# Patient Record
Sex: FEMALE | Race: WHITE | Employment: FULL TIME | URBAN - METROPOLITAN AREA
[De-identification: names, ages, dates, MRNs, and addresses within clinical notes are randomized per-mention and may not be internally consistent; named-entity substitution may affect disease eponyms.]

---

## 2017-05-14 DIAGNOSIS — M72.2 PLANTAR FASCIITIS, RIGHT: ICD-10-CM

## 2017-05-15 NOTE — TELEPHONE ENCOUNTER
Meloxicam      Last Written Prescription Date: 12/26/16  Last Quantity: 30, # refills: 3  Last Office Visit with [facility], [facility] or [facility] prescribing provider: 12/26/16       Creatinine   Date Value Ref Range Status   12/25/2014 0.79 0.52 - 1.04 mg/dL Final     Lab Results   Component Value Date    AST 40 02/12/2014     Lab Results   Component Value Date    ALT 48 02/12/2014     BP Readings from Last 3 Encounters:   12/26/16 121/76   08/15/16 116/68   12/18/15 116/70

## 2017-05-16 RX ORDER — MELOXICAM 15 MG/1
TABLET ORAL
Qty: 30 TABLET | Refills: 1 | Status: SHIPPED | OUTPATIENT
Start: 2017-05-16 | End: 2017-07-13

## 2017-07-06 ENCOUNTER — OFFICE VISIT (OUTPATIENT)
Dept: PODIATRY | Facility: CLINIC | Age: 42
End: 2017-07-06
Payer: COMMERCIAL

## 2017-07-06 VITALS
BODY MASS INDEX: 30.22 KG/M2 | HEART RATE: 77 BPM | HEIGHT: 66 IN | WEIGHT: 188 LBS | DIASTOLIC BLOOD PRESSURE: 87 MMHG | RESPIRATION RATE: 16 BRPM | SYSTOLIC BLOOD PRESSURE: 135 MMHG

## 2017-07-06 DIAGNOSIS — M72.2 PLANTAR FASCIITIS, BILATERAL: ICD-10-CM

## 2017-07-06 DIAGNOSIS — M25.373 ANKLE INSTABILITY, UNSPECIFIED LATERALITY: Primary | ICD-10-CM

## 2017-07-06 PROCEDURE — 99213 OFFICE O/P EST LOW 20 MIN: CPT | Performed by: PODIATRIST

## 2017-07-06 NOTE — MR AVS SNAPSHOT
After Visit Summary   7/6/2017    Alia Jauregui    MRN: [id number]           Patient Information     Date Of Birth          1975        Visit Information        Provider Department      7/6/2017 4:40 PM Yash Batres DPM [facility]        Today's Diagnoses     Ankle instability, unspecified laterality    -  1    Plantar fasciitis, bilateral          Care Instructions    Initial musculoskeletal treatment recommendation:    1.  Stretch the calf muscles as instructed once an hour.  2.  Ice the injured area in the evening; 20 min on/off.  3.  Take antiinflammatory medication as directed.  4.  Massage the soft tissues around the injured area in the morning to loosen the tissue  5.  Wear supportive foot wear    If no improvement in symptoms within four to six weeks, return to clinic for reevaluation.              Follow-ups after your visit        Additional Services     PHYSICAL THERAPY REFERRAL       *This order will print in the [facility] Central Scheduling Office*    [facility] provides Physical Therapy evaluation and treatment and many specialty services across the [facility] system.  If requesting a specialty program, please choose from the list below.    Call one number to schedule at any [facility] location   [phone].    Treatment: Evaluation & Treatment  Special Instructions/Modalities: deep tissue fascial release  Special Programs: None    Please be aware that coverage of these services is subject to the terms and limitations of your health insurance plan.  Call member services at your health plan with any benefit or coverage questions.      **Note to Provider** To refer patients to therapy outside of the location list, change the order class to External Referral in the order composer.                  Follow-up notes from your care team     Return in about 4 weeks (around  "8/3/2017), or if symptoms worsen or fail to improve.      Who to contact     If you have questions or need follow up information about today's clinic visit or your schedule please contact [facility] directly at [phone].  Normal or non-critical lab and imaging results will be communicated to you by [facility], letter or phone within 4 business days after the clinic has received the results. If you do not hear from us within 7 days, please contact the clinic through [facility] or phone. If you have a critical or abnormal lab result, we will notify you by phone as soon as possible.  Submit refill requests through [facility] or call your pharmacy and they will forward the refill request to us. Please allow 3 business days for your refill to be completed.          Additional Information About Your Visit        [facility] Information     [facility] gives you secure access to your electronic health record. If you see a primary care provider, you can also send messages to your care team and make appointments. If you have questions, please call your primary care clinic.  If you do not have a primary care provider, please call [phone] and they will assist you.        Care EveryWhere ID     This is your Care EveryWhere ID. This could be used by other organizations to access your [place] medical records  [id number]        Your Vitals Were     Pulse Respirations Height BMI (Body Mass Index)          77 16 1.664 m (5' 5.5\") 30.81 kg/m2         Blood Pressure from Last 3 Encounters:   07/06/17 135/87   12/26/16 121/76   08/15/16 116/68    Weight from Last 3 Encounters:   07/06/17 85.3 kg (188 lb)   12/26/16 84.7 kg (186 lb 12.8 oz)   08/17/16 84.4 kg (186 lb)              We Performed the Following     PHYSICAL THERAPY REFERRAL          Today's Medication Changes          These changes are accurate as of: 7/6/17  5:08 PM.  If you have any questions, ask your nurse or doctor.               Start " taking these medicines.        Dose/Directions    order for DME   Used for:  Ankle instability, unspecified laterality   Started by:  Yash Batres DPM        Trilok Ankle Brace   Quantity:  2 Device   Refills:  0            Where to get your medicines      Some of these will need a paper prescription and others can be bought over the counter.  Ask your nurse if you have questions.     Bring a paper prescription for each of these medications     order for DME                Primary Care Provider Office Phone # Fax #    Anna Rajan [phone]       [facility] [address] [facility] [address]        Equal Access to Services     [facility]: Hadii aad ku hadasho Soomaali, waaxda luqadaha, qaybta kaalmada adeegyada, petey copeland . So [facility] [phone].    ATENCIÓN: Si habla español, tiene a wilson disposición servicios gratuitos de asistencia lingüística. [facility] [phone].    We comply with applicable federal civil rights laws and [place] laws. We do not discriminate on the basis of race, color, national origin, age, disability sex, sexual orientation or gender identity.            Thank you!     Thank you for choosing [facility]  for your care. Our goal is always to provide you with excellent care. Hearing back from our patients is one way we can continue to improve our services. Please take a few minutes to complete the written survey that you may receive in the mail after your visit with us. Thank you!             Your Updated Medication List - Protect others around you: Learn how to safely use, store and throw away your medicines at www.disposemymeds.org.          This list is accurate as of: 7/6/17  5:08 PM.  Always use your most recent med list.                   Brand Name Dispense Instructions for use Diagnosis    atenolol 50 MG tablet    TENORMIN    90 tablet    Take 1 tablet (50 mg) by mouth daily     Hypertension goal BP (blood pressure) < 140/90       ibuprofen 200 MG tablet    ADVIL/MOTRIN     Take 200 mg by mouth every 4 hours as needed.        loratadine 10 MG tablet    CLARITIN     Take 10 mg by mouth daily        meloxicam 15 MG tablet    MOBIC    30 tablet    TAKE ONE TABLET (15 MG) BY MOUTH ONCE DAILY AS NEEDED    Plantar fasciitis, right       order for DME      Respironics Remstar 60 series auto 5-15 cm H2O, wisp s/m    TONY (obstructive sleep apnea)       order for DME     2 Device    Trilok Ankle Brace    Ankle instability, unspecified laterality       TYLENOL 325 MG tablet   Generic drug:  acetaminophen      Take 1-2 tablets by mouth every 6 hours as needed.

## 2017-07-06 NOTE — PROGRESS NOTES
Alia returns to the office for reevaluation of the right and left foot.  The patient relates following the instructions given at the last visit with noted more pain.  The patient relates overall less  improvement in pain and function of the right and left foot.  The patient relates no other problems.    PAST MEDICAL HISTORY:   Past Medical History:   Diagnosis Date     Gestational diabetes 2008     Incompetence of cervix 7/25/2008 7/22/08-cerclage placed, Dr. Quezada following pt with mfm      Preeclampsia        BMI= Body mass index is 30.81 kg/(m^2).    Weight management plan: Patient was referred to their PCP to discuss a diet and exercise plan.    Physical Exam:    General: The patient appears to have a pleasant mental affect.    Lower extremity physical exam:  Neurovascular status is intact with palpable pedal pulses and intact epicritic sensations.  Muscular exam is within normal limits to major muscle groups.  Integument is intact.      One notes decreased edema.  One notes instability of the ankle joints bilaterally with excessive inversion noted with pain.  Noted pain on palpation of the insertion point of the plantar fascia bilaterally.  No surrounding erythema noted.       Assessment:      ICD-10-CM    1. Ankle instability, unspecified laterality M25.373 order for DME   2. Plantar fasciitis, bilateral M72.2 PHYSICAL THERAPY REFERRAL       Plan:  I have explained to Alia about the conditions.  At this time, the patient was instructed on icing, stretching, tissue massage and support.  The patient was fitted with a Trilok ankle brace that will aid in offloading the tension forces to the soft tissues and prevent further inflammation.  To reduce the amount of current instability and inflammation, the patient was prescribed physical therapy.  The patient will return in four weeks for reevaluation if the symptoms do not resolve.        Disclaimer: This note consists of symbols derived from keyboarding,  dictation and/or voice recognition software. As a result, there may be errors in the script that have gone undetected. Please consider this when interpreting information found in this chart.       SHERI Batres D.P.M., JOHN.[email or web address].

## 2017-07-06 NOTE — LETTER
7/6/2017         RE: Alia Jauregui  [address] [place] [address]        Dear Colleague,    Thank you for referring your patient, Alia Jauregui, to the [facility]. Please see a copy of my visit note below.    Alia returns to the office for reevaluation of the right and left foot.  The patient relates following the instructions given at the last visit with noted more pain.  The patient relates overall less  improvement in pain and function of the right and left foot.  The patient relates no other problems.    PAST MEDICAL HISTORY:   Past Medical History:   Diagnosis Date     Gestational diabetes 2008     Incompetence of cervix 7/25/2008 7/22/08-cerclage placed, Dr. Quezada following pt with mfm      Preeclampsia        BMI= Body mass index is 30.81 kg/(m^2).    Weight management plan: Patient was referred to their PCP to discuss a diet and exercise plan.    Physical Exam:    General: The patient appears to have a pleasant mental affect.    Lower extremity physical exam:  Neurovascular status is intact with palpable pedal pulses and intact epicritic sensations.  Muscular exam is within normal limits to major muscle groups.  Integument is intact.      One notes decreased edema.  One notes instability of the ankle joints bilaterally with excessive inversion noted with pain.  Noted pain on palpation of the insertion point of the plantar fascia bilaterally.  No surrounding erythema noted.       Assessment:      ICD-10-CM    1. Ankle instability, unspecified laterality M25.373 order for DME   2. Plantar fasciitis, bilateral M72.2 PHYSICAL THERAPY REFERRAL       Plan:  I have explained to Alia about the conditions.  At this time, the patient was instructed on icing, stretching, tissue massage and support.  The patient was fitted with a Trilok ankle brace that will aid in offloading the tension forces to the soft tissues and prevent further inflammation.  To reduce the  amount of current instability and inflammation, the patient was prescribed physical therapy.  The patient will return in four weeks for reevaluation if the symptoms do not resolve.        Disclaimer: This note consists of symbols derived from keyboarding, dictation and/or voice recognition software. As a result, there may be errors in the script that have gone undetected. Please consider this when interpreting information found in this chart.       SHERI Batres D.P.M., F.A.C.F.A.S.      Again, thank you for allowing me to participate in the care of your patient.        Sincerely,        Yash Batres DPM

## 2017-07-06 NOTE — NURSING NOTE
"Chief Complaint   Patient presents with     RECHECK     Plantar Fasciitis        Initial /87 (BP Location: Left arm, Patient Position: Chair, Cuff Size: Adult Regular)  Pulse 77  Resp 16  Ht 5' 5.5\" (1.664 m)  Wt 188 lb (85.3 kg)  BMI 30.81 kg/m2 Estimated body mass index is 30.81 kg/(m^2) as calculated from the following:    Height as of this encounter: 5' 5.5\" (1.664 m).    Weight as of this encounter: 188 lb (85.3 kg).  BP completed using cuff size: regular      Jayde Torres CMA     "

## 2017-07-13 DIAGNOSIS — M72.2 PLANTAR FASCIITIS, RIGHT: ICD-10-CM

## 2017-07-13 NOTE — TELEPHONE ENCOUNTER
Meloxicam      Last Written Prescription Date: 05/16/17  Last Quantity: 30, # refills: 1  [facility] Office Visit with [facility] or [facility] prescribing provider: 12/16/16       Creatinine   Date Value Ref Range Status   12/25/2014 0.79 0.52 - 1.04 mg/dL Final     Lab Results   Component Value Date    AST 40 02/12/2014     Lab Results   Component Value Date    ALT 48 02/12/2014     BP Readings from Last 3 Encounters:   07/06/17 135/87   12/26/16 121/76   08/15/16 116/68

## 2017-07-17 ENCOUNTER — HOSPITAL ENCOUNTER (OUTPATIENT)
Dept: PHYSICAL THERAPY | Facility: CLINIC | Age: 42
Setting detail: THERAPIES SERIES
End: 2017-07-17
Attending: PODIATRIST
Payer: COMMERCIAL

## 2017-07-17 PROCEDURE — 97110 THERAPEUTIC EXERCISES: CPT | Mod: GP | Performed by: PHYSICAL THERAPIST

## 2017-07-17 PROCEDURE — 97140 MANUAL THERAPY 1/> REGIONS: CPT | Mod: GP | Performed by: PHYSICAL THERAPIST

## 2017-07-17 PROCEDURE — 97161 PT EVAL LOW COMPLEX 20 MIN: CPT | Mod: GP | Performed by: PHYSICAL THERAPIST

## 2017-07-17 PROCEDURE — 40000718 ZZHC STATISTIC PT DEPARTMENT ORTHO VISIT: Performed by: PHYSICAL THERAPIST

## 2017-07-18 NOTE — TELEPHONE ENCOUNTER
Patient due for labs  Jeimy refill x1 given 5/16/17    Left detailed message for patient to return call to clinic.    Shanika FAGAN Rn

## 2017-07-18 NOTE — PROGRESS NOTES
PHYSICAL THERAPY INITIAL EVALUATION  07/17/17 1600   General Information   Type of Visit Initial OP Ortho PT Evaluation   Start of Care Date 07/17/17   Referring Physician Dr. Batres   Patient/Family Goals Statement get rid of the pain and off the medication   Orders Evaluate and Treat   Orders Comment deep tissue fascial release   Date of Order 07/06/17   Insurance Type [employer]   Insurance Comments/Visits Authorized no limits   Medical Diagnosis Plantar fasciitis, bilateral    Surgical/Medical history reviewed Yes   Precautions/Limitations no known precautions/limitations   General Information Comments PMH: severely sprained L ankle? in highschool   Body Part(s)   Body Part(s) Ankle/Foot   Presentation and Etiology   Pertinent history of current problem (include personal factors and/or comorbidities that impact the POC) Patient reports pain in heels for over a year, saw Dr. Batres about a year ago. Was put on melexocam, primary doctor increased which then helped. As long as she was on them it helped with the pain but as soon as she stops taking it, it hurts. Pain with prolonged walking and first thing in the morning. Patient reports had orthotics made about 6 months ago. Recently got 2 trilock braces. Has been doing calf stretches several times day, every day.      Impairments A. Pain;C. Swelling;E. Decreased flexibility;H. Impaired gait   Functional Limitations perform activities of daily living;perform required work activities;perform desired leisure / sports activities   Symptom Location B heels, anterior ankle with prolonged walking   How/Where did it occur From insidious onset   Onset date of current episode/exacerbation 07/17/16   Chronicity Chronic   Pain rating (0-10 point scale) Best (/10);Worst (/10)   Best (/10) 3   Worst (/10) 9   Pain quality A. Sharp;B. Dull;C. Aching;F. Stabbing   Frequency of pain/symptoms A. Constant   Pain/symptoms exacerbated by B. Walking;E. Rest   Pain/symptoms eased by  J. Braces/supports;I. OTC medication(s)   Current Level of Function   Patient role/employment history A. 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      Fall Risk Screen   Fall screen completed by PT   Per patient - Fall 2 or more times in past year? No   Per patient - Fall with injury in past year? No   Is patient a fall risk? No   Functional Scales   Functional Scales Other   Other Scales  LEFS: 59/80   Ankle/Foot Objective Findings   Side (if bilateral, select both right and left) Right;Left   Gait/Locomotion hyperpronation R foot   Foot Position In Standing pes planus R   Kleiger ER Test (DF/Eversion Test) -   Anterior Drawer Test slightly increased laxity L ankle no pain   Posterior Drawer Test -   Talar Tilt Test -   Palpation tender medial calcaneal tubercle at PF origin   Accessory Motion/Joint Mobility WNL   Right DF (Knee Ext) AROM 5   Right PF AROM WNL   Right Calcanceal Inversion AROM 55   Right Calcaneal Eversion AROM 28   Right Gastroc (in WB) Flexibility mod tight   Right Soleus (in WB) Flexibility mild tight   Left DF (Knee Ext) AROM 5   Left PF AROM WNL   Left Calcanceal Inversion AROM 50   Left Calcaneal Eversion AROM 25   Left Gastroc (in WB) Flexibility mod tight   Left Soleus (in WB) Flexibility mild tight   Planned Therapy Interventions   Planned Therapy Interventions balance training;joint mobilization;manual therapy;neuromuscular re-education;strengthening;stretching;ROM   Clinical Impression   Criteria for Skilled Therapeutic Interventions Met yes, treatment indicated   PT Diagnosis B heel pain   Influenced by the following impairments pain, decreased flexibility, decreased ROM   Functional limitations due to impairments walking, first steps in the morning   Clinical Presentation Stable/Uncomplicated   Clinical Presentation Rationale minimally provactive exam    Clinical Decision Making (Complexity) Low  complexity   Therapy Frequency 1 time/week   Predicted Duration of Therapy Intervention (days/wks) 6-8 weeks   Risk & Benefits of therapy have been explained Yes   Patient, Family & other staff in agreement with plan of care Yes   Clinical Impression Comments Patient presenting with signs and symptoms consistent with B plantar fasciitis.    Education Assessment   Preferred Learning Style Listening;Demonstration;Pictures/video   Barriers to Learning No barriers   ORTHO GOALS   PT Ortho Eval Goals 1;2;3   Ortho Goal 1   Goal Identifier 1   Goal Description Patient will be able to walk a mile with minimal pain or difficulty.    Target Date 09/12/17   Ortho Goal 2   Goal Identifier 2   Goal Description Patient will report pain with first steps in morning 2/10 or less.   Target Date 09/12/17   Ortho Goal 3   Goal Identifier 3   Goal Description Patient will be independent and compliant with HEP to aid functional recovery.    Target Date 09/12/17   Total Evaluation Time   Total Evaluation Time 15       Please contact me with any questions or concerns.  Thank you for your referral.    Annette Altamirano, PT, DPT, OCS  Physical Therapist, Orthopedic Certified Specialist  [facility]  [phone]

## 2017-07-19 RX ORDER — MELOXICAM 15 MG/1
TABLET ORAL
Qty: 15 TABLET | Refills: 0 | Status: SHIPPED | OUTPATIENT
Start: 2017-07-19 | End: 2017-07-19

## 2017-07-19 NOTE — TELEPHONE ENCOUNTER
Left message for the patient to call the clinic.  Spoke to the pharmacy to let them no of the need for labs for further refills. Grisel VERDUGO RN

## 2017-07-26 ENCOUNTER — HOSPITAL ENCOUNTER (OUTPATIENT)
Dept: PHYSICAL THERAPY | Facility: CLINIC | Age: 42
Setting detail: THERAPIES SERIES
End: 2017-07-26
Attending: PODIATRIST
Payer: COMMERCIAL

## 2017-07-26 PROCEDURE — 97140 MANUAL THERAPY 1/> REGIONS: CPT | Mod: GP | Performed by: PHYSICAL THERAPIST

## 2017-07-26 PROCEDURE — 97110 THERAPEUTIC EXERCISES: CPT | Mod: GP | Performed by: PHYSICAL THERAPIST

## 2017-07-26 PROCEDURE — 40000718 ZZHC STATISTIC PT DEPARTMENT ORTHO VISIT: Performed by: PHYSICAL THERAPIST

## 2017-08-02 ENCOUNTER — HOSPITAL ENCOUNTER (OUTPATIENT)
Dept: PHYSICAL THERAPY | Facility: CLINIC | Age: 42
Setting detail: THERAPIES SERIES
End: 2017-08-02
Attending: PODIATRIST
Payer: COMMERCIAL

## 2017-08-02 PROCEDURE — 97140 MANUAL THERAPY 1/> REGIONS: CPT | Mod: GP | Performed by: PHYSICAL THERAPIST

## 2017-08-02 PROCEDURE — 40000718 ZZHC STATISTIC PT DEPARTMENT ORTHO VISIT: Performed by: PHYSICAL THERAPIST

## 2017-08-10 ENCOUNTER — HOSPITAL ENCOUNTER (OUTPATIENT)
Dept: PHYSICAL THERAPY | Facility: CLINIC | Age: 42
Setting detail: THERAPIES SERIES
End: 2017-08-10
Attending: PODIATRIST
Payer: COMMERCIAL

## 2017-08-10 PROCEDURE — 40000718 ZZHC STATISTIC PT DEPARTMENT ORTHO VISIT: Performed by: PHYSICAL THERAPIST

## 2017-08-10 PROCEDURE — 97140 MANUAL THERAPY 1/> REGIONS: CPT | Mod: GP | Performed by: PHYSICAL THERAPIST

## 2017-08-16 ENCOUNTER — HOSPITAL ENCOUNTER (OUTPATIENT)
Dept: PHYSICAL THERAPY | Facility: CLINIC | Age: 42
Setting detail: THERAPIES SERIES
End: 2017-08-16
Attending: PODIATRIST
Payer: COMMERCIAL

## 2017-08-16 PROCEDURE — 40000718 ZZHC STATISTIC PT DEPARTMENT ORTHO VISIT: Performed by: PHYSICAL THERAPIST

## 2017-08-16 PROCEDURE — 97140 MANUAL THERAPY 1/> REGIONS: CPT | Mod: GP | Performed by: PHYSICAL THERAPIST

## 2017-08-16 PROCEDURE — 97110 THERAPEUTIC EXERCISES: CPT | Mod: GP | Performed by: PHYSICAL THERAPIST

## 2017-12-26 NOTE — PROGRESS NOTES
PHYSICAL THERAPY DISCHARGE NOTE  08/16/17 1600   Signing Clinician's Name / Credentials   Signing clinician's name / credentials Annette Altamirano, PT, DPT, OCS   Session Number   Session Number 5 HP   Progress Note/Recertification   Progress Note Due Date 09/12/17   Adult Goals   PT Ortho Eval Goals 1;2;3   Ortho Goal 1   Goal Identifier 1   Goal Description Patient will be able to walk a mile with minimal pain or difficulty.    Target Date 09/12/17   Date Met 08/16/17   Ortho Goal 2   Goal Identifier 2   Goal Description Patient will report pain with first steps in morning 2/10 or less.   Target Date 09/12/17   Date Met 08/16/17   Ortho Goal 3   Goal Identifier 3   Goal Description Patient will be independent and compliant with HEP to aid functional recovery.    Target Date 09/12/17   Subjective Report   Subjective Report Patient reports that the past week the feet have been doing better, less pain  in the heel. Mostly feels pain in anterior ankle while walking. Patient reports that she hasn't taken any medication for past 10 days.     Treatment Interventions   Interventions Therapeutic Procedure/Exercise;Manual Therapy   Therapeutic Procedure/exercise   Minutes 8   Skilled Intervention HEP instruction, flexibility to decrease pain and improve function   Patient Response no pain, demonstrated correct form   Treatment Detail instr pt in performing SL clamshell GTB x 20 B, continue gastroc and soleus stretchin, PF stretching, PF rolling.    Manual Therapy   Minutes 17   Skilled Intervention tooling to decrease tissue tension and pain and improve gait   Patient Response tolerated well, improved flexibility   Treatment Detail tool assisted STM to B plantar fascia, STM to anterior tibiliais B,  STM to B gastroc-soleus complex   Education   Learner Patient   Readiness Eager   Method Booklet/handout;Explanation;Demonstration   Response Verbalizes Understanding;Demonstrates Understanding   Plan   Home program above ex    Updates to plan of care pt would like to try continuing ex on her own and follow up if she needs to   Plan for next session hold chart 30 days   Total Session Time   Timed Code Treatment Minutes 25   Total Treatment Time (sum of timed and untimed services) 25, te mt     UPDATE 12/26/17: Patient did not need to return to physical therapy. Discharge from skilled PT services.        Please contact me with any questions or concerns.  Thank you for your referral.    Annette Altamirano, PT, DPT, OCS  Physical Therapist, Orthopedic Certified Specialist  [facility]  [phone]

## 2017-12-26 NOTE — ADDENDUM NOTE
Encounter addended by: Annette Altamirano, PT on: 12/26/2017  4:11 PM<BR>     Actions taken: Sign clinical note, Flowsheet accepted, Episode resolved

## 2018-01-04 ENCOUNTER — OFFICE VISIT (OUTPATIENT)
Dept: FAMILY MEDICINE | Facility: CLINIC | Age: 43
End: 2018-01-04
Payer: COMMERCIAL

## 2018-01-04 VITALS
HEART RATE: 92 BPM | RESPIRATION RATE: 18 BRPM | BODY MASS INDEX: 28.48 KG/M2 | SYSTOLIC BLOOD PRESSURE: 133 MMHG | TEMPERATURE: 98.8 F | DIASTOLIC BLOOD PRESSURE: 88 MMHG | WEIGHT: 177.2 LBS | OXYGEN SATURATION: 96 % | HEIGHT: 66 IN

## 2018-01-04 DIAGNOSIS — J02.0 ACUTE STREPTOCOCCAL PHARYNGITIS: Primary | ICD-10-CM

## 2018-01-04 DIAGNOSIS — R07.0 THROAT PAIN: ICD-10-CM

## 2018-01-04 LAB
DEPRECATED S PYO AG THROAT QL EIA: ABNORMAL
SPECIMEN SOURCE: ABNORMAL

## 2018-01-04 PROCEDURE — 87880 STREP A ASSAY W/OPTIC: CPT | Performed by: FAMILY MEDICINE

## 2018-01-04 PROCEDURE — 99213 OFFICE O/P EST LOW 20 MIN: CPT | Performed by: FAMILY MEDICINE

## 2018-01-04 RX ORDER — PENICILLIN V POTASSIUM 500 MG/1
500 TABLET, FILM COATED ORAL 2 TIMES DAILY
Qty: 20 TABLET | Refills: 0 | Status: SHIPPED | OUTPATIENT
Start: 2018-01-04 | End: 2018-01-14

## 2018-01-04 NOTE — MR AVS SNAPSHOT
After Visit Summary   1/4/2018    Alia Jauregui    MRN: [id number]           Patient Information     Date Of Birth          1975        Visit Information        Provider Department      1/4/2018 8:00 AM Salvador Huertas MD [facility]        Today's Diagnoses     Acute streptococcal pharyngitis    -  1    Throat pain          Care Instructions          Thank you for choosing [facility].  You may be receiving a survey in the mail from [facility] regarding your visit today.  Please take a few minutes to complete and return the survey to let us know how we are doing.      If you have questions or concerns, please contact us via MeeDoc or you can contact your care team at [phone].    Our Clinic hours are:  Monday 6:40 am  to 7:00 pm  Tuesday -Friday 6:40 am to 5:00 pm    The [place] outpatient lab hours are:  Monday - Friday 6:10 am to 4:45 pm  Saturdays 7:00 am to 11:00 am  Appointments are required, call [phone]    If you have clinical questions after hours or would like to schedule an appointment,  call the clinic at [phone].  Pharyngitis: Strep (Confirmed)    You have had a positive test for strep throat. Strep throat is a contagious illness. It is spread by coughing, kissing or by touching others after touching your mouth or nose. Symptoms include throat pain that is worse with swallowing, aching all over, headache, and fever. It is treated with antibiotic medicine. This should help you start to feel better in 1 to 2 days.  Home care    Rest at home. Drink plenty of fluids to you won't get dehydrated.    No work or school for the first 2 days of taking the antibiotics. After this time, you will not be contagious. You can then return to school or work if you are feeling better.     Take antibiotic medicine for the full 10 days, even if you feel better. This is very important to ensure the infection is treated. It is also important to prevent  medicine-resistant germs from developing. If you were given an antibiotic shot, you don't need any more antibiotics.    You may use acetaminophen or ibuprofen to control pain or fever, unless another medicine was prescribed for this. Talk with your doctor before taking these medicines if you have chronic liver or kidney disease. Also talk with your doctor if you have had a stomach ulcer or GI bleeding.    Throat lozenges or sprays help reduce pain. Gargling with warm saltwater will also reduce throat pain. Dissolve 1/2 teaspoon of salt in 1 glass of warm water. This may be useful just before meals.     Soft foods are OK. Avoid salty or spicy foods.  Follow-up care  Follow up with your healthcare provider or our staff if you don't get better over the next week.  When to seek medical advice  Call your healthcare provider right away if any of these occur:    Fever of 100.4 F (38 C) or higher, or as directed by your healthcare provider    New or worsening ear pain, sinus pain, or headache    Painful lumps in the back of neck    Stiff neck    Lymph nodes getting larger or becoming soft in the middle    You can't swallow liquids or you can't open your mouth wide because of throat pain    Signs of dehydration. These include very dark urine or no urine, sunken eyes, and dizziness.    Trouble breathing or noisy breathing    Muffled voice    Rash  Date Last Reviewed: 4/13/2015 2000-2017 [employer]. [address] [place] [address]. All rights reserved. This information is not intended as a substitute for professional medical care. Always follow your healthcare professional's instructions.                Follow-ups after your visit        Your next 10 appointments already scheduled     Jan 05, 2018  7:40 AM CST   MyChart Short with Tonya Lane MD   [facility]    [address] [address]   [phone]              Who to  "contact     If you have questions or need follow up information about today's clinic visit or your schedule please contact [facility] directly at [phone].  Normal or non-critical lab and imaging results will be communicated to you by MyChart, letter or phone within 4 business days after the clinic has received the results. If you do not hear from us within 7 days, please contact the clinic through [employer] or phone. If you have a critical or abnormal lab result, we will notify you by phone as soon as possible.  Submit refill requests through [employer] or call your pharmacy and they will forward the refill request to us. Please allow 3 business days for your refill to be completed.          Additional Information About Your Visit        [employer] Information     [employer] gives you secure access to your electronic health record. If you see a primary care provider, you can also send messages to your care team and make appointments. If you have questions, please call your primary care clinic.  If you do not have a primary care provider, please call [phone] and they will assist you.        Care EveryWhere ID     This is your Care EveryWhere ID. This could be used by other organizations to access your [employer] [facility] [employer] medical records  [id number]        Your Vitals Were     Pulse Temperature Respirations Height Last Period Pulse Oximetry    92 98.8  F (37.1  C) (Tympanic) 18 5' 5.5\" (1.664 m) 12/10/2014 96%    BMI (Body Mass Index)                   29.04 kg/m2            Blood Pressure from Last 3 Encounters:   01/04/18 133/88   07/06/17 135/87   12/26/16 121/76    Weight from Last 3 Encounters:   01/04/18 177 lb 3.2 oz (80.4 kg)   07/06/17 188 lb (85.3 kg)   12/26/16 186 lb 12.8 oz (84.7 kg)              We Performed the Following     Strep, Rapid Screen          Today's Medication Changes          These changes are accurate as of: 1/4/18  8:25 AM.  If you have any questions, ask your nurse or doctor.       "         Start taking these medicines.        Dose/Directions    penicillin V potassium 500 MG tablet   Commonly known as:  VEETID   Used for:  Acute streptococcal pharyngitis   Started by:  Salvador Huertas MD        Dose:  500 mg   Take 1 tablet (500 mg) by mouth 2 times daily for 10 days   Quantity:  20 tablet   Refills:  0            Where to get your medicines      These medications were sent to [facility] [place] - [place], [place] - [address]  [address] [address]     Phone:  [phone]     penicillin V potassium 500 MG tablet                Primary Care Provider Office Phone # Fax #    Anna Rajan [phone] [address] [place] [address]        Equal Access to Services     FELIPE WU : Hadii ruchi bunch hadasho Soomaali, waaxda luqadaha, qaybta kaalmada adeegyada, petey copeland . So [facility] [phone].    ATENCIÓN: Si habla español, tiene a wilson disposición servicios gratuitos de asistencia lingüística. [facility] [phone].    We comply with applicable federal civil rights laws and [place] laws. We do not discriminate on the basis of race, color, national origin, age, disability, sex, sexual orientation, or gender identity.            Thank you!     Thank you for choosing [facility]  for your care. Our goal is always to provide you with excellent care. Hearing back from our patients is one way we can continue to improve our services. Please take a few minutes to complete the written survey that you may receive in the mail after your visit with us. Thank you!             Your Updated Medication List - Protect others around you: Learn how to safely use, store and throw away your medicines at www.disposemymeds.org.          This list is accurate as of: 1/4/18  8:25 AM.  Always use your most recent med list.                   Brand Name Dispense Instructions for use Diagnosis    atenolol 50 MG tablet     TENORMIN    90 tablet    Take 1 tablet (50 mg) by mouth daily    Hypertension goal BP (blood pressure) < 140/90       ibuprofen 200 MG tablet    ADVIL/MOTRIN     Take 200 mg by mouth every 4 hours as needed.        loratadine 10 MG tablet    CLARITIN     Take 10 mg by mouth as needed        order for DME      Respironics Remstar 60 series auto 5-15 cm H2O, wisp s/m    TONY (obstructive sleep apnea)       order for DME     2 Device    Trilok Ankle Brace    Ankle instability, unspecified laterality       penicillin V potassium 500 MG tablet    VEETID    20 tablet    Take 1 tablet (500 mg) by mouth 2 times daily for 10 days    Acute streptococcal pharyngitis       TYLENOL 325 MG tablet   Generic drug:  acetaminophen      Take 1-2 tablets by mouth every 6 hours as needed.

## 2018-01-04 NOTE — PROGRESS NOTES
"  SUBJECTIVE:   Alia Jauregui is a 42 year old female who presents to clinic today for the following health issues:      ENT Symptoms             Symptoms: cc Present Absent Comment   Fever/Chills  x     Fatigue  x     Muscle Aches  x     Eye Irritation   x    Sneezing   x    Nasal Teja/Drg  x     Sinus Pressure/Pain   x    Loss of smell   x    Dental pain   x    Sore Throat  x     Swollen Glands  x     Ear Pain/Fullness  x  pressure   Cough   x    Wheeze   x    Chest Pain   x    Shortness of breath   x    Rash   x    Other  x  headache     Symptom duration: 3 days   Symptom severity:  moderate/severe   Treatments tried:  tylenol, ibuprofen, cough drops, mucinex   Contacts:  work       Monday night started with sore throat and fever and chills and aches.    Problem list and histories reviewed & adjusted, as indicated.  Additional history: as documented    Reviewed and updated as needed this visit by clinical staffTobacco  Allergies  Meds  Med Hx  Surg Hx  Fam Hx  Soc Hx      Reviewed and updated as needed this visit by Provider         ROS:  Constitutional, HEENT, cardiovascular, pulmonary, gi and gu systems are negative, except as otherwise noted.      OBJECTIVE:   /88  Pulse 92  Temp 98.8  F (37.1  C) (Tympanic)  Resp 18  Ht 5' 5.5\" (1.664 m)  Wt 177 lb 3.2 oz (80.4 kg)  LMP 12/10/2014  SpO2 96%  BMI 29.04 kg/m2  Body mass index is 29.04 kg/(m^2).  GENERAL: healthy, alert and no distress  HENT: ear canals and TM's normal, nose and mouth without ulcers or lesions, posterior pharynx moderate erythema tonsils 2+.  NECK: no adenopathy, no asymmetry, masses, or scars and thyroid normal to palpation  RESP: lungs clear to auscultation - no rales, rhonchi or wheezes  CV: regular rate and rhythm, normal S1 S2, no S3 or S4, no murmur, click or rub, no peripheral edema and peripheral pulses strong  SKIN: no suspicious lesions or rashes    Diagnostic Test Results:  Results for orders placed or " performed in visit on 01/04/18 (from the past 24 hour(s))   Strep, Rapid Screen   Result Value Ref Range    Specimen Description Throat     Rapid Strep A Screen (A)      POSITIVE: Group A Streptococcal antigen detected by immunoassay.       ASSESSMENT/PLAN:       Alia was seen today for pharyngitis.    Diagnoses and all orders for this visit:    Acute streptococcal pharyngitis  -     penicillin V potassium (VEETID) 500 MG tablet; Take 1 tablet (500 mg) by mouth 2 times daily for 10 days    Throat pain  -     Strep, Rapid Screen        See Patient Instructions    Salvador Huertas MD  [facility]

## 2018-01-04 NOTE — PATIENT INSTRUCTIONS
Thank you for choosing [facility].  You may be receiving a survey in the mail from [employer] regarding your visit today.  Please take a few minutes to complete and return the survey to let us know how we are doing.      If you have questions or concerns, please contact us via [employer] or you can contact your care team at [phone].    Our Clinic hours are:  Monday 6:40 am  to 7:00 pm  Tuesday -Friday 6:40 am to 5:00 pm    The [place] outpatient lab hours are:  Monday - Friday 6:10 am to 4:45 pm  Saturdays 7:00 am to 11:00 am  Appointments are required, call [phone]    If you have clinical questions after hours or would like to schedule an appointment,  call the clinic at [phone].  Pharyngitis: Strep (Confirmed)    You have had a positive test for strep throat. Strep throat is a contagious illness. It is spread by coughing, kissing or by touching others after touching your mouth or nose. Symptoms include throat pain that is worse with swallowing, aching all over, headache, and fever. It is treated with antibiotic medicine. This should help you start to feel better in 1 to 2 days.  Home care    Rest at home. Drink plenty of fluids to you won't get dehydrated.    No work or school for the first 2 days of taking the antibiotics. After this time, you will not be contagious. You can then return to school or work if you are feeling better.     Take antibiotic medicine for the full 10 days, even if you feel better. This is very important to ensure the infection is treated. It is also important to prevent medicine-resistant germs from developing. If you were given an antibiotic shot, you don't need any more antibiotics.    You may use acetaminophen or ibuprofen to control pain or fever, unless another medicine was prescribed for this. Talk with your doctor before taking these medicines if you have chronic liver or kidney disease. Also talk with your doctor if you have had a stomach ulcer or GI  bleeding.    Throat lozenges or sprays help reduce pain. Gargling with warm saltwater will also reduce throat pain. Dissolve 1/2 teaspoon of salt in 1 glass of warm water. This may be useful just before meals.     Soft foods are OK. Avoid salty or spicy foods.  Follow-up care  Follow up with your healthcare provider or our staff if you don't get better over the next week.  When to seek medical advice  Call your healthcare provider right away if any of these occur:    Fever of 100.4 F (38 C) or higher, or as directed by your healthcare provider    New or worsening ear pain, sinus pain, or headache    Painful lumps in the back of neck    Stiff neck    Lymph nodes getting larger or becoming soft in the middle    You can't swallow liquids or you can't open your mouth wide because of throat pain    Signs of dehydration. These include very dark urine or no urine, sunken eyes, and dizziness.    Trouble breathing or noisy breathing    Muffled voice    Rash  Date Last Reviewed: 4/13/2015 2000-2017 [employer]. [address] [place] [address], [place] [address]. All rights reserved. This information is not intended as a substitute for professional medical care. Always follow your healthcare professional's instructions.

## 2018-01-04 NOTE — LETTER
[facility]  [address] [address]  Phone: [phone]    January 4, 2018        Alia Jauregui  [address] [facility] [address]          To whom it may concern:    RE: Alia Jauregui    Patient was seen and treated today at our clinic and missed work 1/2-1/5/18.  She may return to work when fever free for 24 hours.    Please contact me for questions or concerns.      Sincerely,        Salvador Huertas MD

## 2018-01-04 NOTE — NURSING NOTE
"Chief Complaint   Patient presents with     Pharyngitis     x 2 days       Initial /88  Pulse 92  Temp 98.8  F (37.1  C) (Tympanic)  Resp 18  Ht 5' 5.5\" (1.664 m)  Wt 177 lb 3.2 oz (80.4 kg)  LMP 12/10/2014  SpO2 96%  BMI 29.04 kg/m2 Estimated body mass index is 29.04 kg/(m^2) as calculated from the following:    Height as of this encounter: 5' 5.5\" (1.664 m).    Weight as of this encounter: 177 lb 3.2 oz (80.4 kg).  Medication Reconciliation: complete  "

## 2018-04-27 DIAGNOSIS — I10 HYPERTENSION GOAL BP (BLOOD PRESSURE) < 140/90: ICD-10-CM

## 2018-04-30 NOTE — TELEPHONE ENCOUNTER
"Requested Prescriptions   Pending Prescriptions Disp Refills     atenolol (TENORMIN) 50 MG tablet [Pharmacy Med Name: ATENOLOL 50MG       TAB]  Last Written Prescription Date:  12/26/2016  Last Fill Quantity: 90,  # refills: 3   Last office visit: 1/4/2018 with prescribing provider:  matt   Future Office Visit:     90 tablet 3     Sig: TAKE ONE TABLET (50 MG) BY MOUTH ONCE DAILY    Beta-Blockers Protocol Passed    4/27/2018  9:22 PM       Passed - Blood pressure under 140/90 in past 12 months    BP Readings from Last 3 Encounters:   01/04/18 133/88   07/06/17 135/87   12/26/16 121/76                Passed - Patient is age 6 or older       Passed - Recent (12 mo) or future (30 days) visit within the authorizing provider's specialty    Patient had office visit in the last 12 months or has a visit in the next 30 days with authorizing provider or within the authorizing provider's specialty.  See \"Patient Info\" tab in inbasket, or \"Choose Columns\" in Meds & Orders section of the refill encounter.            Michael Singh RT (R)    "

## 2018-05-01 RX ORDER — ATENOLOL 50 MG/1
TABLET ORAL
Qty: 90 TABLET | Refills: 2 | Status: SHIPPED | OUTPATIENT
Start: 2018-05-01 | End: 2019-06-05

## 2018-06-20 ENCOUNTER — OFFICE VISIT (OUTPATIENT)
Dept: FAMILY MEDICINE | Facility: CLINIC | Age: 43
End: 2018-06-20
Payer: COMMERCIAL

## 2018-06-20 VITALS
DIASTOLIC BLOOD PRESSURE: 98 MMHG | WEIGHT: 183.8 LBS | TEMPERATURE: 99.3 F | OXYGEN SATURATION: 95 % | BODY MASS INDEX: 29.54 KG/M2 | RESPIRATION RATE: 14 BRPM | HEART RATE: 90 BPM | SYSTOLIC BLOOD PRESSURE: 146 MMHG | HEIGHT: 66 IN

## 2018-06-20 DIAGNOSIS — R10.31 ABDOMINAL PAIN, RIGHT LOWER QUADRANT: Primary | ICD-10-CM

## 2018-06-20 LAB
ALBUMIN SERPL-MCNC: 4 G/DL (ref 3.4–5)
ALP SERPL-CCNC: 63 U/L (ref 40–150)
ALT SERPL W P-5'-P-CCNC: 48 U/L (ref 0–50)
ANION GAP SERPL CALCULATED.3IONS-SCNC: 7 MMOL/L (ref 3–14)
AST SERPL W P-5'-P-CCNC: 32 U/L (ref 0–45)
BASOPHILS # BLD AUTO: 0.1 10E9/L (ref 0–0.2)
BASOPHILS NFR BLD AUTO: 0.9 %
BILIRUB SERPL-MCNC: 1.8 MG/DL (ref 0.2–1.3)
BUN SERPL-MCNC: 10 MG/DL (ref 7–30)
CALCIUM SERPL-MCNC: 9 MG/DL (ref 8.5–10.1)
CHLORIDE SERPL-SCNC: 102 MMOL/L (ref 94–109)
CO2 SERPL-SCNC: 29 MMOL/L (ref 20–32)
CREAT SERPL-MCNC: 0.79 MG/DL (ref 0.52–1.04)
DIFFERENTIAL METHOD BLD: NORMAL
EOSINOPHIL # BLD AUTO: 0.3 10E9/L (ref 0–0.7)
EOSINOPHIL NFR BLD AUTO: 2.8 %
ERYTHROCYTE [DISTWIDTH] IN BLOOD BY AUTOMATED COUNT: 11.9 % (ref 10–15)
GFR SERPL CREATININE-BSD FRML MDRD: 80 ML/MIN/1.7M2
GLUCOSE SERPL-MCNC: 94 MG/DL (ref 70–99)
HCT VFR BLD AUTO: 43 % (ref 35–47)
HGB BLD-MCNC: 15.1 G/DL (ref 11.7–15.7)
LIPASE SERPL-CCNC: 177 U/L (ref 73–393)
LYMPHOCYTES # BLD AUTO: 3.2 10E9/L (ref 0.8–5.3)
LYMPHOCYTES NFR BLD AUTO: 36 %
MCH RBC QN AUTO: 31.9 PG (ref 26.5–33)
MCHC RBC AUTO-ENTMCNC: 35.1 G/DL (ref 31.5–36.5)
MCV RBC AUTO: 91 FL (ref 78–100)
MONOCYTES # BLD AUTO: 0.5 10E9/L (ref 0–1.3)
MONOCYTES NFR BLD AUTO: 5.7 %
NEUTROPHILS # BLD AUTO: 4.9 10E9/L (ref 1.6–8.3)
NEUTROPHILS NFR BLD AUTO: 54.6 %
PLATELET # BLD AUTO: 220 10E9/L (ref 150–450)
POTASSIUM SERPL-SCNC: 3.7 MMOL/L (ref 3.4–5.3)
PROT SERPL-MCNC: 8.3 G/DL (ref 6.8–8.8)
RBC # BLD AUTO: 4.73 10E12/L (ref 3.8–5.2)
SODIUM SERPL-SCNC: 138 MMOL/L (ref 133–144)
WBC # BLD AUTO: 8.9 10E9/L (ref 4–11)

## 2018-06-20 PROCEDURE — 83690 ASSAY OF LIPASE: CPT | Performed by: INTERNAL MEDICINE

## 2018-06-20 PROCEDURE — 80053 COMPREHEN METABOLIC PANEL: CPT | Performed by: INTERNAL MEDICINE

## 2018-06-20 PROCEDURE — 85025 COMPLETE CBC W/AUTO DIFF WBC: CPT | Performed by: INTERNAL MEDICINE

## 2018-06-20 PROCEDURE — 99214 OFFICE O/P EST MOD 30 MIN: CPT | Performed by: INTERNAL MEDICINE

## 2018-06-20 PROCEDURE — 36415 COLL VENOUS BLD VENIPUNCTURE: CPT | Performed by: INTERNAL MEDICINE

## 2018-06-20 NOTE — PROGRESS NOTES
SUBJECTIVE:   Alia Jauregui is a 42 year old female who presents to clinic today for the following health issues:  Chief Complaint   Patient presents with     Constipation     x 4 months on/off, w/ bloating, right side abdominal pain and back pain          ABDOMINAL PAIN     Onset: x 4 months     Description:   Character: Dull ache and Cramping  Location: right side, sometimes generalized  Radiation: Back    Intensity: moderate    Progression of Symptoms:  worsening and waxing and waning    Accompanying Signs & Symptoms:  Fever/Chills?: no   Gas/Bloating: YES- a lot of bloating  Nausea: YES  Vomitting: YES- one episode, and gets the feeling she is going to vomit  Diarrhea?: no   Constipation:YES- will have sporadic episodes of constipation for 4 or 5 days at a time.   Will double up on probiotic at that time and constipation will resolve for a bit.   No blood in stool, no mucus in stool  Dysuria or Hematuria: no   Having some headaches   History:   Trauma: no   Previous similar pain: no    Previous tests done: none    Precipitating factors:   Does the pain change with:     Food: no  - unsure    BM: YES- after she clears enough stool out then the pain improves    Urination: no   Symptoms are somewhat worse with stress    Alleviating factors:    OTC probiotic    Therapies Tried and outcome: stool softener maybe combined with laxative but didn't really work.  Tried daily for 2 weeks.  Miralax, Milk of Magnesium- causes some churning in the stomach    LMP:  not applicable       Alia does report having a prior history of stool urgency after eating.  Has not noticed any particular food triggers.  History of lactose intolerance noted in chart.      She was not aware that atenolol refill was at pharmacy so she has been off this for a couple months.       Problem list and histories reviewed & adjusted, as indicated.  Additional history: as documented    Patient Active Problem List   Diagnosis     Polycystic ovaries      Hypertension goal BP (blood pressure) < 140/90     CARDIOVASCULAR SCREENING; LDL GOAL LESS THAN 160     Lactose intolerance     TONY (obstructive sleep apnea)     S/P hysterectomy     Past Surgical History:   Procedure Laterality Date      SECTION  2005    @ 24 weeks      SECTION      @ 29 weeks     D & C  3/4/08     LAPAROSCOPIC HYSTERECTOMY TOTAL, BILATERAL SALPINGO-OOPHORECTOMY, COMBINED N/A 2014    tlh, bilateral salpingectomy       Social History   Substance Use Topics     Smoking status: Never Smoker     Smokeless tobacco: Never Used     Alcohol use Yes      Comment: occassional      Family History   Problem Relation Age of Onset     Diabetes Maternal Grandmother      HEART DISEASE Maternal Grandmother      Open heart surgery, pacemaker     Circulatory Maternal Grandmother      Blood clot     Hypertension Maternal Grandmother      TONY     Thyroid Disease Maternal Grandmother      hypothyroidism     HEART DISEASE Paternal Grandfather      Hypertension Paternal Grandfather      Alcohol/Drug Paternal Grandfather      recovering     Hypertension Mother      Gynecology Mother      fibroids     Diabetes Mother      boarderline     Hypertension Father      Alcohol/Drug Maternal Grandfather      ETOH abuse     Eye Disorder Son      near sided     Thyroid Disease Son      hypothyroidism/hypothyroidism     Autism Spectrum Disorder Son      Dx 10/2015     Depression Son 11     Anxiety Disorder Son      Gynecology Sister      Fibroids     Eye Disorder Daughter      far sided     Thyroid Disease Daughter      hypothyroidism     Endocrine Disease Daughter      Growth delay, starting shots 2016     Ear Disorder Daughter      hearing aid in left ear     Cancer - colorectal Paternal Uncle      in his 40's, also lung     Other - See Comments Daughter 2     snored         Current Outpatient Prescriptions   Medication Sig Dispense Refill     atenolol (TENORMIN) 50 MG tablet TAKE ONE TABLET (50  "MG) BY MOUTH ONCE DAILY 90 tablet 2     loratadine (CLARITIN) 10 MG tablet Take 10 mg by mouth as needed        Probiotic Product (PROBIOTIC DAILY PO)        acetaminophen (TYLENOL) 325 MG tablet Take 1-2 tablets by mouth every 6 hours as needed.       ibuprofen (ADVIL,MOTRIN) 200 MG tablet Take 200 mg by mouth every 4 hours as needed.       order for DME Trilok Ankle Brace 2 Device 0     ORDER FOR DME Respironics Remstar 60 series auto 5-15 cm H2O, wisp s/m       Allergies   Allergen Reactions     Nkda [No Known Drug Allergies]        Reviewed and updated as needed this visit by clinical staff  Tobacco  Allergies  Med Hx  Surg Hx  Fam Hx  Soc Hx      Reviewed and updated as needed this visit by Provider         ROS:  Constitutional, gi and gu systems are negative, except as otherwise noted.    OBJECTIVE:     BP (!) 146/98 (BP Location: Right arm, Patient Position: Chair, Cuff Size: Adult Regular)  Pulse 90  Temp 99.3  F (37.4  C) (Tympanic)  Resp 14  Ht 5' 5.5\" (1.664 m)  Wt 183 lb 12.8 oz (83.4 kg)  LMP 12/10/2014  SpO2 95%  BMI 30.12 kg/m2  Body mass index is 30.12 kg/(m^2).    GENERAL: healthy, alert and no distress  RESP: lungs clear to auscultation - no rales, rhonchi or wheezes  CV: regular rate and rhythm, normal S1 S2, no S3 or S4, no murmur, click or rub  ABDOMEN: soft, very mild LLQ tenderness on initial palpation but no pain on repeat palpation, no rebound or guarding, no hepatosplenomegaly, no masses and bowel sounds normal      Diagnostic Test Results:  No results found for this or any previous visit (from the past 24 hour(s)).    ASSESSMENT/PLAN:       1. Abdominal pain, right lower quadrant    Abdominal pain seems most likely due to constipation, possibly IBS-C.  Symptoms are somewhat worse when stressed.  Advised keeping a food diary.  Recommended labs and CT to rule out other etiologies.  Daily stool softener was not effective- recommended trying BID.  Increase fiber.  Miralax and MOM " caused too much cramping.    If symptoms not improving could consider trial of Linzess.      - CBC with platelets differential  - Comprehensive metabolic panel  - Lipase  - CT Abdomen Pelvis w Contrast; Future    BP elevated, has been out of atenolol.  Resume and return in 2 weeks for BP recheck with RN.     Tariq Paige MD  [facility]

## 2018-06-20 NOTE — MR AVS SNAPSHOT
After Visit Summary   6/20/2018    Alia Jauregui    MRN: [id number]           Patient Information     Date Of Birth          1975        Visit Information        Provider Department      6/20/2018 4:00 PM Tariq Paige MD [facility]        Today's Diagnoses     Abdominal pain, right lower quadrant    -  1      Care Instructions    Abdominal CT scan was ordered.  Please call [phone] to schedule.     If everything looks okay, this may be due to irritable bowel syndrome.      Keep a food diary for a least two weeks to see if you can identify any food triggers of your symptoms.  Write down all of the foods that you eat, and then also right down when you experience symptoms and what those symptoms are.  Try to identify any possible triggers.  If you identify a possible trigger, next eliminate that food from your diet for two weeks and see if symptoms improve.  If they do, you can add that food back into your diet to see if symptoms resume or you can just continue to avoid that food.       For the constipation, increase your fiber.  Try taking the stool softener twice per day for awhile to see if that works better than once per day.    You can also try an enema or suppository.  If that still doesn't work, try the following recipe.      Omar Bullet recipe for constipation:    30ml Milk of Magnesia  30ml of mineral oil  8 ounces of prune juice     If symptoms are not improving, we can consider a prescription medication.  Let me know.           Follow-ups after your visit        Future tests that were ordered for you today     Open Future Orders        Priority Expected Expires Ordered    CT Abdomen Pelvis w Contrast Routine  6/20/2019 6/20/2018            Who to contact     If you have questions or need follow up information about today's clinic visit or your schedule please contact [facility] directly at [phone].  Normal or non-critical lab and imaging results  "will be communicated to you by MyChart, letter or phone within 4 business days after the clinic has received the results. If you do not hear from us within 7 days, please contact the clinic through [email or web address] or phone. If you have a critical or abnormal lab result, we will notify you by phone as soon as possible.  Submit refill requests through [email or web address] or call your pharmacy and they will forward the refill request to us. Please allow 3 business days for your refill to be completed.          Additional Information About Your Visit        [email or web address] Information     [email or web address] gives you secure access to your electronic health record. If you see a primary care provider, you can also send messages to your care team and make appointments. If you have questions, please call your primary care clinic.  If you do not have a primary care provider, please call [phone] and they will assist you.        Care EveryWhere ID     This is your Care EveryWhere ID. This could be used by other organizations to access your [place] medical records  [id number]        Your Vitals Were     Pulse Temperature Respirations Height Last Period Pulse Oximetry    90 99.3  F (37.4  C) (Tympanic) 14 5' 5.5\" (1.664 m) 12/10/2014 95%    BMI (Body Mass Index)                   30.12 kg/m2            Blood Pressure from Last 3 Encounters:   06/20/18 (!) 146/98   01/04/18 133/88   07/06/17 135/87    Weight from Last 3 Encounters:   06/20/18 183 lb 12.8 oz (83.4 kg)   01/04/18 177 lb 3.2 oz (80.4 kg)   07/06/17 188 lb (85.3 kg)              We Performed the Following     CBC with platelets differential     Comprehensive metabolic panel     Lipase        Primary Care Provider Office Phone # Fax #    Anna Rajan [phone] [address] [place] [address]        Equal Access to Services     KASSIDY WU : Ramona Parker, walenyda luqadaha, qaybta kaalmasantiago lamas, petey bae. So wa " [phone].    ATENCIÓN: Si stephanie goldman, tiene a wilson disposición servicios gratuitos de asistencia lingüística. Anthony sunshine [phone].    We comply with applicable federal civil rights laws and [place] laws. We do not discriminate on the basis of race, color, national origin, age, disability, sex, sexual orientation, or gender identity.            Thank you!     Thank you for choosing [facility]  for your care. Our goal is always to provide you with excellent care. Hearing back from our patients is one way we can continue to improve our services. Please take a few minutes to complete the written survey that you may receive in the mail after your visit with us. Thank you!             Your Updated Medication List - Protect others around you: Learn how to safely use, store and throw away your medicines at www.disposemymeds.org.          This list is accurate as of 6/20/18  4:32 PM.  Always use your most recent med list.                   Brand Name Dispense Instructions for use Diagnosis    atenolol 50 MG tablet    TENORMIN    90 tablet    TAKE ONE TABLET (50 MG) BY MOUTH ONCE DAILY    Hypertension goal BP (blood pressure) < 140/90       ibuprofen 200 MG tablet    ADVIL/MOTRIN     Take 200 mg by mouth every 4 hours as needed.        loratadine 10 MG tablet    CLARITIN     Take 10 mg by mouth as needed        order for DME      Respironics Remstar 60 series auto 5-15 cm H2O, wisp s/m    TONY (obstructive sleep apnea)       order for DME     2 Device    Trilok Ankle Brace    Ankle instability, unspecified laterality       PROBIOTIC DAILY PO           TYLENOL 325 MG tablet   Generic drug:  acetaminophen      Take 1-2 tablets by mouth every 6 hours as needed.

## 2018-06-20 NOTE — PATIENT INSTRUCTIONS
Abdominal CT scan was ordered.  Please call [phone] to schedule.     If everything looks okay, this may be due to irritable bowel syndrome.      Keep a food diary for a least two weeks to see if you can identify any food triggers of your symptoms.  Write down all of the foods that you eat, and then also right down when you experience symptoms and what those symptoms are.  Try to identify any possible triggers.  If you identify a possible trigger, next eliminate that food from your diet for two weeks and see if symptoms improve.  If they do, you can add that food back into your diet to see if symptoms resume or you can just continue to avoid that food.       For the constipation, increase your fiber.  Try taking the stool softener twice per day for awhile to see if that works better than once per day.    You can also try an enema or suppository.  If that still doesn't work, try the following recipe.      Omar Bullet recipe for constipation:    30ml Milk of Magnesia  30ml of mineral oil  8 ounces of prune juice     If symptoms are not improving, we can consider a prescription medication.  Let me know.

## 2018-06-22 ENCOUNTER — HOSPITAL ENCOUNTER (OUTPATIENT)
Dept: CT IMAGING | Facility: CLINIC | Age: 43
Discharge: HOME | End: 2018-06-22
Attending: INTERNAL MEDICINE | Admitting: INTERNAL MEDICINE
Payer: COMMERCIAL

## 2018-06-22 DIAGNOSIS — R10.31 ABDOMINAL PAIN, RIGHT LOWER QUADRANT: ICD-10-CM

## 2018-06-22 PROCEDURE — 25000125 ZZHC RX 250: Performed by: INTERNAL MEDICINE

## 2018-06-22 PROCEDURE — 25000128 H RX IP 250 OP 636: Performed by: INTERNAL MEDICINE

## 2018-06-22 PROCEDURE — 74177 CT ABD & PELVIS W/CONTRAST: CPT

## 2018-06-22 RX ORDER — IOPAMIDOL 755 MG/ML
90 INJECTION, SOLUTION INTRAVASCULAR ONCE
Status: COMPLETED | OUTPATIENT
Start: 2018-06-22 | End: 2018-06-22

## 2018-06-22 RX ADMIN — SODIUM CHLORIDE 62 ML: 9 INJECTION, SOLUTION INTRAVENOUS at 16:25

## 2018-06-22 RX ADMIN — IOPAMIDOL 90 ML: 755 INJECTION, SOLUTION INTRAVENOUS at 16:25

## 2018-06-25 ENCOUNTER — TELEPHONE (OUTPATIENT)
Dept: FAMILY MEDICINE | Facility: CLINIC | Age: 43
End: 2018-06-25

## 2018-06-27 ENCOUNTER — MYC MEDICAL ADVICE (OUTPATIENT)
Dept: FAMILY MEDICINE | Facility: CLINIC | Age: 43
End: 2018-06-27

## 2018-06-27 NOTE — TELEPHONE ENCOUNTER
Please see note below  Please advise if imaging of gall bladder is appropriate    Routing to provider.    Shanika FAGAN Rn

## 2018-07-05 ENCOUNTER — ALLIED HEALTH/NURSE VISIT (OUTPATIENT)
Dept: FAMILY MEDICINE | Facility: CLINIC | Age: 43
End: 2018-07-05
Payer: COMMERCIAL

## 2018-07-05 VITALS — DIASTOLIC BLOOD PRESSURE: 72 MMHG | SYSTOLIC BLOOD PRESSURE: 124 MMHG | HEART RATE: 65 BPM

## 2018-07-05 DIAGNOSIS — I10 HTN (HYPERTENSION): Primary | ICD-10-CM

## 2018-07-05 PROCEDURE — 99207 ZZC NO CHARGE NURSE ONLY: CPT

## 2018-07-05 NOTE — NURSING NOTE
S-(situation): Patient in clinic today for bp check.  Taking atenolol 50mg daily for bp control.  Denies s/s of high or low bp.  Patient was seen 6-20-18 for abdominal pain, bp elevated and patient stated she had been off her atenolol.  She has resumed taking her medication and denies s/e.      B-(background): Blood pressures in clinic:  1st reading = 124/72, HR - 65    A-(assessment): Bp at goal.    R-(recommendations): Please continue med regimen, healthy eating habits and regular exercise.    Deidre FAGAN RN

## 2018-07-05 NOTE — MR AVS SNAPSHOT
After Visit Summary   7/5/2018    Alia Jauregui    MRN: [id number]           Patient Information     Date Of Birth          1975        Visit Information        Provider Department      7/5/2018 4:00 PM FL CL RN [facility]        Today's Diagnoses     HTN (hypertension)    -  1       Follow-ups after your visit        Who to contact     If you have questions or need follow up information about today's clinic visit or your schedule please contact [facility] directly at [phone].  Normal or non-critical lab and imaging results will be communicated to you by Pearlfectionhart, letter or phone within 4 business days after the clinic has received the results. If you do not hear from us within 7 days, please contact the clinic through [employer] or phone. If you have a critical or abnormal lab result, we will notify you by phone as soon as possible.  Submit refill requests through Taodangpu or call your pharmacy and they will forward the refill request to us. Please allow 3 business days for your refill to be completed.          Additional Information About Your Visit        MyChart Information     Taodangpu gives you secure access to your electronic health record. If you see a primary care provider, you can also send messages to your care team and make appointments. If you have questions, please call your primary care clinic.  If you do not have a primary care provider, please call [phone] and they will assist you.        Care EveryWhere ID     This is your Care EveryWhere ID. This could be used by other organizations to access your [place] medical records  [id number]        Your Vitals Were     Pulse Last Period                65 12/10/2014           Blood Pressure from Last 3 Encounters:   07/05/18 124/72   06/20/18 (!) 146/98   01/04/18 133/88    Weight from Last 3 Encounters:   06/20/18 183 lb 12.8 oz (83.4 kg)   01/04/18 177 lb 3.2 oz (80.4 kg)   07/06/17 188 lb  (85.3 kg)              Today, you had the following     No orders found for display       Primary Care Provider Office Phone # Fax #    Tariq Paige [phone] [address] [facility] [address]        Equal Access to Services     NICOLEKASSIDY JAZMIN : Hadii aad ku haddeeo Soomaali, waaxda luqadaha, qaybta kaalmada adeegyada, waxay idiin hayaan adeeg khangelina lachristiano bae. So [facility] [phone].    ATENCIÓN: Si habla español, tiene a wilson disposición servicios gratuitos de asistencia lingüística. Llame al [phone].    We comply with applicable federal civil rights laws and [place] laws. We do not discriminate on the basis of race, color, national origin, age, disability, sex, sexual orientation, or gender identity.            Thank you!     Thank you for choosing [facility]  for your care. Our goal is always to provide you with excellent care. Hearing back from our patients is one way we can continue to improve our services. Please take a few minutes to complete the written survey that you may receive in the mail after your visit with us. Thank you!             Your Updated Medication List - Protect others around you: Learn how to safely use, store and throw away your medicines at www.disposemymeds.org.          This list is accurate as of 7/5/18  4:04 PM.  Always use your most recent med list.                   Brand Name Dispense Instructions for use Diagnosis    atenolol 50 MG tablet    TENORMIN    90 tablet    TAKE ONE TABLET (50 MG) BY MOUTH ONCE DAILY    Hypertension goal BP (blood pressure) < 140/90       ibuprofen 200 MG tablet    ADVIL/MOTRIN     Take 200 mg by mouth every 4 hours as needed.        loratadine 10 MG tablet    CLARITIN     Take 10 mg by mouth as needed        order for DME      Respironics Remstar 60 series auto 5-15 cm H2O, wisp s/m    TONY (obstructive sleep apnea)       order for DME     2 Device    Trilok Ankle Brace    Ankle instability, unspecified  laterality       PROBIOTIC DAILY PO           TYLENOL 325 MG tablet   Generic drug:  acetaminophen      Take 1-2 tablets by mouth every 6 hours as needed.

## 2018-08-07 DIAGNOSIS — R17 ELEVATED BILIRUBIN: ICD-10-CM

## 2018-08-07 LAB
BILIRUB DIRECT SERPL-MCNC: 0.3 MG/DL (ref 0–0.2)
BILIRUB SERPL-MCNC: 1.9 MG/DL (ref 0.2–1.3)

## 2018-08-07 PROCEDURE — 36415 COLL VENOUS BLD VENIPUNCTURE: CPT | Performed by: INTERNAL MEDICINE

## 2018-08-07 PROCEDURE — 82247 BILIRUBIN TOTAL: CPT | Performed by: INTERNAL MEDICINE

## 2018-08-07 PROCEDURE — 82248 BILIRUBIN DIRECT: CPT | Performed by: INTERNAL MEDICINE

## 2018-08-10 ENCOUNTER — OFFICE VISIT (OUTPATIENT)
Dept: FAMILY MEDICINE | Facility: CLINIC | Age: 43
End: 2018-08-10
Payer: COMMERCIAL

## 2018-08-10 VITALS
BODY MASS INDEX: 29.3 KG/M2 | DIASTOLIC BLOOD PRESSURE: 62 MMHG | WEIGHT: 178.8 LBS | RESPIRATION RATE: 14 BRPM | TEMPERATURE: 98.6 F | SYSTOLIC BLOOD PRESSURE: 116 MMHG | OXYGEN SATURATION: 97 % | HEART RATE: 66 BPM

## 2018-08-10 DIAGNOSIS — R10.11 RUQ ABDOMINAL PAIN: Primary | ICD-10-CM

## 2018-08-10 PROCEDURE — 99214 OFFICE O/P EST MOD 30 MIN: CPT | Performed by: INTERNAL MEDICINE

## 2018-08-10 ASSESSMENT — PAIN SCALES - GENERAL: PAINLEVEL: NO PAIN (0)

## 2018-08-10 NOTE — PROGRESS NOTES
SUBJECTIVE:   Alia Jauregui is a 42 year old female who presents to clinic today for the following health issues:    Chief Complaint   Patient presents with     Flank Pain     inconsistent side      ABDOMINAL PAIN/Right Flank Pain     Onset: x6 months    Description:   Character: Dull ache  Location: right upper quadrant   Radiation: occasional radiation into back     Intensity: moderate    Progression of Symptoms:  intermittent worsening and waxing and waning. Current pain 0/10    Accompanying Signs & Symptoms:  Fever/Chills?: YES-hot flashes  Gas/Bloating: YES  Nausea: YES earlier this week  Vomitting: YES  Diarrhea?: YES-over this last week   Constipation:YES- last BM 8/9/18 small to medium thin formed   Alternating between diarrhea and constipation  Having BMs every other day, small thin stools  No blood in stool, no black stools  Dysuria or Hematuria: no   Gets some heart burn- tried Makenzie   History:   Trauma: no   Previous similar pain: no    Previous tests done: only labs and CT at last office visit for same sx's    Precipitating factors:   Does the pain change with:     Food: no      BM: some improvement of bloating with passing diarrhea    Urination: no     Alleviating factors:  none    Therapies Tried and outcome: stool softeners, OTC probiotic - no help    LMP:  Years ago. Hysterectomy        I saw alia for this pain earlier this year and I thought that symptoms may be related to constipation and possible IBS.  We did a CT that showed fatty liver and non obstructive kidney stones.  Labs are significant for persistently elevated bilirubin but other liver tests are normal.      Problem list and histories reviewed & adjusted, as indicated.  Additional history: as documented    Patient Active Problem List   Diagnosis     Polycystic ovaries     Hypertension goal BP (blood pressure) < 140/90     CARDIOVASCULAR SCREENING; LDL GOAL LESS THAN 160     Lactose intolerance     TONY (obstructive sleep apnea)      S/P hysterectomy     Past Surgical History:   Procedure Laterality Date      SECTION  2005    @ 24 weeks      SECTION      @ 29 weeks     D & C  3/4/08     LAPAROSCOPIC HYSTERECTOMY TOTAL, BILATERAL SALPINGO-OOPHORECTOMY, COMBINED N/A 2014    tlh, bilateral salpingectomy       Social History   Substance Use Topics     Smoking status: Never Smoker     Smokeless tobacco: Never Used     Alcohol use Yes      Comment: occassional      Family History   Problem Relation Age of Onset     Diabetes Maternal Grandmother      HEART DISEASE Maternal Grandmother      Open heart surgery, pacemaker     Circulatory Maternal Grandmother      Blood clot     Hypertension Maternal Grandmother      TONY     Thyroid Disease Maternal Grandmother      hypothyroidism     HEART DISEASE Paternal Grandfather      Hypertension Paternal Grandfather      Alcohol/Drug Paternal Grandfather      recovering     Hypertension Mother      Gynecology Mother      fibroids     Diabetes Mother      boarderline     Hypertension Father      Alcohol/Drug Maternal Grandfather      ETOH abuse     Eye Disorder Son      near sided     Thyroid Disease Son      hypothyroidism/hypothyroidism     Autism Spectrum Disorder Son      Dx 10/2015     Depression Son 11     Anxiety Disorder Son      Gynecology Sister      Fibroids     Eye Disorder Daughter      far sided     Thyroid Disease Daughter      hypothyroidism     Endocrine Disease Daughter      Growth delay, starting shots 2016     Ear Disorder Daughter      hearing aid in left ear     Cancer - colorectal Paternal Uncle      in his 40's, also lung     Other - See Comments Daughter 2     snored         Current Outpatient Prescriptions   Medication Sig Dispense Refill     acetaminophen (TYLENOL) 325 MG tablet Take 1-2 tablets by mouth every 6 hours as needed.       atenolol (TENORMIN) 50 MG tablet TAKE ONE TABLET (50 MG) BY MOUTH ONCE DAILY 90 tablet 2     ibuprofen (ADVIL,MOTRIN) 200 MG  tablet Take 200 mg by mouth every 4 hours as needed.       loratadine (CLARITIN) 10 MG tablet Take 10 mg by mouth as needed        order for DME Trilok Ankle Brace 2 Device 0     ORDER FOR DME Respironics Remstar 60 series auto 5-15 cm H2O, wisp s/m       Probiotic Product (PROBIOTIC DAILY PO)        Allergies   Allergen Reactions     Nkda [No Known Drug Allergies]        Reviewed and updated as needed this visit by clinical staff       Reviewed and updated as needed this visit by Provider         ROS:  Constitutional, gi and gu systems are negative, except as otherwise noted.    OBJECTIVE:     /62 (BP Location: Right arm, Patient Position: Chair, Cuff Size: Adult Regular)  Pulse 66  Temp 98.6  F (37  C) (Tympanic)  Resp 14  Wt 178 lb 12.8 oz (81.1 kg)  LMP 12/10/2014  SpO2 97%  BMI 29.3 kg/m2  Body mass index is 29.3 kg/(m^2).    GENERAL: healthy, alert and no distress  RESP: lungs clear to auscultation - no rales, rhonchi or wheezes  CV: regular rate and rhythm, normal S1 S2, no S3 or S4, no murmur, click or rub  ABDOMEN: soft, mild RUQ tenderness, no rebound or guarding, no hepatosplenomegaly, no masses and bowel sounds normal      Diagnostic Test Results:  No results found for this or any previous visit (from the past 24 hour(s)).    ASSESSMENT/PLAN:       1. RUQ abdominal pain    Alia presents with ongoing RUQ pain.  IBS still a possibility.  Bilirubin remains elevated- unclear the significance of this, may be genetic though she did have a angelito one in the past.  Will proceed with HIDA scan for further gallbladder work-up.  Still not passing great BMs- recommended increasing stool softener to BID.      - NM Hepatobiliary Scan w GB EF; Future    Tariq Paige MD  [facility]

## 2018-08-10 NOTE — NURSING NOTE
"Chief Complaint   Patient presents with     Flank Pain     inconsistent side        Initial /62 (BP Location: Right arm, Patient Position: Chair, Cuff Size: Adult Regular)  Pulse 66  Temp 98.6  F (37  C) (Tympanic)  Resp 14  Wt 178 lb 12.8 oz (81.1 kg)  LMP 12/10/2014  SpO2 97%  BMI 29.3 kg/m2 Estimated body mass index is 29.3 kg/(m^2) as calculated from the following:    Height as of 6/20/18: 5' 5.5\" (1.664 m).    Weight as of this encounter: 178 lb 12.8 oz (81.1 kg).    Medication Reconciliation: complete  Rosemary Montes MA    "

## 2018-08-10 NOTE — PATIENT INSTRUCTIONS
HIDA gallbladder function test was ordered.  Please call [phone] to schedule.     Try taking the stool softener twice per day to see if that helps.

## 2018-08-10 NOTE — MR AVS SNAPSHOT
After Visit Summary   8/10/2018    Alia Jauregui    MRN: [id number]           Patient Information     Date Of Birth          1975        Visit Information        Provider Department      8/10/2018 4:00 PM Tariq Paige MD [facility]        Today's Diagnoses     RUQ abdominal pain    -  1      Care Instructions    HIDA gallbladder function test was ordered.  Please call [phone] to schedule.     Try taking the stool softener twice per day to see if that helps.           Follow-ups after your visit        Future tests that were ordered for you today     Open Future Orders        Priority Expected Expires Ordered    NM Hepatobiliary Scan w GB EF Routine  8/10/2019 8/10/2018            Who to contact     If you have questions or need follow up information about today's clinic visit or your schedule please contact [facility] directly at [phone].  Normal or non-critical lab and imaging results will be communicated to you by FRShart, letter or phone within 4 business days after the clinic has received the results. If you do not hear from us within 7 days, please contact the clinic through FRShart or phone. If you have a critical or abnormal lab result, we will notify you by phone as soon as possible.  Submit refill requests through Dimdim or call your pharmacy and they will forward the refill request to us. Please allow 3 business days for your refill to be completed.          Additional Information About Your Visit        MyChart Information     Dimdim gives you secure access to your electronic health record. If you see a primary care provider, you can also send messages to your care team and make appointments. If you have questions, please call your primary care clinic.  If you do not have a primary care provider, please call [phone] and they will assist you.        Care EveryWhere ID     This is your Care EveryWhere ID. This could be used by other  organizations to access your [place] medical records  [id number]        Your Vitals Were     Pulse Temperature Respirations Last Period Pulse Oximetry BMI (Body Mass Index)    66 98.6  F (37  C) (Tympanic) 14 12/10/2014 97% 29.3 kg/m2       Blood Pressure from Last 3 Encounters:   08/10/18 116/62   07/05/18 124/72   06/20/18 (!) 146/98    Weight from Last 3 Encounters:   08/10/18 178 lb 12.8 oz (81.1 kg)   06/20/18 183 lb 12.8 oz (83.4 kg)   01/04/18 177 lb 3.2 oz (80.4 kg)               Primary Care Provider Office Phone # Fax #    KayleyBelia Paige [phone] [address] [facility] [address]        Equal Access to Services     FELIPE WU : Hadii ruchi bunch hadasho Soomaali, waaxda luqadaha, qaybta kaalmada adeegyada, petey ma hayjono [place] . So [facility] [phone].    ATENCIÓN: Si habla español, tiene a wilson disposición servicios gratuitos de asistencia lingüística. Llame al [phone].    We comply with applicable federal civil rights laws and [place] laws. We do not discriminate on the basis of race, color, national origin, age, disability, sex, sexual orientation, or gender identity.            Thank you!     Thank you for choosing [facility]  for your care. Our goal is always to provide you with excellent care. Hearing back from our patients is one way we can continue to improve our services. Please take a few minutes to complete the written survey that you may receive in the mail after your visit with us. Thank you!             Your Updated Medication List - Protect others around you: Learn how to safely use, store and throw away your medicines at www.disposemymeds.org.          This list is accurate as of 8/10/18  4:47 PM.  Always use your most recent med list.                   Brand Name Dispense Instructions for use Diagnosis    atenolol 50 MG tablet    TENORMIN    90 tablet    TAKE ONE TABLET (50 MG) BY MOUTH ONCE DAILY    Hypertension goal BP  (blood pressure) < 140/90       ibuprofen 200 MG tablet    ADVIL/MOTRIN     Take 200 mg by mouth every 4 hours as needed.        loratadine 10 MG tablet    CLARITIN     Take 10 mg by mouth as needed        order for DME      Respironics Remstar 60 series auto 5-15 cm H2O, wisp s/m    TONY (obstructive sleep apnea)       order for DME     2 Device    Trilok Ankle Brace    Ankle instability, unspecified laterality       PROBIOTIC DAILY PO           TYLENOL 325 MG tablet   Generic drug:  acetaminophen      Take 1-2 tablets by mouth every 6 hours as needed.

## 2018-08-23 ENCOUNTER — HOSPITAL ENCOUNTER (OUTPATIENT)
Dept: NUCLEAR MEDICINE | Facility: CLINIC | Age: 43
Setting detail: NUCLEAR MEDICINE
Discharge: HOME | End: 2018-08-23
Attending: INTERNAL MEDICINE | Admitting: INTERNAL MEDICINE
Payer: COMMERCIAL

## 2018-08-23 DIAGNOSIS — K82.8 DYSKINESIA OF GALLBLADDER: Primary | ICD-10-CM

## 2018-08-23 DIAGNOSIS — R10.11 RUQ ABDOMINAL PAIN: ICD-10-CM

## 2018-08-23 PROCEDURE — 78227 HEPATOBIL SYST IMAGE W/DRUG: CPT

## 2018-08-23 PROCEDURE — A9537 TC99M MEBROFENIN: HCPCS | Performed by: INTERNAL MEDICINE

## 2018-08-23 PROCEDURE — 25000128 H RX IP 250 OP 636: Performed by: INTERNAL MEDICINE

## 2018-08-23 PROCEDURE — 34300033 ZZH RX 343: Performed by: INTERNAL MEDICINE

## 2018-08-23 RX ORDER — KIT FOR THE PREPARATION OF TECHNETIUM TC 99M MEBROFENIN 45 MG/10ML
4.3 INJECTION, POWDER, LYOPHILIZED, FOR SOLUTION INTRAVENOUS ONCE
Status: COMPLETED | OUTPATIENT
Start: 2018-08-23 | End: 2018-08-23

## 2018-08-23 RX ADMIN — SODIUM CHLORIDE 1.6 MCG: 9 INJECTION, SOLUTION INTRAMUSCULAR; INTRAVENOUS; SUBCUTANEOUS at 10:15

## 2018-08-23 RX ADMIN — MEBROFENIN 4.3 MILLICURIE: 45 INJECTION, POWDER, LYOPHILIZED, FOR SOLUTION INTRAVENOUS at 09:02

## 2018-08-27 ENCOUNTER — OFFICE VISIT (OUTPATIENT)
Dept: SURGERY | Facility: CLINIC | Age: 43
End: 2018-08-27
Payer: COMMERCIAL

## 2018-08-27 VITALS
WEIGHT: 179.4 LBS | HEIGHT: 66 IN | BODY MASS INDEX: 28.83 KG/M2 | TEMPERATURE: 99.2 F | DIASTOLIC BLOOD PRESSURE: 85 MMHG | RESPIRATION RATE: 16 BRPM | SYSTOLIC BLOOD PRESSURE: 125 MMHG | HEART RATE: 71 BPM

## 2018-08-27 DIAGNOSIS — K82.8 BILIARY DYSKINESIA: Primary | ICD-10-CM

## 2018-08-27 PROCEDURE — 99204 OFFICE O/P NEW MOD 45 MIN: CPT | Performed by: SURGERY

## 2018-08-27 NOTE — LETTER
2018         RE: Alia Jauregui  [address] [facility] [address]        Dear Colleague,    Thank you for referring your patient, Alia Jauregui, to the [facility]. Please see a copy of my visit note below.    PCP:  Tariq Paige    Chief complaint: Biliary dyskinesia, right upper quadrant pain    History of Present Illness: Patient is a 42-year-old healthy female who presents complaining of recent episodes of right upper quadrant pain. She was initially seen for this in  of this year by her primary care provider. The diagnosis was in question at that point so a CT scan was obtained. There were no specific findings on the CT scan. The gallbladder appeared normal.    She was seen again recently and a HIDA scan was obtained with gallbladder emptying. Her ejection fraction was calculated at 1% and she had severe 8 out of 10 pain with injection of cholecystokinin.    Her symptoms have been fairly consistent with biliary disease. She has intermittent episodes of right upper quadrant pain, bloating, nausea, intermittent vomiting. She has bowel irregularities. She has been more constipated lately but does not believe that the symptoms are related to constipation. Symptoms can occur daytime or nighttime. The frequency of these symptoms is increasing. No change in the color of her urine or stool. A recent check of her bilirubin showed it to be 1.9. Four years ago it was 1.7. She likely has Gilbert's disease.    She has had 2 C-sections through lower midline incisions and a laparoscopic assisted vaginal hysterectomy.    Histories:  Past Medical History:   Diagnosis Date     Gestational diabetes      Incompetence of cervix 2008-cerclage placed, Dr. Quezada following pt with mfm      Preeclampsia        Past Surgical History:   Procedure Laterality Date      SECTION  2005    @ 24 weeks      SECTION      @ 29 weeks     D & C  3/4/08     LAPAROSCOPIC  HYSTERECTOMY TOTAL, BILATERAL SALPINGO-OOPHORECTOMY, COMBINED N/A 12/24/2014    tlh, bilateral salpingectomy       Family History   Problem Relation Age of Onset     Diabetes Maternal Grandmother      HEART DISEASE Maternal Grandmother      Open heart surgery, pacemaker     Circulatory Maternal Grandmother      Blood clot     Hypertension Maternal Grandmother      TONY     Thyroid Disease Maternal Grandmother      hypothyroidism     HEART DISEASE Paternal Grandfather      Hypertension Paternal Grandfather      Alcohol/Drug Paternal Grandfather      recovering     Hypertension Mother      Gynecology Mother      fibroids     Diabetes Mother      boarderline     Hypertension Father      Alcohol/Drug Maternal Grandfather      ETOH abuse     Eye Disorder Son      near sided     Thyroid Disease Son      hypothyroidism/hypothyroidism     Autism Spectrum Disorder Son      Dx 10/2015     Depression Son 11     Anxiety Disorder Son      Gynecology Sister      Fibroids     Eye Disorder Daughter      far sided     Thyroid Disease Daughter      hypothyroidism     Endocrine Disease Daughter      Growth delay, starting shots 8/2016     Ear Disorder Daughter      hearing aid in left ear     Cancer - colorectal Paternal Uncle      in his 40's, also lung     Other - See Comments Daughter 2     snored       Social History   Substance Use Topics     Smoking status: Never Smoker     Smokeless tobacco: Never Used     Alcohol use Yes      Comment: occassional        Current Outpatient Prescriptions   Medication Sig Dispense Refill     acetaminophen (TYLENOL) 325 MG tablet Take 1-2 tablets by mouth every 6 hours as needed.       atenolol (TENORMIN) 50 MG tablet TAKE ONE TABLET (50 MG) BY MOUTH ONCE DAILY 90 tablet 2     ibuprofen (ADVIL,MOTRIN) 200 MG tablet Take 200 mg by mouth every 4 hours as needed.       loratadine (CLARITIN) 10 MG tablet Take 10 mg by mouth as needed        order for DME Trilok Ankle Brace 2 Device 0     ORDER FOR DME  Respironics Remstar 60 series auto 5-15 cm H2O, wisp s/m       Probiotic Product (PROBIOTIC DAILY PO)          Allergies   Allergen Reactions     Nkda [No Known Drug Allergies]        Images:  Recent Results (from the past 744 hour(s))   NM Hepatobiliary Scan w GB EF    Narrative    NM HEPATOBILIARY SCAN WITH GB EF 8/23/2018 10:47 AM    HISTORY: Right upper quadrant pain.    PROCEDURE: 4.3 mCi of Tc 99m Mebrofenin is given intravenously for  this study. For the gallbladder ejection fraction portion of the  study, 1.6 mcg of CCK is given intravenously.    FINDINGS: Gallbladder activity is identified at 25 minutes into the  study. Bowel activity is identified at 15 minutes into the study. The  gallbladder ejection fraction is calculated to be 1%. Usually greater  than 35% is considered normal.      Impression    IMPRESSION:  1. Normal visualization of gallbladder.  2. Gallbladder ejection fraction is abnormally low, suggesting  gallbladder dyskinesis.  3. The patient experienced right upper quadrant pain on a scale of  8/10 with the CCK injection.    LAVERNE QUINTANA MD       Labs:  Results for orders placed or performed during the hospital encounter of 08/23/18   NM Hepatobiliary Scan w GB EF    Narrative    NM HEPATOBILIARY SCAN WITH GB EF 8/23/2018 10:47 AM    HISTORY: Right upper quadrant pain.    PROCEDURE: 4.3 mCi of Tc 99m Mebrofenin is given intravenously for  this study. For the gallbladder ejection fraction portion of the  study, 1.6 mcg of CCK is given intravenously.    FINDINGS: Gallbladder activity is identified at 25 minutes into the  study. Bowel activity is identified at 15 minutes into the study. The  gallbladder ejection fraction is calculated to be 1%. Usually greater  than 35% is considered normal.      Impression    IMPRESSION:  1. Normal visualization of gallbladder.  2. Gallbladder ejection fraction is abnormally low, suggesting  gallbladder dyskinesis.  3. The patient experienced right upper  "quadrant pain on a scale of  8/10 with the CCK injection.    LAVERNE QUINTANA MD       ROS:  Constitutional - Denies fevers, weight loss, malaise, lethargy  Neuro - Denies tremors or seizures  Pulmon - Denies SOB, dyspnea, hemoptysis, chronic cough or use of an inhaler  CV - Denies CP, SOB, lower extremity edema, difficulty w/ stairs, has never used NTG  GI - Denies hematemesis, BRBPR, melena   - Denies hematuria, difficulty voiding, h/o STDs  Hematology - Denies blood clotting disorders, chronic anemias  Dermatology - No melanomas or skin cancers  Rheumatology - No h/o RA  Pysch - Denies depression, bipolar d/o or schizophrenia    /85 (BP Location: Left arm, Patient Position: Chair, Cuff Size: Adult Regular)  Pulse 71  Temp 99.2  F (37.3  C) (Oral)  Resp 16  Ht 1.664 m (5' 5.5\")  Wt 81.4 kg (179 lb 6.4 oz)  LMP 12/10/2014  BMI 29.4 kg/m2    Exam:  General - Alert and Oriented X4, NAD, well nourished  HEENT - Normocephalic, atraumatic,   Neck - supple, no LAD  Lungs -respirations unlabored, chest wall excursion is normal   CV - Heart RRR  Abdomen - Soft, non-tender, +BS, no hepatosplenomegaly, no palpable masses, well-healed lower midline scar  Groins - deferred  Rectal - deferred  Neuro - Full ROM, Strength 5/5 and major muscle groups, sensation intact  Extremities - No cyanosis, clubbing or edema.      Assessment and Plan: Biliary dyskinesia is confirmed on HIDA scan. I suspect that the patient may even have gallstones which have not been visualized. She's had a CT scan which is not good for detecting gallstones.  That would explain her pain as well as the poor emptying.    Nonetheless, she would benefit from a cholecystectomy. The laparoscopic approach was discussed in detail including risks, benefits, and alternatives.  proceed as soon as possible. She is tired of feeling the way she does.    We will put her on the schedule for next week Wednesday as an outpatient. She will see her primary care " provider for a preop. Orders were placed.        Baltazar Pickering MD FACS            Again, thank you for allowing me to participate in the care of your patient.        Sincerely,        Baltazar Pickering MD

## 2018-08-27 NOTE — NURSING NOTE
"Chief Complaint   Patient presents with     Consult     gallbladder dyskinesis       Initial /85 (BP Location: Left arm, Patient Position: Chair, Cuff Size: Adult Regular)  Pulse 71  Temp 99.2  F (37.3  C) (Oral)  Resp 16  Ht 1.664 m (5' 5.5\")  Wt 81.4 kg (179 lb 6.4 oz)  LMP 12/10/2014  BMI 29.4 kg/m2 Estimated body mass index is 29.4 kg/(m^2) as calculated from the following:    Height as of this encounter: 1.664 m (5' 5.5\").    Weight as of this encounter: 81.4 kg (179 lb 6.4 oz).  BP completed using cuff size: regular  Medications and allergies reviewed.      Jayde GRIFFITH CMA     "

## 2018-08-27 NOTE — MR AVS SNAPSHOT
After Visit Summary   8/27/2018    Alia Jauregui    MRN: [id number]           Patient Information     Date Of Birth          1975        Visit Information        Provider Department      8/27/2018 2:30 PM Baltazar Pickering MD [facility]        Today's Diagnoses     Biliary dyskinesia    -  1      Care Instructions    Per Physician's instructions            Follow-ups after your visit        Your next 10 appointments already scheduled     Sep 07, 2018  4:00 PM CDT   [facility]    [address] [facility] [address]   [phone]            Sep 24, 2018  9:00 AM CDT   [facility] Op with [facility]    [address] [facility] [address]   [phone]              Who to contact     If you have questions or need follow up information about today's clinic visit or your schedule please contact [facility] directly at [phone].  Normal or non-critical lab and imaging results will be communicated to you by Twitchhart, letter or phone within 4 business days after the clinic has received the results. If you do not hear from us within 7 days, please contact the clinic through [employer] or phone. If you have a critical or abnormal lab result, we will notify you by phone as soon as possible.  Submit refill requests through [employer] or call your pharmacy and they will forward the refill request to us. Please allow 3 business days for your refill to be completed.          Additional Information About Your Visit        Twitchhart Information     [employer] gives you secure access to your electronic health record. If you see a primary care provider, you can also send messages to your care team and make appointments. If you have questions, please call your primary care clinic.  If you do not have a primary care provider, please call [phone]  "and they will assist you.        Care EveryWhere ID     This is your Care EveryWhere ID. This could be used by other organizations to access your [facility] medical records  [id number]        Your Vitals Were     Pulse Temperature Respirations Height Last Period BMI (Body Mass Index)    71 99.2  F (37.3  C) (Oral) 16 1.664 m (5' 5.5\") 12/10/2014 29.4 kg/m2       Blood Pressure from Last 3 Encounters:   08/27/18 125/85   08/10/18 116/62   07/05/18 124/72    Weight from Last 3 Encounters:   08/27/18 81.4 kg (179 lb 6.4 oz)   08/10/18 81.1 kg (178 lb 12.8 oz)   06/20/18 83.4 kg (183 lb 12.8 oz)              Today, you had the following     No orders found for display       Primary Care Provider Office Phone # Fax #    KayleyBelia Paige [phone] [address] [facility] [address]        Equal Access to Services     KASSIDY WU : Hadii ruchi bunch hadasho Soomaali, waaxda luqadaha, qaybta kaalmada adeegyasantiago, petey copeland . So [facility] [phone].    ATENCIÓN: Si habla español, tiene a wilson disposición servicios gratuitos de asistencia lingüística. Llame al [phone].    We comply with applicable federal civil rights laws and [place] laws. We do not discriminate on the basis of race, color, national origin, age, disability, sex, sexual orientation, or gender identity.            Thank you!     Thank you for choosing [facility]  for your care. Our goal is always to provide you with excellent care. Hearing back from our patients is one way we can continue to improve our services. Please take a few minutes to complete the written survey that you may receive in the mail after your visit with us. Thank you!             Your Updated Medication List - Protect others around you: Learn how to safely use, store and throw away your medicines at www.disposemymeds.org.          This list is accurate as of 8/27/18  4:47 PM.  Always use your most recent med list.             "       Brand Name Dispense Instructions for use Diagnosis    atenolol 50 MG tablet    TENORMIN    90 tablet    TAKE ONE TABLET (50 MG) BY MOUTH ONCE DAILY    Hypertension goal BP (blood pressure) < 140/90       ibuprofen 200 MG tablet    ADVIL/MOTRIN     Take 200 mg by mouth every 4 hours as needed.        loratadine 10 MG tablet    CLARITIN     Take 10 mg by mouth as needed        order for DME      Respironics Remstar 60 series auto 5-15 cm H2O, wisp s/m    TONY (obstructive sleep apnea)       order for DME     2 Device    Trilok Ankle Brace    Ankle instability, unspecified laterality       PROBIOTIC DAILY PO           TYLENOL 325 MG tablet   Generic drug:  acetaminophen      Take 1-2 tablets by mouth every 6 hours as needed.

## 2018-08-27 NOTE — PROGRESS NOTES
PCP:  Tariq Paige    Chief complaint: Biliary dyskinesia, right upper quadrant pain    History of Present Illness: Patient is a 42-year-old healthy female who presents complaining of recent episodes of right upper quadrant pain. She was initially seen for this in  of this year by her primary care provider. The diagnosis was in question at that point so a CT scan was obtained. There were no specific findings on the CT scan. The gallbladder appeared normal.    She was seen again recently and a HIDA scan was obtained with gallbladder emptying. Her ejection fraction was calculated at 1% and she had severe 8 out of 10 pain with injection of cholecystokinin.    Her symptoms have been fairly consistent with biliary disease. She has intermittent episodes of right upper quadrant pain, bloating, nausea, intermittent vomiting. She has bowel irregularities. She has been more constipated lately but does not believe that the symptoms are related to constipation. Symptoms can occur daytime or nighttime. The frequency of these symptoms is increasing. No change in the color of her urine or stool. A recent check of her bilirubin showed it to be 1.9. Four years ago it was 1.7. She likely has Gilbert's disease.    She has had 2 C-sections through lower midline incisions and a laparoscopic assisted vaginal hysterectomy.    Histories:  Past Medical History:   Diagnosis Date     Gestational diabetes 2008     Incompetence of cervix 2008-cerclage placed, Dr. Quezada following pt with mfm      Preeclampsia        Past Surgical History:   Procedure Laterality Date      SECTION  2005    @ 24 weeks      SECTION      @ 29 weeks     D & C  3/4/08     LAPAROSCOPIC HYSTERECTOMY TOTAL, BILATERAL SALPINGO-OOPHORECTOMY, COMBINED N/A 2014    tlh, bilateral salpingectomy       Family History   Problem Relation Age of Onset     Diabetes Maternal Grandmother      HEART DISEASE Maternal Grandmother       Open heart surgery, pacemaker     Circulatory Maternal Grandmother      Blood clot     Hypertension Maternal Grandmother      TONY     Thyroid Disease Maternal Grandmother      hypothyroidism     HEART DISEASE Paternal Grandfather      Hypertension Paternal Grandfather      Alcohol/Drug Paternal Grandfather      recovering     Hypertension Mother      Gynecology Mother      fibroids     Diabetes Mother      boarderline     Hypertension Father      Alcohol/Drug Maternal Grandfather      ETOH abuse     Eye Disorder Son      near sided     Thyroid Disease Son      hypothyroidism/hypothyroidism     Autism Spectrum Disorder Son      Dx 10/2015     Depression Son 11     Anxiety Disorder Son      Gynecology Sister      Fibroids     Eye Disorder Daughter      far sided     Thyroid Disease Daughter      hypothyroidism     Endocrine Disease Daughter      Growth delay, starting shots 8/2016     Ear Disorder Daughter      hearing aid in left ear     Cancer - colorectal Paternal Uncle      in his 40's, also lung     Other - See Comments Daughter 2     snored       Social History   Substance Use Topics     Smoking status: Never Smoker     Smokeless tobacco: Never Used     Alcohol use Yes      Comment: occassional        Current Outpatient Prescriptions   Medication Sig Dispense Refill     acetaminophen (TYLENOL) 325 MG tablet Take 1-2 tablets by mouth every 6 hours as needed.       atenolol (TENORMIN) 50 MG tablet TAKE ONE TABLET (50 MG) BY MOUTH ONCE DAILY 90 tablet 2     ibuprofen (ADVIL,MOTRIN) 200 MG tablet Take 200 mg by mouth every 4 hours as needed.       loratadine (CLARITIN) 10 MG tablet Take 10 mg by mouth as needed        order for DME Trilok Ankle Brace 2 Device 0     ORDER FOR DME Respironics Remstar 60 series auto 5-15 cm H2O, wisp s/m       Probiotic Product (PROBIOTIC DAILY PO)          Allergies   Allergen Reactions     Nkda [No Known Drug Allergies]        Images:  Recent Results (from the past 744 hour(s))   NM  Hepatobiliary Scan w GB EF    Narrative    NM HEPATOBILIARY SCAN WITH GB EF 8/23/2018 10:47 AM    HISTORY: Right upper quadrant pain.    PROCEDURE: 4.3 mCi of Tc 99m Mebrofenin is given intravenously for  this study. For the gallbladder ejection fraction portion of the  study, 1.6 mcg of CCK is given intravenously.    FINDINGS: Gallbladder activity is identified at 25 minutes into the  study. Bowel activity is identified at 15 minutes into the study. The  gallbladder ejection fraction is calculated to be 1%. Usually greater  than 35% is considered normal.      Impression    IMPRESSION:  1. Normal visualization of gallbladder.  2. Gallbladder ejection fraction is abnormally low, suggesting  gallbladder dyskinesis.  3. The patient experienced right upper quadrant pain on a scale of  8/10 with the CCK injection.    LAVERNE QUINTANA MD       Labs:  Results for orders placed or performed during the hospital encounter of 08/23/18   NM Hepatobiliary Scan w GB EF    Narrative    NM HEPATOBILIARY SCAN WITH GB EF 8/23/2018 10:47 AM    HISTORY: Right upper quadrant pain.    PROCEDURE: 4.3 mCi of Tc 99m Mebrofenin is given intravenously for  this study. For the gallbladder ejection fraction portion of the  study, 1.6 mcg of CCK is given intravenously.    FINDINGS: Gallbladder activity is identified at 25 minutes into the  study. Bowel activity is identified at 15 minutes into the study. The  gallbladder ejection fraction is calculated to be 1%. Usually greater  than 35% is considered normal.      Impression    IMPRESSION:  1. Normal visualization of gallbladder.  2. Gallbladder ejection fraction is abnormally low, suggesting  gallbladder dyskinesis.  3. The patient experienced right upper quadrant pain on a scale of  8/10 with the CCK injection.    LAVERNE QUINTANA MD       ROS:  Constitutional - Denies fevers, weight loss, malaise, lethargy  Neuro - Denies tremors or seizures  Pulmon - Denies SOB, dyspnea, hemoptysis, chronic cough or  "use of an inhaler  CV - Denies CP, SOB, lower extremity edema, difficulty w/ stairs, has never used NTG  GI - Denies hematemesis, BRBPR, melena   - Denies hematuria, difficulty voiding, h/o STDs  Hematology - Denies blood clotting disorders, chronic anemias  Dermatology - No melanomas or skin cancers  Rheumatology - No h/o RA  Pysch - Denies depression, bipolar d/o or schizophrenia    /85 (BP Location: Left arm, Patient Position: Chair, Cuff Size: Adult Regular)  Pulse 71  Temp 99.2  F (37.3  C) (Oral)  Resp 16  Ht 1.664 m (5' 5.5\")  Wt 81.4 kg (179 lb 6.4 oz)  LMP 12/10/2014  BMI 29.4 kg/m2    Exam:  General - Alert and Oriented X4, NAD, well nourished  HEENT - Normocephalic, atraumatic,   Neck - supple, no LAD  Lungs -respirations unlabored, chest wall excursion is normal   CV - Heart RRR  Abdomen - Soft, non-tender, +BS, no hepatosplenomegaly, no palpable masses, well-healed lower midline scar  Groins - deferred  Rectal - deferred  Neuro - Full ROM, Strength 5/5 and major muscle groups, sensation intact  Extremities - No cyanosis, clubbing or edema.      Assessment and Plan: Biliary dyskinesia is confirmed on HIDA scan. I suspect that the patient may even have gallstones which have not been visualized. She's had a CT scan which is not good for detecting gallstones.  That would explain her pain as well as the poor emptying.    Nonetheless, she would benefit from a cholecystectomy. The laparoscopic approach was discussed in detail including risks, benefits, and alternatives.  proceed as soon as possible. She is tired of feeling the way she does.    We will put her on the schedule for next week Wednesday as an outpatient. She will see her primary care provider for a preop. Orders were placed.        Baltazar Pickering MD FACS          "

## 2018-09-07 ENCOUNTER — OFFICE VISIT (OUTPATIENT)
Dept: FAMILY MEDICINE | Facility: CLINIC | Age: 43
End: 2018-09-07
Payer: COMMERCIAL

## 2018-09-07 VITALS
HEART RATE: 78 BPM | RESPIRATION RATE: 14 BRPM | SYSTOLIC BLOOD PRESSURE: 122 MMHG | TEMPERATURE: 99 F | WEIGHT: 181.2 LBS | OXYGEN SATURATION: 98 % | DIASTOLIC BLOOD PRESSURE: 86 MMHG | BODY MASS INDEX: 29.69 KG/M2

## 2018-09-07 DIAGNOSIS — K82.8 DYSKINESIA OF GALLBLADDER: ICD-10-CM

## 2018-09-07 DIAGNOSIS — I10 HYPERTENSION GOAL BP (BLOOD PRESSURE) < 140/90: ICD-10-CM

## 2018-09-07 DIAGNOSIS — G47.33 OSA (OBSTRUCTIVE SLEEP APNEA): ICD-10-CM

## 2018-09-07 DIAGNOSIS — Z01.818 PREOP GENERAL PHYSICAL EXAM: Primary | ICD-10-CM

## 2018-09-07 PROCEDURE — 99215 OFFICE O/P EST HI 40 MIN: CPT | Performed by: INTERNAL MEDICINE

## 2018-09-07 PROCEDURE — 93000 ELECTROCARDIOGRAM COMPLETE: CPT | Performed by: INTERNAL MEDICINE

## 2018-09-07 ASSESSMENT — PAIN SCALES - GENERAL: PAINLEVEL: MILD PAIN (3)

## 2018-09-07 NOTE — MR AVS SNAPSHOT
After Visit Summary   9/7/2018    Alia Jauregui    MRN: [id number]           Patient Information     Date Of Birth          1975        Visit Information        Provider Department      9/7/2018 4:00 PM Tariq Paige MD [facility]        Today's Diagnoses     Preop general physical exam    -  1    Dyskinesia of gallbladder        TONY (obstructive sleep apnea)        Hypertension goal BP (blood pressure) < 140/90          Care Instructions    Avoid ibuprofen for 1 day prior to surgery, avoid naproxen for 3 days prior, and avoid products containing aspirin for 1 week before surgery.  Tylenol is okay to take for pain if needed.       Before Your Surgery      Call your surgeon if there is any change in your health. This includes signs of a cold or flu (such as a sore throat, runny nose, cough, rash or fever).    Do not smoke, drink alcohol or take over the counter medicine (unless your surgeon or primary care doctor tells you to) for the 24 hours before and after surgery.    If you take prescribed drugs: Follow your doctor s orders about which medicines to take and which to stop until after surgery.    Eating and drinking prior to surgery: follow the instructions from your surgeon    Take a shower or bath the night before surgery. Use the soap your surgeon gave you to gently clean your skin. If you do not have soap from your surgeon, use your regular soap. Do not shave or scrub the surgery site.  Wear clean pajamas and have clean sheets on your bed.           Follow-ups after your visit        Your next 10 appointments already scheduled     Sep 12, 2018   Procedure with Baltazar Pickering MD   [facility] (--)    [address] [place] [id number]   [phone]           The medical center is located at [address] [place] [address]. (between [address] in [place], [place] miles north of [place]).            Sep 24, 2018  9:00 AM CDT   Post Op with Baltazar MEDLEY  MD Raheel   [facility]    [address] [facility] [address] [phone]              Who to contact     If you have questions or need follow up information about today's clinic visit or your schedule please contact [facility] directly at [phone].  Normal or non-critical lab and imaging results will be communicated to you by MyChart, letter or phone within 4 business days after the clinic has received the results. If you do not hear from us within 7 days, please contact the clinic through [facility] or phone. If you have a critical or abnormal lab result, we will notify you by phone as soon as possible.  Submit refill requests through [employer] or call your pharmacy and they will forward the refill request to us. Please allow 3 business days for your refill to be completed.          Additional Information About Your Visit        [facility] Information     [employer] gives you secure access to your electronic health record. If you see a primary care provider, you can also send messages to your care team and make appointments. If you have questions, please call your primary care clinic.  If you do not have a primary care provider, please call [phone] and they will assist you.        Care EveryWhere ID     This is your Care EveryWhere ID. This could be used by other organizations to access your [place] medical records  [id number]        Your Vitals Were     Pulse Temperature Respirations Last Period Pulse Oximetry BMI (Body Mass Index)    78 99  F (37.2  C) (Tympanic) 14 12/10/2014 98% 29.69 kg/m2       Blood Pressure from Last 3 Encounters:   09/07/18 122/86   08/27/18 125/85   08/10/18 116/62    Weight from Last 3 Encounters:   09/07/18 181 lb 3.2 oz (82.2 kg)   08/27/18 179 lb 6.4 oz (81.4 kg)   08/10/18 178 lb 12.8 oz (81.1 kg)              We Performed the Following     EKG 12-lead complete w/read - Clinics        Primary Care Provider Office  Phone # Fax #    KayleyBelia Paige [phone] [address] [facility] [address]        Equal Access to Services     FELIPE WU : Hadii aad ku haddeepedro Sosrinivasa, walenyda luqadaha, qalatoyata kaalmada adams, petey arenas gerikat lan min bae. So [facility] [phone].    ATENCIÓN: Si habla español, tiene a wilson disposición servicios gratuitos de asistencia lingüística. Llame al [phone].    We comply with applicable federal civil rights laws and [place] laws. We do not discriminate on the basis of race, color, national origin, age, disability, sex, sexual orientation, or gender identity.            Thank you!     Thank you for choosing [facility]  for your care. Our goal is always to provide you with excellent care. Hearing back from our patients is one way we can continue to improve our services. Please take a few minutes to complete the written survey that you may receive in the mail after your visit with us. Thank you!             Your Updated Medication List - Protect others around you: Learn how to safely use, store and throw away your medicines at www.disposemymeds.org.          This list is accurate as of 9/7/18  4:21 PM.  Always use your most recent med list.                   Brand Name Dispense Instructions for use Diagnosis    atenolol 50 MG tablet    TENORMIN    90 tablet    TAKE ONE TABLET (50 MG) BY MOUTH ONCE DAILY    Hypertension goal BP (blood pressure) < 140/90       ibuprofen 200 MG tablet    ADVIL/MOTRIN     Take 200 mg by mouth every 4 hours as needed.        loratadine 10 MG tablet    CLARITIN     Take 10 mg by mouth as needed        order for DME      Respironics Remstar 60 series auto 5-15 cm H2O, wisp s/m    TONY (obstructive sleep apnea)       order for DME     2 Device    Trilok Ankle Brace    Ankle instability, unspecified laterality       PROBIOTIC DAILY PO           TYLENOL 325 MG tablet   Generic drug:  acetaminophen      Take 1-2 tablets by  mouth every 6 hours as needed.

## 2018-09-07 NOTE — PATIENT INSTRUCTIONS
Avoid ibuprofen for 1 day prior to surgery, avoid naproxen for 3 days prior, and avoid products containing aspirin for 1 week before surgery.  Tylenol is okay to take for pain if needed.       Before Your Surgery      Call your surgeon if there is any change in your health. This includes signs of a cold or flu (such as a sore throat, runny nose, cough, rash or fever).    Do not smoke, drink alcohol or take over the counter medicine (unless your surgeon or primary care doctor tells you to) for the 24 hours before and after surgery.    If you take prescribed drugs: Follow your doctor s orders about which medicines to take and which to stop until after surgery.    Eating and drinking prior to surgery: follow the instructions from your surgeon    Take a shower or bath the night before surgery. Use the soap your surgeon gave you to gently clean your skin. If you do not have soap from your surgeon, use your regular soap. Do not shave or scrub the surgery site.  Wear clean pajamas and have clean sheets on your bed.

## 2018-09-07 NOTE — PROGRESS NOTES
[facility]  [address] [address]  [phone]  Dept: [phone]    PRE-OP EVALUATION:  Today's date: 2018    Alia Jauregui (: 1975) presents for pre-operative evaluation assessment as requested by Dr. Pickering.  She requires evaluation and anesthesia risk assessment prior to undergoing surgery/procedure for treatment of Gallbladder dyskinesia .    Primary Physician: Tariq Paige  Type of Anesthesia Anticipated: General    Patient has a Health Care Directive or Living Will:  NO    Preop Questions 2014   Who is doing your surgery? Dr. Pickering   What are you having done? Cholecystectomy    Date of Surgery/Procedure: 18   Facility or Hospital where procedure/surgery will be performed: [facility]   1.  Do you have a history of Heart attack, stroke, stent, coronary bypass surgery, or other heart surgery? No   2.  Do you ever have any pain or discomfort in your chest? No   3.  Do you have a history of  Heart Failure? No   4.   Are you troubled by shortness of breath when:  walking on a level surface, or up a slight hill, or at night? No   5.  Do you currently have a cold, bronchitis or other respiratory infection? No   6.  Do you have a cough, shortness of breath, or wheezing? No   7.  Do you sometimes get pains in the calves of your legs when you walk? No   8. Do you or anyone in your family have previous history of blood clots? No   9.  Do you or does anyone in your family have a serious bleeding problem such as prolonged bleeding following surgeries or cuts? No   10. Have you ever had problems with anemia or been told to take iron pills? No   11. Have you had any abnormal blood loss such as black, tarry or bloody stools, or abnormal vaginal bleeding? No    12. Have you ever had a blood transfusion? No   13. Have you or any of your relatives ever had problems with anesthesia? YES - slow to wake up in her mother and daughter, and maternal  grandmother.  She personally has had nausea after surgery   14. Do you have sleep apnea, excessive snoring or daytime drowsiness? YES - mild TONY, has CPAP   15. Do you have any prosthetic heart valves? No   16. Do you have prosthetic joints? No   17. Is there any chance that you may be pregnant? No         HPI:     HPI related to upcoming procedure: I saw Alia on August 10 for right upper quadrant pain and ultimately ended up doing a HIDA scan that was very abnormal with severely reduced gallbladder ejection fraction, so I recommended consult with surgery, and they agreed that cholecystectomy would be reasonable to see if that will help out with her symptoms.      See problem list for active medical problems.  Problems all longstanding and stable, except as noted/documented.  See ROS for pertinent symptoms related to these conditions.                                                                                                                                                          .    MEDICAL HISTORY:     Patient Active Problem List    Diagnosis Date Noted     S/P hysterectomy 12/24/2014     Priority: Medium     Ovaries in       TONY (obstructive sleep apnea) 03/06/2014     Priority: Medium     Moderate TONY (2/27/2014 with AHI 18.8, shanita desat 89%)       Lactose intolerance 03/11/2011     Priority: Medium     CARDIOVASCULAR SCREENING; LDL GOAL LESS THAN 160 10/31/2010     Priority: Medium     Hypertension goal BP (blood pressure) < 140/90 09/08/2007     Priority: Medium     excellent control of bp - with some minimal LH - continue med, expect LH to improve.- already has. change from 25 mg tart bid to succ 25 mg daily.       Polycystic ovaries 07/31/2007     Priority: Medium      Past Medical History:   Diagnosis Date     Gestational diabetes 2008     Incompetence of cervix 7/25/2008 7/22/08-cerclage placed, Dr. Quezada following pt with mfm      Preeclampsia      Past Surgical History:   Procedure  Laterality Date      SECTION  2005    @ 24 weeks      SECTION      @ 29 weeks     D & C  3/4/08     LAPAROSCOPIC HYSTERECTOMY TOTAL, BILATERAL SALPINGO-OOPHORECTOMY, COMBINED N/A 2014    tlh, bilateral salpingectomy     Current Outpatient Prescriptions   Medication Sig Dispense Refill     acetaminophen (TYLENOL) 325 MG tablet Take 1-2 tablets by mouth every 6 hours as needed.       atenolol (TENORMIN) 50 MG tablet TAKE ONE TABLET (50 MG) BY MOUTH ONCE DAILY 90 tablet 2     ibuprofen (ADVIL,MOTRIN) 200 MG tablet Take 200 mg by mouth every 4 hours as needed.       loratadine (CLARITIN) 10 MG tablet Take 10 mg by mouth as needed        order for DME Trilok Ankle Brace 2 Device 0     ORDER FOR DME Respironics Remstar 60 series auto 5-15 cm H2O, wisp s/m       Probiotic Product (PROBIOTIC DAILY PO)        OTC products: None, except as noted above    Allergies   Allergen Reactions     Nkda [No Known Drug Allergies]       Latex Allergy: NO    Social History   Substance Use Topics     Smoking status: Never Smoker     Smokeless tobacco: Never Used     Alcohol use Yes      Comment: occassional      History   Drug Use No       REVIEW OF SYSTEMS:   Constitutional, neuro, ENT, endocrine, pulmonary, cardiac, gastrointestinal, genitourinary, musculoskeletal, integument and psychiatric systems are negative, except as otherwise noted plus some headache and nausea.    EXAM:   /86 (BP Location: Right arm, Patient Position: Chair, Cuff Size: Adult Regular)  Pulse 78  Temp 99  F (37.2  C) (Tympanic)  Resp 14  Wt 181 lb 3.2 oz (82.2 kg)  LMP 12/10/2014  SpO2 98%  BMI 29.69 kg/m2      GENERAL APPEARANCE: healthy, alert and no distress     HENT: normal ear canals and TMs, nose and mouth without ulcers or lesions     NECK: no adenopathy, no asymmetry, masses, or scars     RESP: lungs clear to auscultation - no rales, rhonchi or wheezes     CV: regular rates and rhythm, normal S1 S2, no S3 or S4 and  no murmur, click or rub -     ABDOMEN:  soft, RUQ pain, no HSM or masses and bowel sounds normal     MS: extremities normal- no gross deformities noted, no evidence of inflammation in joints       NEURO: mentation intact and speech normal     PSYCH: mentation appears normal. and affect normal/bright     LYMPHATICS: No cervical or supraclavicular nodes     DIAGNOSTICS:   EKG: appears normal, NSR, normal axis, normal intervals, no acute ST/T changes c/w ischemia, no LVH by voltage criteria    Recent Labs   Lab Test  06/20/18   1637  12/25/14   0635  11/03/14   1054   HGB  15.1  12.4  14.9   PLT  220   --   200   NA  138  140   --    POTASSIUM  3.7  4.3   --    CR  0.79  0.79   --         IMPRESSION:   Reason for surgery/procedure: Cholecystectomy for biliary dyskinesia  Diagnosis/reason for consult: Preop evaluation    The proposed surgical procedure is considered INTERMEDIATE risk.    REVISED CARDIAC RISK INDEX  The patient has the following serious cardiovascular risks for perioperative complications such as (MI, PE, VFib and 3  AV Block):  No serious cardiac risks  INTERPRETATION: 0 risks: Class I (very low risk - 0.4% complication rate)    The patient has the following additional risks for perioperative complications:  No identified additional risks      ICD-10-CM    1. Preop general physical exam Z01.818 EKG 12-lead complete w/read - Clinics   2. Dyskinesia of gallbladder K82.8    3. TONY (obstructive sleep apnea) G47.33    4. Hypertension goal BP (blood pressure) < 140/90 I10        RECOMMENDATIONS:       Cardiovascular Risk  Performs 4 METs exercise without symptoms (Climb a flight of stairs) .   EKG was done due to her history of hypertension and this was normal.  No further cardiac workup needed  Continue beta blocker for HTN      Obstructive Sleep Apnea (or suspected sleep apnea)  Patient is should continue to use their home CPAP when released from surgery      --Patient is to take all scheduled medications  on the day of surgery EXCEPT for modifications listed below.    Anticoagulant or Antiplatelet Medication Use  NSAIDS: Ibuprofen (Motrin):         Stop one day prior to surgery        APPROVAL GIVEN to proceed with proposed procedure, without further diagnostic evaluation       Signed Electronically by: Tariq Paige MD    Copy of this evaluation report is provided to requesting physician.    [facility] Preop Guidelines    Revised Cardiac Risk Index

## 2018-09-07 NOTE — NURSING NOTE
"Chief Complaint   Patient presents with     Pre-Op Exam       Initial /86 (BP Location: Right arm, Patient Position: Chair, Cuff Size: Adult Regular)  Pulse 78  Temp 99  F (37.2  C) (Tympanic)  Resp 14  Wt 181 lb 3.2 oz (82.2 kg)  LMP 12/10/2014  SpO2 98%  BMI 29.69 kg/m2 Estimated body mass index is 29.69 kg/(m^2) as calculated from the following:    Height as of 8/27/18: 5' 5.5\" (1.664 m).    Weight as of this encounter: 181 lb 3.2 oz (82.2 kg).    Medication Reconciliation: complete  Rosemary Montes MA  "

## 2018-09-11 ENCOUNTER — ANESTHESIA EVENT (OUTPATIENT)
Dept: SURGERY | Facility: CLINIC | Age: 43
End: 2018-09-11
Payer: COMMERCIAL

## 2018-09-12 ENCOUNTER — ANESTHESIA (OUTPATIENT)
Dept: SURGERY | Facility: CLINIC | Age: 43
End: 2018-09-12
Payer: COMMERCIAL

## 2018-09-12 ENCOUNTER — HOSPITAL ENCOUNTER (OUTPATIENT)
Facility: CLINIC | Age: 43
Discharge: HOME | End: 2018-09-12
Attending: SURGERY | Admitting: SURGERY
Payer: COMMERCIAL

## 2018-09-12 VITALS
RESPIRATION RATE: 16 BRPM | BODY MASS INDEX: 29.09 KG/M2 | OXYGEN SATURATION: 97 % | DIASTOLIC BLOOD PRESSURE: 68 MMHG | SYSTOLIC BLOOD PRESSURE: 120 MMHG | TEMPERATURE: 98.6 F | HEIGHT: 66 IN | WEIGHT: 181 LBS

## 2018-09-12 DIAGNOSIS — G89.18 POST-OP PAIN: Primary | ICD-10-CM

## 2018-09-12 PROCEDURE — 25000564 ZZH DESFLURANE, EA 15 MIN: Performed by: SURGERY

## 2018-09-12 PROCEDURE — 37000008 ZZH ANESTHESIA TECHNICAL FEE, 1ST 30 MIN: Performed by: SURGERY

## 2018-09-12 PROCEDURE — 25000128 H RX IP 250 OP 636: Performed by: SURGERY

## 2018-09-12 PROCEDURE — 25000128 H RX IP 250 OP 636: Performed by: NURSE ANESTHETIST, CERTIFIED REGISTERED

## 2018-09-12 PROCEDURE — 47562 LAPAROSCOPIC CHOLECYSTECTOMY: CPT | Mod: AS | Performed by: PHYSICIAN ASSISTANT

## 2018-09-12 PROCEDURE — 47562 LAPAROSCOPIC CHOLECYSTECTOMY: CPT | Performed by: SURGERY

## 2018-09-12 PROCEDURE — 88304 TISSUE EXAM BY PATHOLOGIST: CPT | Mod: 26 | Performed by: SURGERY

## 2018-09-12 PROCEDURE — 25000125 ZZHC RX 250: Performed by: NURSE ANESTHETIST, CERTIFIED REGISTERED

## 2018-09-12 PROCEDURE — 36000093 ZZH SURGERY LEVEL 4 1ST 30 MIN: Performed by: SURGERY

## 2018-09-12 PROCEDURE — 71000027 ZZH RECOVERY PHASE 2 EACH 15 MINS: Performed by: SURGERY

## 2018-09-12 PROCEDURE — 25000125 ZZHC RX 250: Performed by: SURGERY

## 2018-09-12 PROCEDURE — 71000012 ZZH RECOVERY PHASE 1 LEVEL 1 FIRST HR: Performed by: SURGERY

## 2018-09-12 PROCEDURE — 40000305 ZZH STATISTIC PRE PROC ASSESS I: Performed by: SURGERY

## 2018-09-12 PROCEDURE — 71000013 ZZH RECOVERY PHASE 1 LEVEL 1 EA ADDTL HR: Performed by: SURGERY

## 2018-09-12 PROCEDURE — 88304 TISSUE EXAM BY PATHOLOGIST: CPT | Performed by: SURGERY

## 2018-09-12 PROCEDURE — 25000132 ZZH RX MED GY IP 250 OP 250 PS 637: Performed by: SURGERY

## 2018-09-12 PROCEDURE — 37000009 ZZH ANESTHESIA TECHNICAL FEE, EACH ADDTL 15 MIN: Performed by: SURGERY

## 2018-09-12 PROCEDURE — 27210794 ZZH OR GENERAL SUPPLY STERILE: Performed by: SURGERY

## 2018-09-12 PROCEDURE — 36000063 ZZH SURGERY LEVEL 4 EA 15 ADDTL MIN: Performed by: SURGERY

## 2018-09-12 RX ORDER — CEFAZOLIN SODIUM 1 G/50ML
1 INJECTION, SOLUTION INTRAVENOUS SEE ADMIN INSTRUCTIONS
Status: DISCONTINUED | OUTPATIENT
Start: 2018-09-12 | End: 2018-09-12 | Stop reason: HOSPADM

## 2018-09-12 RX ORDER — FENTANYL CITRATE 50 UG/ML
INJECTION, SOLUTION INTRAMUSCULAR; INTRAVENOUS PRN
Status: DISCONTINUED | OUTPATIENT
Start: 2018-09-12 | End: 2018-09-12

## 2018-09-12 RX ORDER — ONDANSETRON 4 MG/1
4 TABLET, ORALLY DISINTEGRATING ORAL EVERY 30 MIN PRN
Status: DISCONTINUED | OUTPATIENT
Start: 2018-09-12 | End: 2018-09-12 | Stop reason: HOSPADM

## 2018-09-12 RX ORDER — FENTANYL CITRATE 50 UG/ML
25-50 INJECTION, SOLUTION INTRAMUSCULAR; INTRAVENOUS
Status: DISCONTINUED | OUTPATIENT
Start: 2018-09-12 | End: 2018-09-12 | Stop reason: HOSPADM

## 2018-09-12 RX ORDER — ONDANSETRON 2 MG/ML
INJECTION INTRAMUSCULAR; INTRAVENOUS PRN
Status: DISCONTINUED | OUTPATIENT
Start: 2018-09-12 | End: 2018-09-12

## 2018-09-12 RX ORDER — SODIUM CHLORIDE, SODIUM LACTATE, POTASSIUM CHLORIDE, CALCIUM CHLORIDE 600; 310; 30; 20 MG/100ML; MG/100ML; MG/100ML; MG/100ML
INJECTION, SOLUTION INTRAVENOUS CONTINUOUS
Status: DISCONTINUED | OUTPATIENT
Start: 2018-09-12 | End: 2018-09-12 | Stop reason: HOSPADM

## 2018-09-12 RX ORDER — GLYCOPYRROLATE 0.2 MG/ML
INJECTION, SOLUTION INTRAMUSCULAR; INTRAVENOUS PRN
Status: DISCONTINUED | OUTPATIENT
Start: 2018-09-12 | End: 2018-09-12

## 2018-09-12 RX ORDER — MEPERIDINE HYDROCHLORIDE 50 MG/ML
12.5 INJECTION INTRAMUSCULAR; INTRAVENOUS; SUBCUTANEOUS
Status: DISCONTINUED | OUTPATIENT
Start: 2018-09-12 | End: 2018-09-12 | Stop reason: HOSPADM

## 2018-09-12 RX ORDER — DEXAMETHASONE SODIUM PHOSPHATE 4 MG/ML
INJECTION, SOLUTION INTRA-ARTICULAR; INTRALESIONAL; INTRAMUSCULAR; INTRAVENOUS; SOFT TISSUE PRN
Status: DISCONTINUED | OUTPATIENT
Start: 2018-09-12 | End: 2018-09-12

## 2018-09-12 RX ORDER — KETOROLAC TROMETHAMINE 30 MG/ML
30 INJECTION, SOLUTION INTRAMUSCULAR; INTRAVENOUS EVERY 6 HOURS PRN
Status: DISCONTINUED | OUTPATIENT
Start: 2018-09-12 | End: 2018-09-12 | Stop reason: HOSPADM

## 2018-09-12 RX ORDER — LIDOCAINE HYDROCHLORIDE 10 MG/ML
INJECTION, SOLUTION EPIDURAL; INFILTRATION; INTRACAUDAL; PERINEURAL PRN
Status: DISCONTINUED | OUTPATIENT
Start: 2018-09-12 | End: 2018-09-12

## 2018-09-12 RX ORDER — PROPOFOL 10 MG/ML
INJECTION, EMULSION INTRAVENOUS PRN
Status: DISCONTINUED | OUTPATIENT
Start: 2018-09-12 | End: 2018-09-12

## 2018-09-12 RX ORDER — HYDROCODONE BITARTRATE AND ACETAMINOPHEN 5; 325 MG/1; MG/1
1-2 TABLET ORAL EVERY 4 HOURS PRN
Status: DISCONTINUED | OUTPATIENT
Start: 2018-09-12 | End: 2018-09-12 | Stop reason: HOSPADM

## 2018-09-12 RX ORDER — NALOXONE HYDROCHLORIDE 0.4 MG/ML
.1-.4 INJECTION, SOLUTION INTRAMUSCULAR; INTRAVENOUS; SUBCUTANEOUS
Status: DISCONTINUED | OUTPATIENT
Start: 2018-09-12 | End: 2018-09-12 | Stop reason: HOSPADM

## 2018-09-12 RX ORDER — INDOCYANINE GREEN AND WATER 25 MG
2.5 KIT INJECTION ONCE
Status: COMPLETED | OUTPATIENT
Start: 2018-09-12 | End: 2018-09-12

## 2018-09-12 RX ORDER — ONDANSETRON 2 MG/ML
4 INJECTION INTRAMUSCULAR; INTRAVENOUS EVERY 30 MIN PRN
Status: DISCONTINUED | OUTPATIENT
Start: 2018-09-12 | End: 2018-09-12 | Stop reason: HOSPADM

## 2018-09-12 RX ORDER — INDOCYANINE GREEN AND WATER 25 MG
KIT INJECTION PRN
Status: DISCONTINUED | OUTPATIENT
Start: 2018-09-12 | End: 2018-09-12

## 2018-09-12 RX ORDER — CEFAZOLIN SODIUM 2 G/100ML
2 INJECTION, SOLUTION INTRAVENOUS
Status: COMPLETED | OUTPATIENT
Start: 2018-09-12 | End: 2018-09-12

## 2018-09-12 RX ORDER — HYDROCODONE BITARTRATE AND ACETAMINOPHEN 5; 325 MG/1; MG/1
1-2 TABLET ORAL EVERY 4 HOURS PRN
Qty: 20 TABLET | Refills: 0 | Status: SHIPPED | OUTPATIENT
Start: 2018-09-12 | End: 2018-11-10

## 2018-09-12 RX ORDER — HYDROMORPHONE HYDROCHLORIDE 1 MG/ML
.3-.5 INJECTION, SOLUTION INTRAMUSCULAR; INTRAVENOUS; SUBCUTANEOUS EVERY 10 MIN PRN
Status: DISCONTINUED | OUTPATIENT
Start: 2018-09-12 | End: 2018-09-12 | Stop reason: HOSPADM

## 2018-09-12 RX ORDER — LIDOCAINE 40 MG/G
CREAM TOPICAL
Status: DISCONTINUED | OUTPATIENT
Start: 2018-09-12 | End: 2018-09-12 | Stop reason: HOSPADM

## 2018-09-12 RX ORDER — NEOSTIGMINE METHYLSULFATE 1 MG/ML
VIAL (ML) INJECTION PRN
Status: DISCONTINUED | OUTPATIENT
Start: 2018-09-12 | End: 2018-09-12

## 2018-09-12 RX ORDER — BUPIVACAINE HYDROCHLORIDE AND EPINEPHRINE 5; 5 MG/ML; UG/ML
INJECTION, SOLUTION PERINEURAL PRN
Status: DISCONTINUED | OUTPATIENT
Start: 2018-09-12 | End: 2018-09-12 | Stop reason: HOSPADM

## 2018-09-12 RX ORDER — SCOLOPAMINE TRANSDERMAL SYSTEM 1 MG/1
1 PATCH, EXTENDED RELEASE TRANSDERMAL ONCE
Status: COMPLETED | OUTPATIENT
Start: 2018-09-12 | End: 2018-09-12

## 2018-09-12 RX ORDER — ALBUTEROL SULFATE 0.83 MG/ML
2.5 SOLUTION RESPIRATORY (INHALATION) EVERY 4 HOURS PRN
Status: DISCONTINUED | OUTPATIENT
Start: 2018-09-12 | End: 2018-09-12 | Stop reason: HOSPADM

## 2018-09-12 RX ADMIN — SCOPALAMINE 1 PATCH: 1 PATCH, EXTENDED RELEASE TRANSDERMAL at 08:24

## 2018-09-12 RX ADMIN — FENTANYL CITRATE 50 MCG: 50 INJECTION, SOLUTION INTRAMUSCULAR; INTRAVENOUS at 08:58

## 2018-09-12 RX ADMIN — DEXAMETHASONE SODIUM PHOSPHATE 8 MG: 4 INJECTION, SOLUTION INTRA-ARTICULAR; INTRALESIONAL; INTRAMUSCULAR; INTRAVENOUS; SOFT TISSUE at 09:01

## 2018-09-12 RX ADMIN — SODIUM CHLORIDE, POTASSIUM CHLORIDE, SODIUM LACTATE AND CALCIUM CHLORIDE: 600; 310; 30; 20 INJECTION, SOLUTION INTRAVENOUS at 08:24

## 2018-09-12 RX ADMIN — CEFAZOLIN SODIUM 2 G: 2 INJECTION, SOLUTION INTRAVENOUS at 08:57

## 2018-09-12 RX ADMIN — KETOROLAC TROMETHAMINE 30 MG: 30 INJECTION, SOLUTION INTRAMUSCULAR at 10:59

## 2018-09-12 RX ADMIN — LIDOCAINE HYDROCHLORIDE 1 ML: 10 INJECTION, SOLUTION EPIDURAL; INFILTRATION; INTRACAUDAL; PERINEURAL at 08:24

## 2018-09-12 RX ADMIN — FENTANYL CITRATE 50 MCG: 50 INJECTION, SOLUTION INTRAMUSCULAR; INTRAVENOUS at 08:59

## 2018-09-12 RX ADMIN — GLYCOPYRROLATE 0.2 MG: 0.2 INJECTION, SOLUTION INTRAMUSCULAR; INTRAVENOUS at 09:01

## 2018-09-12 RX ADMIN — ROCURONIUM BROMIDE 10 MG: 10 INJECTION INTRAVENOUS at 09:00

## 2018-09-12 RX ADMIN — GLYCOPYRROLATE 0.5 MG: 0.2 INJECTION, SOLUTION INTRAMUSCULAR; INTRAVENOUS at 09:45

## 2018-09-12 RX ADMIN — INDOCYANINE GREEN 2.5 MG: 25 INJECTION, POWDER, LYOPHILIZED, FOR SOLUTION INTRAVENOUS at 08:31

## 2018-09-12 RX ADMIN — FENTANYL CITRATE 150 MCG: 50 INJECTION, SOLUTION INTRAMUSCULAR; INTRAVENOUS at 09:01

## 2018-09-12 RX ADMIN — MIDAZOLAM 2 MG: 1 INJECTION INTRAMUSCULAR; INTRAVENOUS at 08:57

## 2018-09-12 RX ADMIN — PROPOFOL 170 MG: 10 INJECTION, EMULSION INTRAVENOUS at 09:01

## 2018-09-12 RX ADMIN — Medication 3 MG: at 09:45

## 2018-09-12 RX ADMIN — FENTANYL CITRATE 50 MCG: 50 INJECTION INTRAMUSCULAR; INTRAVENOUS at 10:37

## 2018-09-12 RX ADMIN — HYDROCODONE BITARTRATE AND ACETAMINOPHEN 2 TABLET: 5; 325 TABLET ORAL at 12:30

## 2018-09-12 RX ADMIN — LIDOCAINE HYDROCHLORIDE 50 MG: 10 INJECTION, SOLUTION EPIDURAL; INFILTRATION; INTRACAUDAL; PERINEURAL at 09:01

## 2018-09-12 RX ADMIN — ONDANSETRON 4 MG: 2 INJECTION INTRAMUSCULAR; INTRAVENOUS at 09:01

## 2018-09-12 RX ADMIN — FENTANYL CITRATE 50 MCG: 50 INJECTION INTRAMUSCULAR; INTRAVENOUS at 10:55

## 2018-09-12 RX ADMIN — INDOCYANINE GREEN 2.5 MG: KIT INTRAVENOUS at 09:21

## 2018-09-12 RX ADMIN — ROCURONIUM BROMIDE 20 MG: 10 INJECTION INTRAVENOUS at 09:01

## 2018-09-12 NOTE — ANESTHESIA PREPROCEDURE EVALUATION
Anesthesia Evaluation     . Pt has had prior anesthetic. Type: General    History of anesthetic complications   - PONV        ROS/MED HX    ENT/Pulmonary:     (+)sleep apnea, uses CPAP , . .    Neurologic:  - neg neurologic ROS     Cardiovascular:     (+) hypertension----. : . . . :. .       METS/Exercise Tolerance:  >4 METS   Hematologic:  - neg hematologic  ROS       Musculoskeletal:  - neg musculoskeletal ROS       GI/Hepatic:  - neg GI/hepatic ROS       Renal/Genitourinary:  - ROS Renal section negative       Endo:  - neg endo ROS       Psychiatric:  - neg psychiatric ROS       Infectious Disease:  - neg infectious disease ROS       Malignancy:      - no malignancy   Other:    - neg other ROS                 Physical Exam  Normal systems: cardiovascular, pulmonary and dental    Airway   Mallampati: II  TM distance: >3 FB  Neck ROM: full    Dental     Cardiovascular       Pulmonary                     Anesthesia Plan      History & Physical Review      ASA Status:  2 .    NPO Status:  > 8 hours    Plan for General and ETT with Propofol induction. Maintenance will be Balanced.    PONV prophylaxis:  Ondansetron (or other 5HT-3), Dexamethasone or Solumedrol and Scopolamine patch  Additional equipment: Videolaryngoscope      Postoperative Care  Postoperative pain management:  IV analgesics and Oral pain medications.      Consents  Anesthetic plan, risks, benefits and alternatives discussed with:  Patient..                          .

## 2018-09-12 NOTE — OP NOTE
Procedure Date: 09/12/2018      DATE OF PROCEDURE:  09/12/2018.      PREOPERATIVE DIAGNOSIS:  Biliary dyskinesia.      POSTOPERATIVE DIAGNOSIS:  Biliary dyskinesia.      PROCEDURES PERFORMED:  Laparoscopic cholecystectomy.      SURGEON:  Baltazar Pickering MD.      ASSISTANT:  Giuseppe Dean PA-C.  I requested his assistance for his expertise with camera management, laparoscopic instrumentation, hemostasis and assistance with wound closure.      ANESTHESIA:  General.      INDICATIONS:  This 43-year-old female presented with severe right upper quadrant pain.  She had very poor biliary ejection and pain with injection of the cholecystokinin.  A cholecystectomy was recommended.  Prior to the procedure, she was counseled about the risks, benefits and alternatives of the procedure, and she agreed to proceed.  All of her questions were answered.      DESCRIPTION OF PROCEDURE:  The patient was brought to the operating room, placed on the table in the supine position.  After induction of adequate general endotracheal anesthesia, the patient's abdomen was prepped and draped in the usual sterile fashion.  A surgical timeout was performed at this point to identify both the patient and the proposed procedure.  All present were in agreement.      A small infraumbilical incision was made and dissection bluntly carried down to the fascia.  The fascia was incised in the midline and the peritoneal cavity bluntly entered.  Two stay sutures of 0 Vicryl were placed to elevate the fascia.  The Rachel trocar was placed and the abdomen insufflated to 15 mmHg pressure with CO2 gas.  Three further 5 mm ports were placed along the right upper quadrant under direct vision.  The patient was then placed in a steep reverse Trendelenburg position with the left side rotated downward.  The fundus of the gallbladder was then grasped and retracted superiorly and anteriorly exposing the infundibulum.  There were a fair number of adhesions to the  gallbladder, and these were taken down bluntly.  The peritoneum overlying the cystic duct was incised and critical structures were identified.  Using indocyanine green and fluorescence, we were able to identify this common bile duct, the cystic artery and even the right hepatic artery.  Anatomy was clear.  Therefore, no cholangiograms were obtained.  The cystic duct and cystic artery were both clipped and divided with scissors.  There seemed to be another smaller branch of the artery that was clipped as well.  The right hepatic artery was positively identified and preserved.  The gallbladder was then removed from the gallbladder bed using cautery.  The gallbladder was placed into an Endobag and retrieved through the infraumbilical trocar site.  The Rachel trocar was replaced and the abdomen reinsufflated.  The right upper quadrant was irrigated with copious amounts of saline irrigation.  The irrigant returned as clear.  There was no bleeding, no bile leak and clips were identified to be intact.  All the ports were removed under direct vision.  No bleeding was seen.  All the wounds were infiltrated with Marcaine with epinephrine.  The infraumbilical fascia was closed with 2 figure-of-eight interrupted 0 Vicryl sutures.  All the wounds were then closed with subcuticular 4-0 Monocryl suture.  Dermabond was used as the dressing.  The patient was then awakened from her anesthetic and taken to the recovery room awake and in stable condition.  She tolerated the procedure well.  There were no complications.  Needle, sponge, and instrument counts were correct x 2.  Blood loss was estimated at less than 5 mL.         CARLOS ALBERTO LOZANO MD             D: 2018   T: 2018   MT: ADRIAN      Name:     VALENTINE SHOEMAKER   MRN:      [id number]        Account:        [id number]   :      1975           Procedure Date: 2018      Document: [id number]

## 2018-09-12 NOTE — OR NURSING
Pt resting sitting up in chair. O2 taper continues and pt has cpap at home to increase/ protect her airway. Pt is comfortable and sleeping

## 2018-09-12 NOTE — ANESTHESIA POSTPROCEDURE EVALUATION
Patient: Alia Jauregui    Procedure(s):  Laparoscopic CHOLECYSTECTOMY with Possible CHOLANGIOGRAMS,with Indocyanine injection - Wound Class: II-Clean Contaminated    Diagnosis:biliary dyskinesia  Diagnosis Additional Information: No value filed.    Anesthesia Type:  General, ETT    Note:  Anesthesia Post Evaluation    Patient location during evaluation: Phase 2  Patient participation: Able to fully participate in evaluation  Level of consciousness: awake and alert  Pain management: adequate  Airway patency: patent  Cardiovascular status: acceptable and hemodynamically stable  Respiratory status: acceptable, room air and spontaneous ventilation  Hydration status: acceptable  PONV: none     Anesthetic complications: None          Last vitals:  Vitals:    09/12/18 1415 09/12/18 1425 09/12/18 1530   BP:   120/68   Resp:   16   Temp:      SpO2: 96% 97% 97%         Electronically Signed By: RIO Kenney CRNA  September 12, 2018  5:04 PM

## 2018-09-12 NOTE — IP AVS SNAPSHOT
MRN:[id number]                      After Visit Summary   9/12/2018    Alia Jauregui    MRN: [id number]           Thank you!     Thank you for choosing [facility] for your care. Our goal is always to provide you with excellent care. Hearing back from our patients is one way we can continue to improve our services. Please take a few minutes to complete the written survey that you may receive in the mail after you visit with us. Thank you!        Patient Information     Date Of Birth          1975        About your hospital stay     You were admitted on:  September 12, 2018 You last received care in the:  [facility] PreOP/Phase II    You were discharged on:  September 12, 2018       Who to Call     For medical emergencies, please call 911.  For non-urgent questions about your medical care, please call your primary care provider or clinic, [phone]  For questions related to your surgery, please call your surgery clinic        Attending Provider     Provider Specialty    Baltazar Pickering MD Surgery       Primary Care Provider Office Phone # Fax #    KayleyBelia Paige [phone]      Your next 10 appointments already scheduled     Sep 24, 2018  9:00 AM CDT   Post Op with Baltazar Pickering MD   [facility]    [address] [address] [phone]              Further instructions from your care team       Wash incisions daily with soap and water. Some mild redness or swelling is expected. If draining, cover with dry gauze.    No lifting restrictions.  You may lift as comfort permits..    Okay to use ice pack over wound as necessary for comfort.    Use pain medication as necessary but avoid constipating side effects. Ibuprofen okay but avoid Tylenol as your pain medication already contains this.    Diet as tolerated. No restrictions.    Follow up with Dr Pickering in 1-2 weeks.    You may return to work when you feel up to  it. Most patients take 1-2 weeks off. You may return sooner as pain allows. During your follow-up appointment, Dr. Pickering will give you a formal letter for your work with any restrictions detailed if needed.                           Same Day Surgery Discharge Instructions  Special Precautions After Surgery - Adult    1. It is not unusual to feel lightheaded or faint, up to 24 hours after surgery or while taking pain medication.  If you have these symptoms; sit for a few minutes before standing and have someone assist you when getting up.  2. You should rest and relax for the next 24 hours and must have someone stay with you for at least 24 hours after your discharge.  3. DO NOT DRIVE any vehicle or operate mechanical equipment for 24 hours following the end of your surgery.  DO NOT DRIVE while taking narcotic pain medications that have been prescribed by your physician.  If you had a limb operated on, you must be able to use it fully to drive.  4. DO NOT drink alcoholic beverages for 24 hours following surgery or while taking prescription pain medication.  5. Drink clear liquids (apple juice, ginger ale, broth, 7-Up, etc.).  Progress to your regular diet as you feel able.  6. Any questions call your physician and do not make important decisions for 24 hours.    ACTIVITY  ? Rest today.  No activity or diet restrictions.  ? Resume activity as tolerated.  ? Restrictions: per Dr Pickering instructions  ? See printed discharge sheet.     INCISIONAL CARE  ? May  shower after 24 hours.  ? Apply ice 1/2 hour on and 1/2 hour off while awake.  ? Be alert for signs of infection:  redness, swelling, heat, drainage of pus, and/or elevated temperature.  Contact your doctor if these occur.        Call for an appointment to return to the clinic in 7-14 days.    Medications:  ? Hydrocodone (Norco, Vicodin):  Next dose: as needed.  ? Ibuprofen (Motrin, Advil):  Next dose: as needed but not before 5 pm.  ? Stool softeners to prevent  "constipation   ? Follow the instructions on the bottle.     Additional discharge instructions: Discharge instructions for Patient with Scopolamine Transdermal Patch    1.  You may leave the patch on behind your ear for three days-But NO LONGER.  May have withdrawal symptoms (nausea, vomiting, headache,dizziness) if used longer.  2.  When you remove the patch, you must wash and dry your hands thoroughly and before touching your eyes, as pupils may dilate.  3.  Discard patch (away from children and pets).  4.  May develop some urinary hesitancy or urine retention.  5.  Patch should be removed by: 9-15-18    __________________________________________________________________________________________________________________________________  IMPORTANT NUMBERS:    [facility] Main Number:  [phone], [phone]  Pharmacy:  [phone]  Same Day Surgery:  [phone], Monday - Friday until 8:30 p.m.  Urgent Care:  [phone]  Emergency Room:  [phone]      Surgery Specialty Clinic:  [phone]                         Pending Results     Date and Time Order Name Status Description    9/12/2018 0932 Surgical pathology exam In process             Admission Information     Date & Time Provider Department Dept. Phone    9/12/2018 Baltazar Pickering MD [facility] PreOP/Phase [phone]      Your Vitals Were     Blood Pressure Temperature Respirations Height Weight Last Period    120/66 98.6  F (37  C) (Oral) 14 1.664 m (5' 5.5\") 82.1 kg (181 lb) 12/10/2014    Pulse Oximetry BMI (Body Mass Index)                94% 29.66 kg/m2          [employer] Information     [employer] gives you secure access to your electronic health record. If you see a primary care provider, you can also send messages to your care team and make appointments. If you have questions, please call your primary care clinic.  If you do not have a primary care provider, please call [phone] and they will assist you.        Care EveryWhere ID     This " is your Care EveryWhere ID. This could be used by other organizations to access your [facility] medical records  [id number]        Equal Access to Services     FELIPE WU : Hadii aad ku haddeepedro Parker, walenyda dioniciomarsha, jessenia kahiginioda adams, petey radhain hayaapierre nevarezkat lan laleeannapierre kathia. So [facility] [phone].    ATENCIÓN: Si habla español, tiene a wilson disposición servicios gratuitos de asistencia lingüística. Llame al [phone].    We comply with applicable federal civil rights laws and [place] laws. We do not discriminate on the basis of race, color, national origin, age, disability, sex, sexual orientation, or gender identity.               Review of your medicines      START taking        Dose / Directions    HYDROcodone-acetaminophen 5-325 MG per tablet   Commonly known as:  NORCO   Used for:  Post-op pain        Dose:  1-2 tablet   Take 1-2 tablets by mouth every 4 hours as needed for severe pain   Quantity:  20 tablet   Refills:  0         CONTINUE these medicines which have NOT CHANGED        Dose / Directions    atenolol 50 MG tablet   Commonly known as:  TENORMIN   Used for:  Hypertension goal BP (blood pressure) < 140/90        TAKE ONE TABLET (50 MG) BY MOUTH ONCE DAILY   Quantity:  90 tablet   Refills:  2       ibuprofen 200 MG tablet   Commonly known as:  ADVIL/MOTRIN        Dose:  200 mg   Take 200 mg by mouth every 4 hours as needed.   Refills:  0       loratadine 10 MG tablet   Commonly known as:  CLARITIN        Dose:  10 mg   Take 10 mg by mouth as needed   Refills:  0       order for DME   Used for:  TONY (obstructive sleep apnea)        Respironics Remstar 60 series auto 5-15 cm H2O, wisp s/m   Refills:  0       order for DME   Used for:  Ankle instability, unspecified laterality        Trilok Ankle Brace   Quantity:  2 Device   Refills:  0       PROBIOTIC DAILY PO        Refills:  0       TYLENOL 325 MG tablet   Generic drug:  acetaminophen        Dose:  1-2 tablet   Take 1-2 tablets by  mouth every 6 hours as needed.   Refills:  0            Where to get your medicines      Some of these will need a paper prescription and others can be bought over the counter. Ask your nurse if you have questions.     Bring a paper prescription for each of these medications     HYDROcodone-acetaminophen 5-325 MG per tablet                Protect others around you: Learn how to safely use, store and throw away your medicines at www.disposemymeds.org.        Information about OPIOIDS     PRESCRIPTION OPIOIDS: WHAT YOU NEED TO KNOW   We gave you an opioid (narcotic) pain medicine. It is important to manage your pain, but opioids are not always the best choice. You should first try all the other options your care team gave you. Take this medicine for as short a time (and as few doses) as possible.    Some activities can increase your pain, such as bandage changes or therapy sessions. It may help to take your pain medicine 30 to 60 minutes before these activities. Reduce your stress by getting enough sleep, working on hobbies you enjoy and practicing relaxation or meditation. Talk to your care team about ways to manage your pain beyond prescription opioids.    These medicines have risks:    DO NOT drive when on new or higher doses of pain medicine. These medicines can affect your alertness and reaction times, and you could be arrested for driving under the influence (DUI). If you need to use opioids long-term, talk to your care team about driving.    DO NOT operate heavy machinery    DO NOT do any other dangerous activities while taking these medicines.    DO NOT drink any alcohol while taking these medicines.     If the opioid prescribed includes acetaminophen, DO NOT take with any other medicines that contain acetaminophen. Read all labels carefully. Look for the word  acetaminophen  or  Tylenol.  Ask your pharmacist if you have questions or are unsure.    You can get addicted to pain medicines, especially if you have  a history of addiction (chemical, alcohol or substance dependence). Talk to your care team about ways to reduce this risk.    All opioids tend to cause constipation. Drink plenty of water and eat foods that have a lot of fiber, such as fruits, vegetables, prune juice, apple juice and high-fiber cereal. Take a laxative (Miralax, milk of magnesia, Colace, Senna) if you don t move your bowels at least every other day. Other side effects include upset stomach, sleepiness, dizziness, throwing up, tolerance (needing more of the medicine to have the same effect), physical dependence and slowed breathing.    Store your pills in a secure place, locked if possible. We will not replace any lost or stolen medicine. If you don t finish your medicine, please throw away (dispose) as directed by your pharmacist. The [place] Pollution [employer] Agency has more information about safe disposal: https://www.pca.[facility].us/living-green/managing-unwanted-medications             Medication List: This is a list of all your medications and when to take them. Check marks below indicate your daily home schedule. Keep this list as a reference.      Medications           Morning Afternoon Evening Bedtime As Needed    atenolol 50 MG tablet   Commonly known as:  TENORMIN   TAKE ONE TABLET (50 MG) BY MOUTH ONCE DAILY                                HYDROcodone-acetaminophen 5-325 MG per tablet   Commonly known as:  NORCO   Take 1-2 tablets by mouth every 4 hours as needed for severe pain                                ibuprofen 200 MG tablet   Commonly known as:  ADVIL/MOTRIN   Take 200 mg by mouth every 4 hours as needed.                                loratadine 10 MG tablet   Commonly known as:  CLARITIN   Take 10 mg by mouth as needed                                order for DME   Respironics Remstar 60 series auto 5-15 cm H2O, wisp s/m                                order for DME   Trilok Ankle Brace                                PROBIOTIC  DAILY PO                                TYLENOL 325 MG tablet   Take 1-2 tablets by mouth every 6 hours as needed.   Generic drug:  acetaminophen

## 2018-09-12 NOTE — H&P (VIEW-ONLY)
[facility]  [address] [address]  [phone]  Dept: [phone]    PRE-OP EVALUATION:  Today's date: 2018    Alia Jauregui (: 1975) presents for pre-operative evaluation assessment as requested by Dr. Pickering.  She requires evaluation and anesthesia risk assessment prior to undergoing surgery/procedure for treatment of Gallbladder dyskinesia .    Primary Physician: Tariq Paige  Type of Anesthesia Anticipated: General    Patient has a Health Care Directive or Living Will:  NO    Preop Questions 2014   Who is doing your surgery? Dr. Pickering   What are you having done? Cholecystectomy    Date of Surgery/Procedure: 18   Facility or Hospital where procedure/surgery will be performed: [facility]   1.  Do you have a history of Heart attack, stroke, stent, coronary bypass surgery, or other heart surgery? No   2.  Do you ever have any pain or discomfort in your chest? No   3.  Do you have a history of  Heart Failure? No   4.   Are you troubled by shortness of breath when:  walking on a level surface, or up a slight hill, or at night? No   5.  Do you currently have a cold, bronchitis or other respiratory infection? No   6.  Do you have a cough, shortness of breath, or wheezing? No   7.  Do you sometimes get pains in the calves of your legs when you walk? No   8. Do you or anyone in your family have previous history of blood clots? No   9.  Do you or does anyone in your family have a serious bleeding problem such as prolonged bleeding following surgeries or cuts? No   10. Have you ever had problems with anemia or been told to take iron pills? No   11. Have you had any abnormal blood loss such as black, tarry or bloody stools, or abnormal vaginal bleeding? No    12. Have you ever had a blood transfusion? No   13. Have you or any of your relatives ever had problems with anesthesia? YES - slow to wake up in her mother and daughter, and maternal  grandmother.  She personally has had nausea after surgery   14. Do you have sleep apnea, excessive snoring or daytime drowsiness? YES - mild TONY, has CPAP   15. Do you have any prosthetic heart valves? No   16. Do you have prosthetic joints? No   17. Is there any chance that you may be pregnant? No         HPI:     HPI related to upcoming procedure: I saw Alia on August 10 for right upper quadrant pain and ultimately ended up doing a HIDA scan that was very abnormal with severely reduced gallbladder ejection fraction, so I recommended consult with surgery, and they agreed that cholecystectomy would be reasonable to see if that will help out with her symptoms.      See problem list for active medical problems.  Problems all longstanding and stable, except as noted/documented.  See ROS for pertinent symptoms related to these conditions.                                                                                                                                                          .    MEDICAL HISTORY:     Patient Active Problem List    Diagnosis Date Noted     S/P hysterectomy 12/24/2014     Priority: Medium     Ovaries in       TONY (obstructive sleep apnea) 03/06/2014     Priority: Medium     Moderate TONY (2/27/2014 with AHI 18.8, shanita desat 89%)       Lactose intolerance 03/11/2011     Priority: Medium     CARDIOVASCULAR SCREENING; LDL GOAL LESS THAN 160 10/31/2010     Priority: Medium     Hypertension goal BP (blood pressure) < 140/90 09/08/2007     Priority: Medium     excellent control of bp - with some minimal LH - continue med, expect LH to improve.- already has. change from 25 mg tart bid to succ 25 mg daily.       Polycystic ovaries 07/31/2007     Priority: Medium      Past Medical History:   Diagnosis Date     Gestational diabetes 2008     Incompetence of cervix 7/25/2008 7/22/08-cerclage placed, Dr. Quezada following pt with mfm      Preeclampsia      Past Surgical History:   Procedure  Laterality Date      SECTION  2005    @ 24 weeks      SECTION      @ 29 weeks     D & C  3/4/08     LAPAROSCOPIC HYSTERECTOMY TOTAL, BILATERAL SALPINGO-OOPHORECTOMY, COMBINED N/A 2014    tlh, bilateral salpingectomy     Current Outpatient Prescriptions   Medication Sig Dispense Refill     acetaminophen (TYLENOL) 325 MG tablet Take 1-2 tablets by mouth every 6 hours as needed.       atenolol (TENORMIN) 50 MG tablet TAKE ONE TABLET (50 MG) BY MOUTH ONCE DAILY 90 tablet 2     ibuprofen (ADVIL,MOTRIN) 200 MG tablet Take 200 mg by mouth every 4 hours as needed.       loratadine (CLARITIN) 10 MG tablet Take 10 mg by mouth as needed        order for DME Trilok Ankle Brace 2 Device 0     ORDER FOR DME Respironics Remstar 60 series auto 5-15 cm H2O, wisp s/m       Probiotic Product (PROBIOTIC DAILY PO)        OTC products: None, except as noted above    Allergies   Allergen Reactions     Nkda [No Known Drug Allergies]       Latex Allergy: NO    Social History   Substance Use Topics     Smoking status: Never Smoker     Smokeless tobacco: Never Used     Alcohol use Yes      Comment: occassional      History   Drug Use No       REVIEW OF SYSTEMS:   Constitutional, neuro, ENT, endocrine, pulmonary, cardiac, gastrointestinal, genitourinary, musculoskeletal, integument and psychiatric systems are negative, except as otherwise noted plus some headache and nausea.    EXAM:   /86 (BP Location: Right arm, Patient Position: Chair, Cuff Size: Adult Regular)  Pulse 78  Temp 99  F (37.2  C) (Tympanic)  Resp 14  Wt 181 lb 3.2 oz (82.2 kg)  LMP 12/10/2014  SpO2 98%  BMI 29.69 kg/m2      GENERAL APPEARANCE: healthy, alert and no distress     HENT: normal ear canals and TMs, nose and mouth without ulcers or lesions     NECK: no adenopathy, no asymmetry, masses, or scars     RESP: lungs clear to auscultation - no rales, rhonchi or wheezes     CV: regular rates and rhythm, normal S1 S2, no S3 or S4 and  no murmur, click or rub -     ABDOMEN:  soft, RUQ pain, no HSM or masses and bowel sounds normal     MS: extremities normal- no gross deformities noted, no evidence of inflammation in joints       NEURO: mentation intact and speech normal     PSYCH: mentation appears normal. and affect normal/bright     LYMPHATICS: No cervical or supraclavicular nodes     DIAGNOSTICS:   EKG: appears normal, NSR, normal axis, normal intervals, no acute ST/T changes c/w ischemia, no LVH by voltage criteria    Recent Labs   Lab Test  06/20/18   1637  12/25/14   0635  11/03/14   1054   HGB  15.1  12.4  14.9   PLT  220   --   200   NA  138  140   --    POTASSIUM  3.7  4.3   --    CR  0.79  0.79   --         IMPRESSION:   Reason for surgery/procedure: Cholecystectomy for biliary dyskinesia  Diagnosis/reason for consult: Preop evaluation    The proposed surgical procedure is considered INTERMEDIATE risk.    REVISED CARDIAC RISK INDEX  The patient has the following serious cardiovascular risks for perioperative complications such as (MI, PE, VFib and 3  AV Block):  No serious cardiac risks  INTERPRETATION: 0 risks: Class I (very low risk - 0.4% complication rate)    The patient has the following additional risks for perioperative complications:  No identified additional risks      ICD-10-CM    1. Preop general physical exam Z01.818 EKG 12-lead complete w/read - Clinics   2. Dyskinesia of gallbladder K82.8    3. TONY (obstructive sleep apnea) G47.33    4. Hypertension goal BP (blood pressure) < 140/90 I10        RECOMMENDATIONS:       Cardiovascular Risk  Performs 4 METs exercise without symptoms (Climb a flight of stairs) .   EKG was done due to her history of hypertension and this was normal.  No further cardiac workup needed  Continue beta blocker for HTN      Obstructive Sleep Apnea (or suspected sleep apnea)  Patient is should continue to use their home CPAP when released from surgery      --Patient is to take all scheduled medications  on the day of surgery EXCEPT for modifications listed below.    Anticoagulant or Antiplatelet Medication Use  NSAIDS: Ibuprofen (Motrin):         Stop one day prior to surgery        APPROVAL GIVEN to proceed with proposed procedure, without further diagnostic evaluation       Signed Electronically by: Tariq Paige MD    Copy of this evaluation report is provided to requesting physician.    [facility] Preop Guidelines    Revised Cardiac Risk Index

## 2018-09-12 NOTE — ANESTHESIA CARE TRANSFER NOTE
Patient: Alia Jauregui    Procedure(s):  Laparoscopic CHOLECYSTECTOMY with Possible CHOLANGIOGRAMS,with Indocyanine injection - Wound Class: II-Clean Contaminated    Diagnosis: biliary dyskinesia  Diagnosis Additional Information: No value filed.    Anesthesia Type:   General, ETT     Note:  Airway :Nasal Cannula  Patient transferred to:PACU  Handoff Report: Identifed the Patient, Identified the Reponsible Provider, Reviewed the pertinent medical history, Discussed the surgical course, Reviewed Intra-OP anesthesia mangement and issues during anesthesia, Set expectations for post-procedure period and Allowed opportunity for questions and acknowledgement of understanding      Vitals: (Last set prior to Anesthesia Care Transfer)    CRNA VITALS  9/12/2018 0941 - 9/12/2018 1015      9/12/2018             Pulse: 63    SpO2: (!)  89 %    Resp Rate (observed): (!)  5                Electronically Signed By: RIO Butler CRNA  September 12, 2018  10:15 AM

## 2018-09-12 NOTE — PROGRESS NOTES
Pt unable to maintain O2 above 90% on RA, placed NC on pt at 2L and she is resting quietly in recliner.  No c/o pain or discomfort.

## 2018-09-12 NOTE — DISCHARGE INSTRUCTIONS
Wash incisions daily with soap and water. Some mild redness or swelling is expected. If draining, cover with dry gauze.    No lifting restrictions.  You may lift as comfort permits..    Okay to use ice pack over wound as necessary for comfort.    Use pain medication as necessary but avoid constipating side effects. Ibuprofen okay but avoid Tylenol as your pain medication already contains this.    Diet as tolerated. No restrictions.    Follow up with Dr Pickering in 1-2 weeks.    You may return to work when you feel up to it. Most patients take 1-2 weeks off. You may return sooner as pain allows. During your follow-up appointment, Dr. Pickering will give you a formal letter for your work with any restrictions detailed if needed.                           Same Day Surgery Discharge Instructions  Special Precautions After Surgery - Adult    1. It is not unusual to feel lightheaded or faint, up to 24 hours after surgery or while taking pain medication.  If you have these symptoms; sit for a few minutes before standing and have someone assist you when getting up.  2. You should rest and relax for the next 24 hours and must have someone stay with you for at least 24 hours after your discharge.  3. DO NOT DRIVE any vehicle or operate mechanical equipment for 24 hours following the end of your surgery.  DO NOT DRIVE while taking narcotic pain medications that have been prescribed by your physician.  If you had a limb operated on, you must be able to use it fully to drive.  4. DO NOT drink alcoholic beverages for 24 hours following surgery or while taking prescription pain medication.  5. Drink clear liquids (apple juice, ginger ale, broth, 7-Up, etc.).  Progress to your regular diet as you feel able.  6. Any questions call your physician and do not make important decisions for 24 hours.    ACTIVITY  ? Rest today.  No activity or diet restrictions.  ? Resume activity as tolerated.  ? Restrictions: per Dr Pickering instructions  ? See  printed discharge sheet.     INCISIONAL CARE  ? May  shower after 24 hours.  ? Apply ice 1/2 hour on and 1/2 hour off while awake.  ? Be alert for signs of infection:  redness, swelling, heat, drainage of pus, and/or elevated temperature.  Contact your doctor if these occur.        Call for an appointment to return to the clinic in 7-14 days.    Medications:  ? Hydrocodone (Norco, Vicodin):  Next dose: as needed.  ? Ibuprofen (Motrin, Advil):  Next dose: as needed but not before 5 pm.  ? Stool softeners to prevent constipation   ? Follow the instructions on the bottle.     Additional discharge instructions: Discharge instructions for Patient with Scopolamine Transdermal Patch    1.  You may leave the patch on behind your ear for three days-But NO LONGER.  May have withdrawal symptoms (nausea, vomiting, headache,dizziness) if used longer.  2.  When you remove the patch, you must wash and dry your hands thoroughly and before touching your eyes, as pupils may dilate.  3.  Discard patch (away from children and pets).  4.  May develop some urinary hesitancy or urine retention.  5.  Patch should be removed by: 9-15-18    __________________________________________________________________________________________________________________________________  IMPORTANT NUMBERS:    [facility] Main Number:  [phone], [phone]  Pharmacy:  [phone]  Same Day Surgery:  [phone], Monday - Friday until 8:30 p.m.  Urgent Care:  [phone]  Emergency Room:  [phone]      Surgery Specialty Clinic:  [phone]

## 2018-09-12 NOTE — OR NURSING
Pt instructed on use of incentive spirometer and  Guidelines for use. Pt with Tv of 2000 and a brisk cough after

## 2018-09-12 NOTE — IP AVS SNAPSHOT
[facility] PreOP/Phase II    [address] [address]    Phone:  [phone]    Fax:  [phone]                                       After Visit Summary   9/12/2018    Alia Jauregui    MRN: [id number]           After Visit Summary Signature Page     I have received my discharge instructions, and my questions have been answered. I have discussed any challenges I see with this plan with the nurse or doctor.    ..........................................................................................................................................  Patient/Patient Representative Signature      ..........................................................................................................................................  Patient Representative Print Name and Relationship to Patient    ..................................................               ................................................  Date                                   Time    ..........................................................................................................................................  Reviewed by Signature/Title    ...................................................              ..............................................  Date                                               Time          22EPIC Rev 08/18

## 2018-09-12 NOTE — BRIEF OP NOTE
[facility]   Brief Operative Note    Pre-operative diagnosis: biliary dyskinesia   Post-operative diagnosis biliary dyskinesia     Procedure: Procedure(s):  Laparoscopic CHOLECYSTECTOMY with Possible CHOLANGIOGRAMS,with Indocyanine injection - Wound Class: II-Clean Contaminated   Surgeon(s): Surgeon(s) and Role:     * Baltazar Pickering MD - Primary     * Giuseppe Dean PA-C - Assisting   Estimated blood loss: * No values recorded between 9/12/2018  9:10 AM and 9/12/2018  9:46 AM *    Specimens:   ID Type Source Tests Collected by Time Destination   A : Gallbladder and Contents Organ Gallbladder and Contents SURGICAL PATHOLOGY EXAM Baltazar Pickering MD 9/12/2018  9:30 AM       Findings: 1. Anatomy positively identified.  2. EBL 5 ml  3. No stones seen (as expected)

## 2018-09-17 LAB — COPATH REPORT: NORMAL

## 2018-09-24 ENCOUNTER — OFFICE VISIT (OUTPATIENT)
Dept: SURGERY | Facility: CLINIC | Age: 43
End: 2018-09-24
Payer: COMMERCIAL

## 2018-09-24 VITALS
HEIGHT: 66 IN | HEART RATE: 69 BPM | TEMPERATURE: 97.8 F | SYSTOLIC BLOOD PRESSURE: 132 MMHG | WEIGHT: 181 LBS | BODY MASS INDEX: 29.09 KG/M2 | DIASTOLIC BLOOD PRESSURE: 89 MMHG

## 2018-09-24 DIAGNOSIS — Z98.890 POSTOPERATIVE STATE: Primary | ICD-10-CM

## 2018-09-24 PROCEDURE — 99024 POSTOP FOLLOW-UP VISIT: CPT | Performed by: SURGERY

## 2018-09-24 ASSESSMENT — PAIN SCALES - GENERAL: PAINLEVEL: MILD PAIN (2)

## 2018-09-24 NOTE — LETTER
[facility]  [address] [address]  [phone]          September 24, 2018    RE:  Alia Jauregui                                                                                                                                                       [address] [place] [address]            To whom it may concern:    Alia Jauregui is under my professional care for post-op care.  She  may return to work without restrictions as of Monday October 1st.        Sincerely,        Baltazar Pickering MD FACS

## 2018-09-24 NOTE — LETTER
9/24/2018         RE: Alia Jauregui  [address] [place] [id number]        Dear Colleague,    Thank you for referring your patient, Alia Jauregui, to [facility]. Please see a copy of my visit note below.    Patient returns 12 days postop from a laparoscopic cholecystectomy. This was done for biliary dyskinesia. She has significant symptoms pretty much all the time. Her ejection fraction was 1%.    Postoperatively, she has done well. Her preop symptoms are gone. She is recovering nicely and has no particular concerns.    Pathology results were reviewed with her. They were negative. There were no abnormalities detected in her gallbladder.    On exam: Her wounds are healing nicely. No sign of infection or herniation.    Impression: Satisfactory postoperative course    Condition: Returned usual activities as tolerated. I sent her home with a note so that she can return to work as of Monday without restrictions. That will be 2-1/2 weeks from surgery, which should be an adequate amount of time. I told her to call the office if there is any problems, otherwise I will see her back as needed.    Batlazar Pickering MD FACS    Again, thank you for allowing me to participate in the care of your patient.        Sincerely,        Baltazar Pickering MD

## 2018-09-24 NOTE — NURSING NOTE
"Initial /89 (BP Location: Right arm, Patient Position: Sitting, Cuff Size: Adult Regular)  Pulse 69  Temp 97.8  F (36.6  C) (Oral)  Ht 1.664 m (5' 5.5\")  Wt 82.1 kg (181 lb)  LMP 12/10/2014  BMI 29.66 kg/m2 Estimated body mass index is 29.66 kg/(m^2) as calculated from the following:    Height as of this encounter: 1.664 m (5' 5.5\").    Weight as of this encounter: 82.1 kg (181 lb). .    Tana Leroy CMA    "

## 2018-09-24 NOTE — PROGRESS NOTES
Patient returns 12 days postop from a laparoscopic cholecystectomy. This was done for biliary dyskinesia. She has significant symptoms pretty much all the time. Her ejection fraction was 1%.    Postoperatively, she has done well. Her preop symptoms are gone. She is recovering nicely and has no particular concerns.    Pathology results were reviewed with her. They were negative. There were no abnormalities detected in her gallbladder.    On exam: Her wounds are healing nicely. No sign of infection or herniation.    Impression: Satisfactory postoperative course    Condition: Returned usual activities as tolerated. I sent her home with a note so that she can return to work as of Monday without restrictions. That will be 2-1/2 weeks from surgery, which should be an adequate amount of time. I told her to call the office if there is any problems, otherwise I will see her back as needed.    Baltazar Pickering MD FACS

## 2018-09-24 NOTE — MR AVS SNAPSHOT
"              After Visit Summary   9/24/2018    Alia Jauregui    MRN: [id number]           Patient Information     Date Of Birth          1975        Visit Information        Provider Department      9/24/2018 9:00 AM Baltazar Pickering MD [facility]        Today's Diagnoses     Postoperative state    -  1      Care Instructions    Per Physician's instructions            Follow-ups after your visit        Who to contact     If you have questions or need follow up information about today's clinic visit or your schedule please contact [facility] directly at [phone].  Normal or non-critical lab and imaging results will be communicated to you by [employer], letter or phone within 4 business days after the clinic has received the results. If you do not hear from us within 7 days, please contact the clinic through [employer] or phone. If you have a critical or abnormal lab result, we will notify you by phone as soon as possible.  Submit refill requests through [email or web address] or call your pharmacy and they will forward the refill request to us. Please allow 3 business days for your refill to be completed.          Additional Information About Your Visit        MyChart Information     [email or web address] gives you secure access to your electronic health record. If you see a primary care provider, you can also send messages to your care team and make appointments. If you have questions, please call your primary care clinic.  If you do not have a primary care provider, please call [phone] and they will assist you.        Care EveryWhere ID     This is your Care EveryWhere ID. This could be used by other organizations to access your [facility] medical records  [id number]        Your Vitals Were     Pulse Temperature Height Last Period BMI (Body Mass Index)       69 97.8  F (36.6  C) (Oral) 1.664 m (5' 5.5\") 12/10/2014 29.66 kg/m2        Blood Pressure from Last 3 Encounters:   09/24/18 132/89   09/12/18 " 120/68   09/07/18 122/86    Weight from Last 3 Encounters:   09/24/18 82.1 kg (181 lb)   09/12/18 82.1 kg (181 lb)   09/07/18 82.2 kg (181 lb 3.2 oz)              Today, you had the following     No orders found for display       Primary Care Provider Office Phone # Fax #    KayleyBelia Paige [phone] [address] [address]        Equal Access to Services     FELIPE WU : Hadii ruchi ku hadasho Soomaali, waaxda luqadaha, qaybta kaalmada adeegyada, waxay radhain hayadarshn kit [place] . So [facility] [phone].    ATENCIÓN: Si habla español, tiene a wilson disposición servicios gratuitos de asistencia lingüística. [facility] [phone].    We comply with applicable federal civil rights laws and [place] laws. We do not discriminate on the basis of race, color, national origin, age, disability, sex, sexual orientation, or gender identity.            Thank you!     Thank you for choosing [facility]  for your care. Our goal is always to provide you with excellent care. Hearing back from our patients is one way we can continue to improve our services. Please take a few minutes to complete the written survey that you may receive in the mail after your visit with us. Thank you!             Your Updated Medication List - Protect others around you: Learn how to safely use, store and throw away your medicines at www.disposemymeds.org.          This list is accurate as of 9/24/18  9:22 AM.  Always use your most recent med list.                   Brand Name Dispense Instructions for use Diagnosis    atenolol 50 MG tablet    TENORMIN    90 tablet    TAKE ONE TABLET (50 MG) BY MOUTH ONCE DAILY    Hypertension goal BP (blood pressure) < 140/90       HYDROcodone-acetaminophen 5-325 MG per tablet    NORCO    20 tablet    Take 1-2 tablets by mouth every 4 hours as needed for severe pain    Post-op pain       ibuprofen 200 MG tablet    ADVIL/MOTRIN     Take 200 mg by mouth every 4 hours  as needed.        loratadine 10 MG tablet    CLARITIN     Take 10 mg by mouth as needed        order for DME      Respironics Remstar 60 series auto 5-15 cm H2O, wisp s/m    TONY (obstructive sleep apnea)       order for DME     2 Device    Trilok Ankle Brace    Ankle instability, unspecified laterality       PROBIOTIC DAILY PO           TYLENOL 325 MG tablet   Generic drug:  acetaminophen      Take 1-2 tablets by mouth every 6 hours as needed.

## 2018-11-09 ENCOUNTER — TELEPHONE (OUTPATIENT)
Dept: FAMILY MEDICINE | Facility: CLINIC | Age: 43
End: 2018-11-09

## 2018-11-09 NOTE — TELEPHONE ENCOUNTER
Asked by [facility] to triage Dr. Paige's 11/12/18 4:40 pm appointment: Pain in lower right abdomen worse after movement.    Left message for patient to return call to clinic.     Catherine Alonzo RN

## 2018-11-09 NOTE — TELEPHONE ENCOUNTER
S-(situation): abdominal pain    B-(background): LOV 09/07/18    A-(assessment): Patient states she is currently on vacation in [place]. Has been experiencing intermittent lower right abdominal pain. At time of call-states feeling better. Has been feeling nauseated, radiating to right side of back at times. Taking Tylenol and ibuprofen-with relief.     She states that kidney stone were present on previous imaging studies.   Entered by Tariq Paige MD at 6/25/2018  7:23 AM   Read by Alia Jauregui at 6/25/2018  4:30 PM   Alia- your CT scan does not show any concerning causes for your abdominal pain.  It shows some kidney stones, but they are not blocking anything, so should not be causing pain currently.  If these move in the future, they could cause pain which would typically be pretty strong pain in one flank that comes and goes in waves.  If you develop a pain like that in the future, I'd recommend getting it checked out.       Has had recent cholecystectomy. Appendix is intact. Has history of PCOS.     Denies: weakness, fever, lightheadedness, vomiting, bloody/black stools    R-(recommendations): Advised to seek medical care per Telephone Triage Protocols for Nurses 5th Edition, Juan Jose.  Patient states she will be flying back to [place] this evening. Advised to seek medical care as soon as possible. Patient verbalizes understanding and agreement.     Catherine ROUSSEAU RN

## 2018-11-10 ENCOUNTER — APPOINTMENT (OUTPATIENT)
Dept: CT IMAGING | Facility: CLINIC | Age: 43
End: 2018-11-10
Attending: EMERGENCY MEDICINE
Payer: COMMERCIAL

## 2018-11-10 ENCOUNTER — HOSPITAL ENCOUNTER (EMERGENCY)
Facility: CLINIC | Age: 43
Discharge: HOME | End: 2018-11-10
Attending: FAMILY MEDICINE | Admitting: FAMILY MEDICINE
Payer: COMMERCIAL

## 2018-11-10 VITALS
TEMPERATURE: 98 F | HEART RATE: 78 BPM | BODY MASS INDEX: 29.5 KG/M2 | RESPIRATION RATE: 18 BRPM | OXYGEN SATURATION: 100 % | SYSTOLIC BLOOD PRESSURE: 126 MMHG | WEIGHT: 180 LBS | DIASTOLIC BLOOD PRESSURE: 72 MMHG

## 2018-11-10 DIAGNOSIS — N20.1 URETEROLITHIASIS: ICD-10-CM

## 2018-11-10 LAB
ALBUMIN SERPL-MCNC: 3.6 G/DL (ref 3.4–5)
ALBUMIN UR-MCNC: NEGATIVE MG/DL
ALP SERPL-CCNC: 55 U/L (ref 40–150)
ALT SERPL W P-5'-P-CCNC: 35 U/L (ref 0–50)
ANION GAP SERPL CALCULATED.3IONS-SCNC: 8 MMOL/L (ref 3–14)
APPEARANCE UR: CLEAR
AST SERPL W P-5'-P-CCNC: 31 U/L (ref 0–45)
BACTERIA #/AREA URNS HPF: ABNORMAL /HPF
BASOPHILS # BLD AUTO: 0.1 10E9/L (ref 0–0.2)
BASOPHILS NFR BLD AUTO: 0.7 %
BILIRUB SERPL-MCNC: 1.6 MG/DL (ref 0.2–1.3)
BILIRUB UR QL STRIP: NEGATIVE
BUN SERPL-MCNC: 14 MG/DL (ref 7–30)
CALCIUM SERPL-MCNC: 8.5 MG/DL (ref 8.5–10.1)
CHLORIDE SERPL-SCNC: 107 MMOL/L (ref 94–109)
CO2 SERPL-SCNC: 24 MMOL/L (ref 20–32)
COLOR UR AUTO: YELLOW
CREAT SERPL-MCNC: 0.71 MG/DL (ref 0.52–1.04)
DIFFERENTIAL METHOD BLD: NORMAL
EOSINOPHIL # BLD AUTO: 0.2 10E9/L (ref 0–0.7)
EOSINOPHIL NFR BLD AUTO: 2.7 %
ERYTHROCYTE [DISTWIDTH] IN BLOOD BY AUTOMATED COUNT: 12.1 % (ref 10–15)
GFR SERPL CREATININE-BSD FRML MDRD: >90 ML/MIN/1.7M2
GLUCOSE SERPL-MCNC: 113 MG/DL (ref 70–99)
GLUCOSE UR STRIP-MCNC: NEGATIVE MG/DL
HCT VFR BLD AUTO: 41.7 % (ref 35–47)
HGB BLD-MCNC: 14.7 G/DL (ref 11.7–15.7)
HGB UR QL STRIP: NEGATIVE
IMM GRANULOCYTES # BLD: 0.1 10E9/L (ref 0–0.4)
IMM GRANULOCYTES NFR BLD: 0.6 %
KETONES UR STRIP-MCNC: NEGATIVE MG/DL
LEUKOCYTE ESTERASE UR QL STRIP: ABNORMAL
LYMPHOCYTES # BLD AUTO: 3.1 10E9/L (ref 0.8–5.3)
LYMPHOCYTES NFR BLD AUTO: 35.2 %
MCH RBC QN AUTO: 32.7 PG (ref 26.5–33)
MCHC RBC AUTO-ENTMCNC: 35.3 G/DL (ref 31.5–36.5)
MCV RBC AUTO: 93 FL (ref 78–100)
MONOCYTES # BLD AUTO: 0.5 10E9/L (ref 0–1.3)
MONOCYTES NFR BLD AUTO: 5.5 %
MUCOUS THREADS #/AREA URNS LPF: PRESENT /LPF
NEUTROPHILS # BLD AUTO: 4.9 10E9/L (ref 1.6–8.3)
NEUTROPHILS NFR BLD AUTO: 55.3 %
NITRATE UR QL: NEGATIVE
NRBC # BLD AUTO: 0 10*3/UL
NRBC BLD AUTO-RTO: 0 /100
PH UR STRIP: 5 PH (ref 5–7)
PLATELET # BLD AUTO: 216 10E9/L (ref 150–450)
POTASSIUM SERPL-SCNC: 4 MMOL/L (ref 3.4–5.3)
PROT SERPL-MCNC: 7.8 G/DL (ref 6.8–8.8)
RBC # BLD AUTO: 4.49 10E12/L (ref 3.8–5.2)
RBC #/AREA URNS AUTO: 7 /HPF (ref 0–2)
SODIUM SERPL-SCNC: 139 MMOL/L (ref 133–144)
SOURCE: ABNORMAL
SP GR UR STRIP: 1.02 (ref 1–1.03)
SQUAMOUS #/AREA URNS AUTO: 1 /HPF (ref 0–1)
UROBILINOGEN UR STRIP-MCNC: 0 MG/DL (ref 0–2)
WBC # BLD AUTO: 8.9 10E9/L (ref 4–11)
WBC #/AREA URNS AUTO: 49 /HPF (ref 0–5)

## 2018-11-10 PROCEDURE — 81001 URINALYSIS AUTO W/SCOPE: CPT | Performed by: EMERGENCY MEDICINE

## 2018-11-10 PROCEDURE — 85025 COMPLETE CBC W/AUTO DIFF WBC: CPT | Performed by: EMERGENCY MEDICINE

## 2018-11-10 PROCEDURE — 80053 COMPREHEN METABOLIC PANEL: CPT | Performed by: EMERGENCY MEDICINE

## 2018-11-10 PROCEDURE — 74176 CT ABD & PELVIS W/O CONTRAST: CPT

## 2018-11-10 PROCEDURE — 99284 EMERGENCY DEPT VISIT MOD MDM: CPT | Mod: 25

## 2018-11-10 PROCEDURE — 99285 EMERGENCY DEPT VISIT HI MDM: CPT | Mod: Z6 | Performed by: EMERGENCY MEDICINE

## 2018-11-10 PROCEDURE — 87086 URINE CULTURE/COLONY COUNT: CPT | Performed by: EMERGENCY MEDICINE

## 2018-11-10 RX ORDER — ACETAMINOPHEN 500 MG
1000 TABLET ORAL EVERY 6 HOURS PRN
COMMUNITY

## 2018-11-10 RX ORDER — ONDANSETRON 4 MG/1
4 TABLET, ORALLY DISINTEGRATING ORAL EVERY 6 HOURS PRN
Qty: 10 TABLET | Refills: 0 | Status: SHIPPED | OUTPATIENT
Start: 2018-11-10 | End: 2018-11-13

## 2018-11-10 RX ORDER — DOCUSATE SODIUM 100 MG/1
100 CAPSULE, LIQUID FILLED ORAL DAILY
COMMUNITY
End: 2020-09-15

## 2018-11-10 RX ORDER — OXYCODONE AND ACETAMINOPHEN 5; 325 MG/1; MG/1
1-2 TABLET ORAL EVERY 4 HOURS PRN
Qty: 12 TABLET | Refills: 0 | Status: ON HOLD | OUTPATIENT
Start: 2018-11-10 | End: 2018-11-15

## 2018-11-10 NOTE — ED AVS SNAPSHOT
[facility] Emergency Department    [address] [address]    Phone:  [phone]    Fax:  [phone]                                       Alia Jauregui   MRN: [id number]    Department:  [facility] Emergency Department   Date of Visit:  11/10/2018           Patient Information     Date Of Birth          1975        Your diagnoses for this visit were:     Ureterolithiasis        You were seen by Reji Hurtado MD and Cornelius Carrillo MD.        Discharge Instructions         Kidney Stone with Pain    The sharp cramping pain on either side of your lower back and nausea/vomiting that you have are because of a small stone that has formed in the kidney. It is now passing down a narrow tube (ureter) on its way to your bladder. Once the stone reaches your bladder, the pain will often stop. But it may come back as the stone continues to pass out of the bladder and through the urethra. The stone may pass in your urine stream in one piece. The size may be 1/16 inch to 1/4 inch (1 mm to 6 mm). Or, the stone may break up into luis fragments that you may not even notice.  Once you have had a kidney stone, you are at risk of getting another one in the future. There are 4 types of kidney stones. Eighty percent are calcium stones--mostly calcium oxalate but also some with calcium phosphate. The other 3 types include uric acid stones, struvite stones (from a preceding infection), and rarely, cystine stones.  Most stones will pass on their own, but may take from a few hours to a few days. Sometimes the stone is too large to pass by itself. In that case, the healthcare provider will need to use other ways to remove the stone. These techniques include:    Lithotripsy. This uses ultrasound waves to break up the stone.    Ureteroscopy. This pushes a basket-like instrument through the urethra and bladder and into the ureter to pull out the stone.    Various types of direct surgery through the  skin  Home care  The following are general care guidelines:    Drink plenty of fluids. This means at least 12, 8-ounce glasses of fluid--mostly water--a day.    Each time you urinate, do so in a jar. Pour the urine from the jar through the strainer and into the toilet. Continue doing this until 24 hours after your pain stops. By then, if there was a kidney stone, it should pass from your bladder. Some stones dissolve into sand-like particles and pass right through the strainer. In that case, you won t ever see a stone.    Save any stone that you find in the strainer and bring it to your healthcare provider to look at. It may be possible to stop certain types of stones from forming. For this reason, it is important to know what kind of stone you have.    Try to stay as active as possible. This will help the stone pass. Don't stay in bed unless your pain keeps you from getting up. You may notice a red, pink, or brown color to your urine. This is normal while passing a kidney stone.    If you develop pain, you may take ibuprofen or naproxen for pain, unless another medicine was prescribed. If you have chronic liver or kidney disease, talk with your healthcare provider before taking these medicines. Also talk with your provider if you've had a stomach ulcer or GI bleeding.  Preventing stones  Each year for the next 5 to 7 years, you are at risk that a new stone will form. Your risk is a 50% chance over this time period. The risk is higher if you have a family history of kidney stones or have certain chronic illnesses like hypertension, obesity, or diabetes. But you can make changes to your lifestyle and diet that can lower your risk for another stone.  Most kidney stones are made of calcium. The following is advice for preventing another calcium stone. If you don t know the type of stone you have, follow this advice until the cause of your stone is found.  Things that help:    The most important thing you can do is to  drink plenty of fluids each day. See home care above.     Eat foods that contain phytates. These include wheat, rice, rye, barley, and beans. Phytates are substances that may lower your risk for any type of stone to form.    Eat more fruits and vegetables. Choose those that are high in potassium.    Eat foods high in natural citrate like fruit and low-sugar fruit juices.    Having too little calcium in your diet can put you at risk for calcium kidney stones. Eat a normal amount of calcium in your diet and talk with your healthcare provider if you are taking calcium supplements. Cutting back on your calcium intake may raise your risk. New research shows that eating calcium-rich and oxalate-rich foods together lowers your risk for stones by binding the minerals in the stomach and intestines before they can reach the kidneys.      Limit salt intake to 2 grams (1 teaspoon) per day. Use limited amounts when cooking, and don t add salt at the table. Processed and canned foods are usually high in salt.     Spinach, rhubarb, peanuts, cashews, almonds, grapefruit, and grapefruit juice are all high oxalate foods. You should limit how much of these you eat. Or eat them with calcium-rich foods. These include dairy products, dark leafy greens, soy products, and calcium-enriched foods.    Reducing the amount of animal meat and high protein foods in your diet may lower your risk for uric acid stones.    Avoid excess sugar (sucrose) and fructose (sweetener in many soft drinks) in your diet.     If you take vitamin C as a supplement, don't take more than 1,000 mg a day.    A dietitian or your healthcare provider can give you information about changes in your diet that will help prevent more kidney stones from forming.  Follow-up care  Follow up with your healthcare provider, or as advised, if the pain lasts more than 48 hours. Talk with your provider about urine and blood tests to find out the cause of your stone. If you had an  X-ray, CT scan, or other diagnostic test, you will be told of any new findings that may affect your care.  Call 911  Call 911 if you have any of these:    Weakness, dizziness, or fainting  When to seek medical advice  Call your healthcare provider right away if any of these occur:    Pain that is not controlled by the medicine given    Repeated vomiting or unable to keep down fluids    Fever of 100.4 F (38 C) or higher, or as directed by your healthcare provider    Passage of solid red or brown urine (can't see through it) or urine with lots of blood clots    Foul-smelling or cloudy urine    Unable to pass urine for 8 hours and increasing bladder pressure  Date Last Reviewed: 10/1/2016    6251-5108 [employer]. [address] [place] [address], [place] [address]. All rights reserved. This information is not intended as a substitute for professional medical care. Always follow your healthcare professional's instructions.      Percocet for pain, Zofran for nausea,    First thing Monday call [phone], or [phone], or [phone], asked to speak to a nurse, let them know that you were in the emergency department on 11/10, that I spoke with Dr. Sellers urology [facility] on-call who recommends follow-up early this week.    Your next 10 appointments already scheduled     Nov 12, 2018  4:40 PM TYLER Negrete with Tariq Paige MD   [facility]    [address] [address] [phone]              24 Hour Appointment Hotline       To make an appointment at any [facility], call [phone] ([phone]). If you don't have a family doctor or clinic, we will help you find one. [facility] are conveniently located to serve the needs of you and your family.             Review of your medicines      START taking        Dose / Directions Last dose taken    ondansetron 4 MG ODT tab   Commonly known as:  ZOFRAN ODT   Dose:  4 mg   Quantity:   10 tablet        Take 1 tablet (4 mg) by mouth every 6 hours as needed for nausea   Refills:  0        oxyCODONE-acetaminophen 5-325 MG per tablet   Commonly known as:  PERCOCET   Dose:  1-2 tablet   Quantity:  12 tablet        Take 1-2 tablets by mouth every 4 hours as needed for pain   Refills:  0          Our records show that you are taking the medicines listed below. If these are incorrect, please call your family doctor or clinic.        Dose / Directions Last dose taken    acetaminophen 500 MG tablet   Commonly known as:  TYLENOL   Dose:  1000 mg        Take 1,000 mg by mouth every 6 hours as needed for mild pain   Refills:  0        atenolol 50 MG tablet   Commonly known as:  TENORMIN   Quantity:  90 tablet        TAKE ONE TABLET (50 MG) BY MOUTH ONCE DAILY   Refills:  2        docusate sodium 100 MG capsule   Commonly known as:  COLACE   Dose:  100 mg        Take 100 mg by mouth daily   Refills:  0        ibuprofen 200 MG tablet   Commonly known as:  ADVIL/MOTRIN   Dose:  400 mg        Take 400 mg by mouth every 6 hours as needed   Refills:  0        loratadine 10 MG tablet   Commonly known as:  CLARITIN   Dose:  10 mg        Take 10 mg by mouth daily as needed   Refills:  0        order for [facility]        Respironics Remstar 60 series auto 5-15 cm H2O, wisp s/m   Refills:  0                Information about OPIOIDS     PRESCRIPTION OPIOIDS: WHAT YOU NEED TO KNOW   We gave you an opioid (narcotic) pain medicine. It is important to manage your pain, but opioids are not always the best choice. You should first try all the other options your care team gave you. Take this medicine for as short a time (and as few doses) as possible.    Some activities can increase your pain, such as bandage changes or therapy sessions. It may help to take your pain medicine 30 to 60 minutes before these activities. Reduce your stress by getting enough sleep, working on hobbies you enjoy and practicing relaxation or meditation. Talk  to your care team about ways to manage your pain beyond prescription opioids.    These medicines have risks:    DO NOT drive when on new or higher doses of pain medicine. These medicines can affect your alertness and reaction times, and you could be arrested for driving under the influence (DUI). If you need to use opioids long-term, talk to your care team about driving.    DO NOT operate heavy machinery    DO NOT do any other dangerous activities while taking these medicines.    DO NOT drink any alcohol while taking these medicines.     If the opioid prescribed includes acetaminophen, DO NOT take with any other medicines that contain acetaminophen. Read all labels carefully. Look for the word  acetaminophen  or  Tylenol.  Ask your pharmacist if you have questions or are unsure.    You can get addicted to pain medicines, especially if you have a history of addiction (chemical, alcohol or substance dependence). Talk to your care team about ways to reduce this risk.    All opioids tend to cause constipation. Drink plenty of water and eat foods that have a lot of fiber, such as fruits, vegetables, prune juice, apple juice and high-fiber cereal. Take a laxative (Miralax, milk of magnesia, Colace, Senna) if you don t move your bowels at least every other day. Other side effects include upset stomach, sleepiness, dizziness, throwing up, tolerance (needing more of the medicine to have the same effect), physical dependence and slowed breathing.    Store your pills in a secure place, locked if possible. We will not replace any lost or stolen medicine. If you don t finish your medicine, please throw away (dispose) as directed by your pharmacist. The [place] [employer] has more information about safe disposal: https://www.pca.[email or web address]/living-green/[email or web address]-unwanted-medications        Prescriptions were sent or printed at these locations (2 Prescriptions)                   [facility] [place] - [address]   [address] [place], [place] [address]    Telephone:  [phone]   Fax:  [phone]   Hours:                  Printed at Department/Unit printer (2 of 2)         ondansetron (ZOFRAN ODT) 4 MG ODT tab               oxyCODONE-acetaminophen (PERCOCET) 5-325 MG per tablet                Procedures and tests performed during your visit     Abd/pelvis CT - no contrast - Stone Protocol    CBC with platelets differential    Comprehensive metabolic panel    Peripheral IV catheter    UA reflex to Microscopic and Culture    Urine Culture Aerobic Bacterial      Orders Needing Specimen Collection     None      Pending Results     Date and Time Order Name Status Description    11/10/2018 1555 Urine Culture Aerobic Bacterial In process             Pending Culture Results     Date and Time Order Name Status Description    11/10/2018 1555 Urine Culture Aerobic Bacterial In process             Pending Results Instructions     If you had any lab results that were not finalized at the time of your Discharge, you can call the ED Lab Result RN at [phone]. You will be contacted by this team for any positive Lab results or changes in treatment. The nurses are available 7 days a week from 10A to 6:30P.  You can leave a message 24 hours per day and they will return your call.        Test Results From Your Hospital Stay        11/10/2018  2:25 PM      Component Results     Component Value Ref Range & Units Status    WBC 8.9 4.0 - 11.0 10e9/L Final    RBC Count 4.49 3.8 - 5.2 10e12/L Final    Hemoglobin 14.7 11.7 - 15.7 g/dL Final    Hematocrit 41.7 35.0 - 47.0 % Final    MCV 93 78 - 100 fl Final    MCH 32.7 26.5 - 33.0 pg Final    MCHC 35.3 31.5 - 36.5 g/dL Final    RDW 12.1 10.0 - 15.0 % Final    Platelet Count 216 150 - 450 10e9/L Final    Diff Method Automated Method  Final    % Neutrophils 55.3 % Final    % Lymphocytes 35.2 % Final    % Monocytes 5.5 % Final    % Eosinophils 2.7 % Final    % Basophils 0.7  % Final    % Immature Granulocytes 0.6 % Final    Nucleated RBCs 0 0 /100 Final    Absolute Neutrophil 4.9 1.6 - 8.3 10e9/L Final    Absolute Lymphocytes 3.1 0.8 - 5.3 10e9/L Final    Absolute Monocytes 0.5 0.0 - 1.3 10e9/L Final    Absolute Eosinophils 0.2 0.0 - 0.7 10e9/L Final    Absolute Basophils 0.1 0.0 - 0.2 10e9/L Final    Abs Immature Granulocytes 0.1 0 - 0.4 10e9/L Final    Absolute Nucleated RBC 0.0  Final         11/10/2018  2:46 PM      Component Results     Component Value Ref Range & Units Status    Sodium 139 133 - 144 mmol/L Final    Potassium 4.0 3.4 - 5.3 mmol/L Final    Chloride 107 94 - 109 mmol/L Final    Carbon Dioxide 24 20 - 32 mmol/L Final    Anion Gap 8 3 - 14 mmol/L Final    Glucose 113 (H) 70 - 99 mg/dL Final    Urea Nitrogen 14 7 - 30 mg/dL Final    Creatinine 0.71 0.52 - 1.04 mg/dL Final    GFR Estimate >90 >60 mL/min/1.7m2 Final    Non  GFR Calc    GFR Estimate If Black >90 >60 mL/min/1.7m2 Final    African American GFR Calc    Calcium 8.5 8.5 - 10.1 mg/dL Final    Bilirubin Total 1.6 (H) 0.2 - 1.3 mg/dL Final    Albumin 3.6 3.4 - 5.0 g/dL Final    Protein Total 7.8 6.8 - 8.8 g/dL Final    Alkaline Phosphatase 55 40 - 150 U/L Final    ALT 35 0 - 50 U/L Final    AST 31 0 - 45 U/L Final         11/10/2018  2:29 PM      Component Results     Component Value Ref Range & Units Status    Color Urine Yellow  Final    Appearance Urine Clear  Final    Glucose Urine Negative NEG^Negative mg/dL Final    Bilirubin Urine Negative NEG^Negative Final    Ketones Urine Negative NEG^Negative mg/dL Final    Specific Gravity Urine 1.016 1.003 - 1.035 Final    Blood Urine Negative NEG^Negative Final    pH Urine 5.0 5.0 - 7.0 pH Final    Protein Albumin Urine Negative NEG^Negative mg/dL Final    Urobilinogen mg/dL 0.0 0.0 - 2.0 mg/dL Final    Nitrite Urine Negative NEG^Negative Final    Leukocyte Esterase Urine Moderate (A) NEG^Negative Final    Source Midstream Urine  Final    RBC Urine 7  (H) 0 - 2 /HPF Final    WBC Urine 49 (H) 0 - 5 /HPF Final    Bacteria Urine Few (A) NEG^Negative /HPF Final    Squamous Epithelial /HPF Urine 1 0 - 1 /HPF Final    Mucous Urine Present (A) NEG^Negative /LPF Final         11/10/2018  3:52 PM      Narrative     CT ABDOMEN AND PELVIS WITHOUT CONTRAST   11/10/2018 2:38 PM     HISTORY: Right flank/right lower quadrant abdominal pain.     TECHNIQUE:   No IV contrast material. Radiation dose for this scan was  reduced using automated exposure control, adjustment of the mA and/or  kV according to patient size, or iterative reconstruction technique.    COMPARISON: 6/22/2018    FINDINGS:   The lung bases are clear. There is diffuse fatty  infiltration of the liver. Postoperative changes of cholecystectomy.  Within limits of noncontrast technique, no acute abnormality is seen  in the spleen, pancreas, or adrenal glands.    There are two stones in the right renal pelvis. The largest of these  measures 1.2 cm. There is mild right hydronephrosis and mild  perinephric stranding around the right renal pelvis. There are  additional nonobstructing stones in both kidneys. There is no left  hydronephrosis.    There is a 3 cm cyst in the left adnexa, likely an ovarian cyst. The  uterus is absent. The bowel is normal in caliber without evidence of  obstruction. The appendix is normal.        Impression     IMPRESSION:   1. Two stones in the right renal pelvis causing mild hydronephrosis  and perinephric stranding.  2. Bilateral nephrolithiasis.  3. Fatty infiltration of the liver.  4. A left adnexal cyst, likely an ovarian cyst.    JEFF STEVENSON MD         11/10/2018  4:01 PM                Thank you for choosing [facility]       Thank you for choosing [facility] for your care. Our goal is always to provide you with excellent care. Hearing back from our patients is one way we can continue to improve our services. Please take a few minutes to complete the written survey that you may receive  in the mail after you visit with us. Thank you!        [facility] Information     [facility] gives you secure access to your electronic health record. If you see a primary care provider, you can also send messages to your care team and make appointments. If you have questions, please call your primary care clinic.  If you do not have a primary care provider, please call [phone] and they will assist you.        Care EveryWhere ID     This is your Care EveryWhere ID. This could be used by other organizations to access your [facility] medical records  [id number]        Equal Access to Services     [facility] : Ramona Parker, mere marr, jessenia lamas, petey copeland . So [facility] [phone].    ATENCIÓN: Si habla español, tiene a wilson disposición servicios gratuitos de asistencia lingüística. Llame al [phone].    We comply with applicable federal civil rights laws and [place] laws. We do not discriminate on the basis of race, color, national origin, age, disability, sex, sexual orientation, or gender identity.            After Visit Summary       This is your record. Keep this with you and show to your community pharmacist(s) and doctor(s) at your next visit.

## 2018-11-10 NOTE — ED AVS SNAPSHOT
[facility] Emergency Department    [address] [address]    Phone:  [phone]    Fax:  [phone]                                       Alia Jauregui   MRN: [id number]    Department:  [facility] Emergency Department   Date of Visit:  11/10/2018           After Visit Summary Signature Page     I have received my discharge instructions, and my questions have been answered. I have discussed any challenges I see with this plan with the nurse or doctor.    ..........................................................................................................................................  Patient/Patient Representative Signature      ..........................................................................................................................................  Patient Representative Print Name and Relationship to Patient    ..................................................               ................................................  Date                                   Time    ..........................................................................................................................................  Reviewed by Signature/Title    ...................................................              ..............................................  Date                                               Time          22EPIC Rev 08/18

## 2018-11-10 NOTE — ED PROVIDER NOTES
History     Chief Complaint   Patient presents with     Abdominal Pain     RLQ     HPI  Alia Jauregui is a 43 year old female who has a history of polycystic ovaries, hypertension, lactose intolerance, s/p hysterectomy and bilateral salpingectomy, preeclampsia, and gestational diabetes who presents to the ED for evaluation of abdominal pain. Patient reports onset of RLQ abdominal pain that radiates into the right lower back on Tuesday 11/6/2018. Patient recent came back from vacation. She characterizes her pain as a constant dull ache. Her pain has been worsening since Tuesday. She has nausea without vomiting. She has decreased appetite as well. Her pain is aggravated by walking and moving. She notes that she will get more nausea     Patient is s/p cholecystectomy for biliary dyskinesia on 9/12/2018 by Dr. Pickering. She has been having trouble with her bowel movements since this time and she has been on a stool softener. She notes that recently her bowel movements have been at baseline. She has kidney stones in her right kidney seen on CT on 6/22/2018. She denies fever, cough, trouble breathing, and urinary issues.     Previous Records Reviewed  CT ABDOMEN PELVIS W CONTRAST 6/22/2018 4:41 PM  IMPRESSION: Multiple bilateral nonobstructing renal calculi. Diffuse  fatty infiltration of the liver.   ALEXANDER CONTEH MD    Problem List:    Patient Active Problem List    Diagnosis Date Noted     S/P hysterectomy 12/24/2014     Priority: Medium     Ovaries in       TONY (obstructive sleep apnea) 03/06/2014     Priority: Medium     Moderate TONY (2/27/2014 with AHI 18.8, shanita desat 89%)       Lactose intolerance 03/11/2011     Priority: Medium     CARDIOVASCULAR SCREENING; LDL GOAL LESS THAN 160 10/31/2010     Priority: Medium     Hypertension goal BP (blood pressure) < 140/90 09/08/2007     Priority: Medium     excellent control of bp - with some minimal LH - continue med, expect LH to improve.- already has. change from 25  mg tart bid to succ 25 mg daily.       Polycystic ovaries 2007     Priority: Medium        Past Medical History:    Past Medical History:   Diagnosis Date     Gestational diabetes 2008     Incompetence of cervix 2008     Preeclampsia        Past Surgical History:    Past Surgical History:   Procedure Laterality Date      SECTION  2005    @ 24 weeks      SECTION      @ 29 weeks     D & C  3/4/08     LAPAROSCOPIC CHOLECYSTECTOMY WITH CHOLANGIOGRAMS N/A 2018    Procedure: LAPAROSCOPIC CHOLECYSTECTOMY WITH CHOLANGIOGRAMS;  Laparoscopic CHOLECYSTECTOMY;  Surgeon: Baltazar Pickering MD;  Location: [facility]     LAPAROSCOPIC HYSTERECTOMY TOTAL, BILATERAL SALPINGO-OOPHORECTOMY, COMBINED N/A 2014    tlh, bilateral salpingectomy       Family History:    Family History   Problem Relation Age of Onset     Diabetes Maternal Grandmother      HEART DISEASE Maternal Grandmother      Open heart surgery, pacemaker     Circulatory Maternal Grandmother      Blood clot     Hypertension Maternal Grandmother      TONY     Thyroid Disease Maternal Grandmother      hypothyroidism     HEART DISEASE Paternal Grandfather      Hypertension Paternal Grandfather      Alcohol/Drug Paternal Grandfather      recovering     Hypertension Mother      Gynecology Mother      fibroids     Diabetes Mother      boarderline     Hypertension Father      Alcohol/Drug Maternal Grandfather      ETOH abuse     Eye Disorder Son      near sided     Thyroid Disease Son      hypothyroidism/hypothyroidism     Autism Spectrum Disorder Son      Dx 10/2015     Depression Son 11     Anxiety Disorder Son      Gynecology Sister      Fibroids     Eye Disorder Daughter      far sided     Thyroid Disease Daughter      hypothyroidism     Endocrine Disease Daughter      Growth delay, starting shots 2016     Ear Disorder Daughter      hearing aid in left ear     Cancer - colorectal Paternal Uncle      in his 40's, also lung     Other - See  Comments Daughter 2     snored       Social History:  Marital Status:   [2]  Social History   Substance Use Topics     Smoking status: Never Smoker     Smokeless tobacco: Never Used     Alcohol use Yes      Comment: occassional         Medications:      acetaminophen (TYLENOL) 500 MG tablet   atenolol (TENORMIN) 50 MG tablet   docusate sodium (COLACE) 100 MG capsule   ibuprofen (ADVIL,MOTRIN) 200 MG tablet   oxyCODONE-acetaminophen (PERCOCET) 5-325 MG per tablet   loratadine (CLARITIN) 10 MG tablet   ORDER FOR DME         Review of Systems    All other systems are reviewed and are negative.    Physical Exam   BP: 127/90  Pulse: 78  Temp: 98  F (36.7  C)  Resp: 18  Weight: 81.6 kg (180 lb)  SpO2: 99 %      Physical Exam  Nontoxic appearing no respiratory distress alert and oriented ×3  Head atraumatic normocephalic  Lungs clear to auscultation  Heart regular no murmur  Abdomen soft mild/moderate right mid abdominal tenderness without guarding or rebound bowel sounds positive no masses or HSM  Strength and sensation grossly intact throughout the extremities, gait and station normal  Speech is fluent, good eye contact, thought processes are rational  Lower extremities without swelling, redness or tenderness  Pedal pulses symmetrical and strong    ED Course     ED Course     Procedures               Critical Care time:  none     Results for orders placed or performed during the hospital encounter of 11/10/18   Abd/pelvis CT - no contrast - Stone Protocol    Narrative    CT ABDOMEN AND PELVIS WITHOUT CONTRAST   11/10/2018 2:38 PM     HISTORY: Right flank/right lower quadrant abdominal pain.     TECHNIQUE:   No IV contrast material. Radiation dose for this scan was  reduced using automated exposure control, adjustment of the mA and/or  kV according to patient size, or iterative reconstruction technique.    COMPARISON: 6/22/2018    FINDINGS:   The lung bases are clear. There is diffuse fatty  infiltration of the  liver. Postoperative changes of cholecystectomy.  Within limits of noncontrast technique, no acute abnormality is seen  in the spleen, pancreas, or adrenal glands.    There are two stones in the right renal pelvis. The largest of these  measures 1.2 cm. There is mild right hydronephrosis and mild  perinephric stranding around the right renal pelvis. There are  additional nonobstructing stones in both kidneys. There is no left  hydronephrosis.    There is a 3 cm cyst in the left adnexa, likely an ovarian cyst. The  uterus is absent. The bowel is normal in caliber without evidence of  obstruction. The appendix is normal.      Impression    IMPRESSION:   1. Two stones in the right renal pelvis causing mild hydronephrosis  and perinephric stranding.  2. Bilateral nephrolithiasis.  3. Fatty infiltration of the liver.  4. A left adnexal cyst, likely an ovarian cyst.    JEFF STEVENSON MD   CBC with platelets differential   Result Value Ref Range    WBC 8.9 4.0 - 11.0 10e9/L    RBC Count 4.49 3.8 - 5.2 10e12/L    Hemoglobin 14.7 11.7 - 15.7 g/dL    Hematocrit 41.7 35.0 - 47.0 %    MCV 93 78 - 100 fl    MCH 32.7 26.5 - 33.0 pg    MCHC 35.3 31.5 - 36.5 g/dL    RDW 12.1 10.0 - 15.0 %    Platelet Count 216 150 - 450 10e9/L    Diff Method Automated Method     % Neutrophils 55.3 %    % Lymphocytes 35.2 %    % Monocytes 5.5 %    % Eosinophils 2.7 %    % Basophils 0.7 %    % Immature Granulocytes 0.6 %    Nucleated RBCs 0 0 /100    Absolute Neutrophil 4.9 1.6 - 8.3 10e9/L    Absolute Lymphocytes 3.1 0.8 - 5.3 10e9/L    Absolute Monocytes 0.5 0.0 - 1.3 10e9/L    Absolute Eosinophils 0.2 0.0 - 0.7 10e9/L    Absolute Basophils 0.1 0.0 - 0.2 10e9/L    Abs Immature Granulocytes 0.1 0 - 0.4 10e9/L    Absolute Nucleated RBC 0.0    Comprehensive metabolic panel   Result Value Ref Range    Sodium 139 133 - 144 mmol/L    Potassium 4.0 3.4 - 5.3 mmol/L    Chloride 107 94 - 109 mmol/L    Carbon Dioxide 24 20 - 32 mmol/L    Anion Gap 8 3 - 14  mmol/L    Glucose 113 (H) 70 - 99 mg/dL    Urea Nitrogen 14 7 - 30 mg/dL    Creatinine 0.71 0.52 - 1.04 mg/dL    GFR Estimate >90 >60 mL/min/1.7m2    GFR Estimate If Black >90 >60 mL/min/1.7m2    Calcium 8.5 8.5 - 10.1 mg/dL    Bilirubin Total 1.6 (H) 0.2 - 1.3 mg/dL    Albumin 3.6 3.4 - 5.0 g/dL    Protein Total 7.8 6.8 - 8.8 g/dL    Alkaline Phosphatase 55 40 - 150 U/L    ALT 35 0 - 50 U/L    AST 31 0 - 45 U/L   UA reflex to Microscopic and Culture   Result Value Ref Range    Color Urine Yellow     Appearance Urine Clear     Glucose Urine Negative NEG^Negative mg/dL    Bilirubin Urine Negative NEG^Negative    Ketones Urine Negative NEG^Negative mg/dL    Specific Gravity Urine 1.016 1.003 - 1.035    Blood Urine Negative NEG^Negative    pH Urine 5.0 5.0 - 7.0 pH    Protein Albumin Urine Negative NEG^Negative mg/dL    Urobilinogen mg/dL 0.0 0.0 - 2.0 mg/dL    Nitrite Urine Negative NEG^Negative    Leukocyte Esterase Urine Moderate (A) NEG^Negative    Source Midstream Urine     RBC Urine 7 (H) 0 - 2 /HPF    WBC Urine 49 (H) 0 - 5 /HPF    Bacteria Urine Few (A) NEG^Negative /HPF    Squamous Epithelial /HPF Urine 1 0 - 1 /HPF    Mucous Urine Present (A) NEG^Negative /LPF   Urine Culture Aerobic Bacterial   Result Value Ref Range    Specimen Description Midstream Urine     Special Requests Specimen received in preservative     Culture Micro <10,000 colonies/mL  urogenital ry                    No results found for this or any previous visit (from the past 24 hour(s)).    Medications - No data to display     1:59 PM Patient assessed.     Assessments & Plan (with Medical Decision Making)  43-year-old female right abdominal pain, 2 proximal stones right renal pelvis with mild hydronephrosis, urinalysis 49 white cells, no dysuria, no bacteria nitrite negative culture pending.  Tolerating pain, without vomiting.  Reviewed with Dr. Sellers urology on-call [facility] who recommends close follow-up in the next 2-3 days, number  dispensed.  Prescriptions as below, return criteria reviewed.     I have reviewed the nursing notes.    I have reviewed the findings, diagnosis, plan and need for follow up with the patient.       Discharge Medication List as of 11/10/2018  4:19 PM      START taking these medications    Details   ondansetron (ZOFRAN ODT) 4 MG ODT tab Take 1 tablet (4 mg) by mouth every 6 hours as needed for nausea, Disp-10 tablet, R-0, Local Print      oxyCODONE-acetaminophen (PERCOCET) 5-325 MG per tablet Take 1-2 tablets by mouth every 4 hours as needed for pain, Disp-12 tablet, R-0, Local Print             Final diagnoses:   Ureterolithiasis     This document serves as a record of the services and decisions personally performed and made by Reji Hurtado MD. It was created on HIS/HER behalf by   Carmen Edward, a trained medical scribe. The creation of this document is based the provider's statements to the medical scribe.  Carmen Edward 1:59 PM 11/10/2018    Provider:   The information in this document, created by the medical scribe for me, accurately reflects the services I personally performed and the decisions made by me. I have reviewed and approved this document for accuracy prior to leaving the patient care area.  Reji Hurtado MD 1:59 PM 11/10/2018    11/10/2018   [facility] EMERGENCY DEPARTMENT     Cornelius Carrillo MD  11/14/18 [id number]

## 2018-11-10 NOTE — DISCHARGE INSTRUCTIONS
Kidney Stone with Pain    The sharp cramping pain on either side of your lower back and nausea/vomiting that you have are because of a small stone that has formed in the kidney. It is now passing down a narrow tube (ureter) on its way to your bladder. Once the stone reaches your bladder, the pain will often stop. But it may come back as the stone continues to pass out of the bladder and through the urethra. The stone may pass in your urine stream in one piece. The size may be 1/16 inch to 1/4 inch (1 mm to 6 mm). Or, the stone may break up into luis fragments that you may not even notice.  Once you have had a kidney stone, you are at risk of getting another one in the future. There are 4 types of kidney stones. Eighty percent are calcium stones--mostly calcium oxalate but also some with calcium phosphate. The other 3 types include uric acid stones, struvite stones (from a preceding infection), and rarely, cystine stones.  Most stones will pass on their own, but may take from a few hours to a few days. Sometimes the stone is too large to pass by itself. In that case, the healthcare provider will need to use other ways to remove the stone. These techniques include:    Lithotripsy. This uses ultrasound waves to break up the stone.    Ureteroscopy. This pushes a basket-like instrument through the urethra and bladder and into the ureter to pull out the stone.    Various types of direct surgery through the skin  Home care  The following are general care guidelines:    Drink plenty of fluids. This means at least 12, 8-ounce glasses of fluid--mostly water--a day.    Each time you urinate, do so in a jar. Pour the urine from the jar through the strainer and into the toilet. Continue doing this until 24 hours after your pain stops. By then, if there was a kidney stone, it should pass from your bladder. Some stones dissolve into sand-like particles and pass right through the strainer. In that case, you won t ever see a  stone.    Save any stone that you find in the strainer and bring it to your healthcare provider to look at. It may be possible to stop certain types of stones from forming. For this reason, it is important to know what kind of stone you have.    Try to stay as active as possible. This will help the stone pass. Don't stay in bed unless your pain keeps you from getting up. You may notice a red, pink, or brown color to your urine. This is normal while passing a kidney stone.    If you develop pain, you may take ibuprofen or naproxen for pain, unless another medicine was prescribed. If you have chronic liver or kidney disease, talk with your healthcare provider before taking these medicines. Also talk with your provider if you've had a stomach ulcer or GI bleeding.  Preventing stones  Each year for the next 5 to 7 years, you are at risk that a new stone will form. Your risk is a 50% chance over this time period. The risk is higher if you have a family history of kidney stones or have certain chronic illnesses like hypertension, obesity, or diabetes. But you can make changes to your lifestyle and diet that can lower your risk for another stone.  Most kidney stones are made of calcium. The following is advice for preventing another calcium stone. If you don t know the type of stone you have, follow this advice until the cause of your stone is found.  Things that help:    The most important thing you can do is to drink plenty of fluids each day. See home care above.     Eat foods that contain phytates. These include wheat, rice, rye, barley, and beans. Phytates are substances that may lower your risk for any type of stone to form.    Eat more fruits and vegetables. Choose those that are high in potassium.    Eat foods high in natural citrate like fruit and low-sugar fruit juices.    Having too little calcium in your diet can put you at risk for calcium kidney stones. Eat a normal amount of calcium in your diet and  talk with your healthcare provider if you are taking calcium supplements. Cutting back on your calcium intake may raise your risk. New research shows that eating calcium-rich and oxalate-rich foods together lowers your risk for stones by binding the minerals in the stomach and intestines before they can reach the kidneys.      Limit salt intake to 2 grams (1 teaspoon) per day. Use limited amounts when cooking, and don t add salt at the table. Processed and canned foods are usually high in salt.     Spinach, rhubarb, peanuts, cashews, almonds, grapefruit, and grapefruit juice are all high oxalate foods. You should limit how much of these you eat. Or eat them with calcium-rich foods. These include dairy products, dark leafy greens, soy products, and calcium-enriched foods.    Reducing the amount of animal meat and high protein foods in your diet may lower your risk for uric acid stones.    Avoid excess sugar (sucrose) and fructose (sweetener in many soft drinks) in your diet.     If you take vitamin C as a supplement, don't take more than 1,000 mg a day.    A dietitian or your healthcare provider can give you information about changes in your diet that will help prevent more kidney stones from forming.  Follow-up care  Follow up with your healthcare provider, or as advised, if the pain lasts more than 48 hours. Talk with your provider about urine and blood tests to find out the cause of your stone. If you had an X-ray, CT scan, or other diagnostic test, you will be told of any new findings that may affect your care.  Call 911  Call 911 if you have any of these:    Weakness, dizziness, or fainting  When to seek medical advice  Call your healthcare provider right away if any of these occur:    Pain that is not controlled by the medicine given    Repeated vomiting or unable to keep down fluids    Fever of 100.4 F (38 C) or higher, or as directed by your healthcare provider    Passage of solid red or brown urine (can't  see through it) or urine with lots of blood clots    Foul-smelling or cloudy urine    Unable to pass urine for 8 hours and increasing bladder pressure  Date Last Reviewed: 10/1/2016    7618-6131 [employer]. [address] [place] [address], [place] [address]. All rights reserved. This information is not intended as a substitute for professional medical care. Always follow your healthcare professional's instructions.      Percocet for pain, Zofran for nausea,    First thing Monday call [phone], or [phone], or [phone], asked to speak to a nurse, let them know that you were in the emergency department on 11/10, that I spoke with Dr. Sellers urology [facility] on-call who recommends follow-up early this week.

## 2018-11-11 LAB
BACTERIA SPEC CULT: NORMAL
Lab: NORMAL
SPECIMEN SOURCE: NORMAL

## 2018-11-12 ENCOUNTER — TELEPHONE (OUTPATIENT)
Dept: UROLOGY | Facility: CLINIC | Age: 43
End: 2018-11-12

## 2018-11-12 ENCOUNTER — OFFICE VISIT (OUTPATIENT)
Dept: FAMILY MEDICINE | Facility: CLINIC | Age: 43
End: 2018-11-12
Payer: COMMERCIAL

## 2018-11-12 VITALS
DIASTOLIC BLOOD PRESSURE: 72 MMHG | RESPIRATION RATE: 16 BRPM | BODY MASS INDEX: 29.5 KG/M2 | OXYGEN SATURATION: 96 % | HEART RATE: 70 BPM | WEIGHT: 180 LBS | TEMPERATURE: 99.2 F | SYSTOLIC BLOOD PRESSURE: 124 MMHG

## 2018-11-12 DIAGNOSIS — N20.0 CALCULUS OF RIGHT KIDNEY: ICD-10-CM

## 2018-11-12 DIAGNOSIS — N20.0 CALCULUS OF KIDNEY: Primary | ICD-10-CM

## 2018-11-12 DIAGNOSIS — Z01.818 PREOP GENERAL PHYSICAL EXAM: Primary | ICD-10-CM

## 2018-11-12 PROCEDURE — 99214 OFFICE O/P EST MOD 30 MIN: CPT | Performed by: INTERNAL MEDICINE

## 2018-11-12 RX ORDER — CEFAZOLIN SODIUM 2 G/50ML
2 SOLUTION INTRAVENOUS
Status: CANCELLED | OUTPATIENT
Start: 2018-11-12

## 2018-11-12 RX ORDER — CEFAZOLIN SODIUM 1 G/50ML
1 INJECTION, SOLUTION INTRAVENOUS SEE ADMIN INSTRUCTIONS
Status: CANCELLED | OUTPATIENT
Start: 2018-11-12

## 2018-11-12 NOTE — TELEPHONE ENCOUNTER
Appointment made for Wednesday at 11am with rosi  Patient confirmed. Rita Correa LPN Staff Nurse

## 2018-11-12 NOTE — TELEPHONE ENCOUNTER
----- Message from Lorna Sellers MD sent at 11/12/2018 10:55 AM CST -----  I said a visit. She could come Wednesday and then surgery shortly thereafter.   ----- Message -----     From: Rita Correa LPN     Sent: 11/12/2018  10:12 AM       To: Martha Carrillo RN, #    I received a call from this patient this morning. She was in the ED this weekend. I think [place] ED. She said that you told her that she should get in for surgery this week. Do you want want me to bring her in for a visit or just straight to surgery?    Thanks,  Martha

## 2018-11-12 NOTE — PROGRESS NOTES
"  SUBJECTIVE:   Alia Jauregui is a 43 year old female who presents to clinic today for the following health issues:  {Provider please address medication reconciliation discrepancies--rooming staff please delete if no med/rec issues}    {Superlists:035888}    {additional problems for provider to add:221841}    Problem list and histories reviewed & adjusted, as indicated.  Additional history: {NONE - AS DOCUMENTED:749527::\"as documented\"}    {HIST REVIEW/ LINKS 2:847827}    Reviewed and updated as needed this visit by clinical staff       Reviewed and updated as needed this visit by Provider         {PROVIDER CHARTING PREFERENCE:841613}  "

## 2018-11-12 NOTE — PROGRESS NOTES
[facility]  [address] [address]  [phone]  Dept: [phone]    PRE-OP EVALUATION:  Today's date: 2018    Alia Jauregui (: 1975) presents for pre-operative evaluation assessment as requested by Dr. Sellers.  She requires evaluation and anesthesia risk assessment prior to undergoing surgery/procedure for treatment of Kidney stones .    Proposed Surgery/ Procedure: Kidney stone removal  Date of Surgery/ Procedure: To be determined  Time of Surgery/ Procedure: To be determined  Hospital/Surgical Facility: [facility]  Primary Physician: Tariq Paige  Type of Anesthesia Anticipated: to be determined    Patient has a Health Care Directive or Living Will:  NO    1. NO - Do you have a history of heart attack, stroke, stent, bypass or surgery on an artery in the head, neck, heart or legs?  2. NO - Do you ever have any pain or discomfort in your chest?  3. NO - Do you have a history of  Heart Failure?  4. NO - Are you troubled by shortness of breath when: walking on the level, up a slight hill or at night?  5. NO - Do you currently have a cold, bronchitis or other respiratory infection?  6. NO - Do you have a cough, shortness of breath or wheezing?  7. NO - Do you sometimes get pains in the calves of your legs when you walk?  8. NO - Do you or anyone in your family have previous history of blood clots?  9. NO - Do you or does anyone in your family have a serious bleeding problem such as prolonged bleeding following surgeries or cuts?  10. NO - Have you ever had problems with anemia or been told to take iron pills?  11. NO - Have you had any abnormal blood loss such as black, tarry or bloody stools, or abnormal vaginal bleeding?  12. NO - Have you ever had a blood transfusion?  13. YES - HAVE YOU OR ANY OF YOUR RELATIVES EVER HAD PROBLEMS WITH ANESTHESIA? Self and daughter- hard to wake up. Grandmother was allergic to several anesthesia  medications.  14. YES - DO YOU HAVE SLEEP APNEA, EXCESSIVE SNORING OR DAYTIME DROWSINESS? Sleep apnea  15. NO - Do you have any prosthetic heart valves?  16. NO - Do you have prosthetic joints?  17. NO - Is there any chance that you may be pregnant?      HPI:     HPI related to upcoming procedure:  Started having right sided abdominal pain last week and went to the ED a couple days ago and CT found obstructing renal stones in the right kidney causing mild hydronephrosis.  She continues to have pain and nausea.  Otherwise feeling well.        See problem list for active medical problems.  Problems all longstanding and stable, except as noted/documented.  See ROS for pertinent symptoms related to these conditions.                                                                                                                                                          .    MEDICAL HISTORY:     Patient Active Problem List    Diagnosis Date Noted     S/P hysterectomy 2014     Priority: Medium     Ovaries in       TNOY (obstructive sleep apnea) 2014     Priority: Medium     Moderate TONY (2014 with AHI 18.8, shanita desat 89%)       Lactose intolerance 2011     Priority: Medium     CARDIOVASCULAR SCREENING; LDL GOAL LESS THAN 160 10/31/2010     Priority: Medium     Hypertension goal BP (blood pressure) < 140/90 2007     Priority: Medium     excellent control of bp - with some minimal LH - continue med, expect LH to improve.- already has. change from 25 mg tart bid to succ 25 mg daily.       Polycystic ovaries 2007     Priority: Medium      Past Medical History:   Diagnosis Date     Gestational diabetes 2008     Incompetence of cervix 2008-cerclage placed, Dr. Quezada following pt with mfm      Preeclampsia      Past Surgical History:   Procedure Laterality Date      SECTION  2005    @ 24 weeks      SECTION      @ 29 weeks     D & C  3/4/08     LAPAROSCOPIC  CHOLECYSTECTOMY WITH CHOLANGIOGRAMS N/A 9/12/2018    Procedure: LAPAROSCOPIC CHOLECYSTECTOMY WITH CHOLANGIOGRAMS;  Laparoscopic CHOLECYSTECTOMY;  Surgeon: Baltazar Pickering MD;  Location: [facility]     LAPAROSCOPIC HYSTERECTOMY TOTAL, BILATERAL SALPINGO-OOPHORECTOMY, COMBINED N/A 12/24/2014    tlh, bilateral salpingectomy     Current Outpatient Prescriptions   Medication Sig Dispense Refill     acetaminophen (TYLENOL) 500 MG tablet Take 1,000 mg by mouth every 6 hours as needed for mild pain       atenolol (TENORMIN) 50 MG tablet TAKE ONE TABLET (50 MG) BY MOUTH ONCE DAILY 90 tablet 2     docusate sodium (COLACE) 100 MG capsule Take 100 mg by mouth daily       oxyCODONE-acetaminophen (PERCOCET) 5-325 MG per tablet Take 1-2 tablets by mouth every 4 hours as needed for pain 12 tablet 0     ibuprofen (ADVIL,MOTRIN) 200 MG tablet Take 400 mg by mouth every 6 hours as needed        loratadine (CLARITIN) 10 MG tablet Take 10 mg by mouth daily as needed        ondansetron (ZOFRAN ODT) 4 MG ODT tab Take 1 tablet (4 mg) by mouth every 6 hours as needed for nausea (Patient not taking: Reported on 11/12/2018) 10 tablet 0     ORDER FOR [facility] Respironics Remstar 60 series auto 5-15 cm H2O, wisp s/m       OTC products: None, except as noted above    Allergies   Allergen Reactions     Nkda [No Known Drug Allergies]       Latex Allergy: NO    Social History   Substance Use Topics     Smoking status: Never Smoker     Smokeless tobacco: Never Used     Alcohol use Yes      Comment: occassional      History   Drug Use No       REVIEW OF SYSTEMS:   Constitutional, neuro, ENT, endocrine, pulmonary, cardiac, gastrointestinal, genitourinary, musculoskeletal, integument and psychiatric systems are negative, except as otherwise noted.    EXAM:   /72  Pulse 70  Temp 99.2  F (37.3  C) (Tympanic)  Resp 16  Wt 180 lb (81.6 kg)  LMP 12/10/2014  SpO2 96%  BMI 29.5 kg/m2      GENERAL APPEARANCE: healthy, alert and no distress     HENT: normal  ear canals and TMs, nose and mouth without ulcers or lesions     NECK: no adenopathy, no asymmetry, masses, or scars     RESP: lungs clear to auscultation - no rales, rhonchi or wheezes     CV: regular rates and rhythm, normal S1 S2, no S3 or S4 and no murmur, click or rub -     ABDOMEN:  soft, mild right abdominal tenderness, and bowel sounds normal     MS: extremities normal- no gross deformities noted, no evidence of inflammation in joints       NEURO: mentation intact and speech normal     PSYCH: mentation appears normal. and affect normal/bright     LYMPHATICS: No cervical or supraclavicular nodes      DIAGNOSTICS:     EKG done two months ago was normal    Recent Labs   Lab Test  11/10/18   1410  06/20/18   1637   HGB  14.7  15.1   PLT  216  220   NA  139  138   POTASSIUM  4.0  3.7   CR  0.71  0.79        IMPRESSION:   Reason for surgery/procedure: Right renal calculus   Diagnosis/reason for consult: Preop eval    The proposed surgical procedure is considered LOW risk.    REVISED CARDIAC RISK INDEX  The patient has the following serious cardiovascular risks for perioperative complications such as (MI, PE, VFib and 3  AV Block):  No serious cardiac risks  INTERPRETATION: 0 risks: Class I (very low risk - 0.4% complication rate)    The patient has the following additional risks for perioperative complications:  No identified additional risks      ICD-10-CM    1. Preop general physical exam Z01.818    2. Calculus of right kidney N20.0        RECOMMENDATIONS:       Cardiovascular Risk  Performs 4 METs exercise without symptoms (Climb a flight of stairs) .   EKG done two months ago was normal  No further work-up needed    --Patient is to take all scheduled medications on the day of surgery.    APPROVAL GIVEN to proceed with proposed procedure, without further diagnostic evaluation       Signed Electronically by: Tariq Paige MD    Copy of this evaluation report is provided to requesting physician.    [facility] Preop  Guidelines    Revised Cardiac Risk Index

## 2018-11-12 NOTE — MR AVS SNAPSHOT
After Visit Summary   11/12/2018    Alia Jauregui    MRN: [id number]           Patient Information     Date Of Birth          1975        Visit Information        Provider Department      11/12/2018 4:40 PM Tariq Paige MD [facility]        Today's Diagnoses     Preop general physical exam    -  1      Care Instructions    Avoid ibuprofen for 1 day prior to surgery, avoid naproxen for 3 days prior, and avoid products containing aspirin for 1 week before surgery.  Tylenol is okay to take for pain if needed.      Before Your Surgery      Call your surgeon if there is any change in your health. This includes signs of a cold or flu (such as a sore throat, runny nose, cough, rash or fever).    Do not smoke, drink alcohol or take over the counter medicine (unless your surgeon or primary care doctor tells you to) for the 24 hours before and after surgery.    If you take prescribed drugs: Follow your doctor s orders about which medicines to take and which to stop until after surgery.    Eating and drinking prior to surgery: follow the instructions from your surgeon    Take a shower or bath the night before surgery. Use the soap your surgeon gave you to gently clean your skin. If you do not have soap from your surgeon, use your regular soap. Do not shave or scrub the surgery site.  Wear clean pajamas and have clean sheets on your bed.           Follow-ups after your visit        Your next 10 appointments already scheduled     Nov 14, 2018 11:00 AM CST   (Arrive by 10:45 AM)   Return Visit with Lorna Sellers MD   [facility]rology and [facility]rolog[facility] ([facility])    [address] [place] [id number] [phone]              Who to contact     If you have questions or need follow up information about today's clinic visit or your schedule please contact [facility] directly at  [phone].  Normal or non-critical lab and imaging results will be communicated to you by MyChart, letter or phone within 4 business days after the clinic has received the results. If you do not hear from us within 7 days, please contact the clinic through [employer] or phone. If you have a critical or abnormal lab result, we will notify you by phone as soon as possible.  Submit refill requests through [employer] or call your pharmacy and they will forward the refill request to us. Please allow 3 business days for your refill to be completed.          Additional Information About Your Visit        [employer] Information     [employer] gives you secure access to your electronic health record. If you see a primary care provider, you can also send messages to your care team and make appointments. If you have questions, please call your primary care clinic.  If you do not have a primary care provider, please call [phone] and they will assist you.        Care EveryWhere ID     This is your Care EveryWhere ID. This could be used by other organizations to access your [facility] medical records  [id number]        Your Vitals Were     Pulse Temperature Respirations Last Period Pulse Oximetry BMI (Body Mass Index)    70 99.2  F (37.3  C) (Tympanic) 16 12/10/2014 96% 29.5 kg/m2       Blood Pressure from Last 3 Encounters:   11/12/18 124/72   11/10/18 126/72   09/24/18 132/89    Weight from Last 3 Encounters:   11/12/18 180 lb (81.6 kg)   11/10/18 180 lb (81.6 kg)   09/24/18 181 lb (82.1 kg)              Today, you had the following     No orders found for display       Primary Care Provider Office Phone # Fax #    Tariq Paige [phone] [address] [address]        Equal Access to Services     FELIPE WU : Ramona Parker, mere marr, qaybta kapetey gray. So [facility] [phone].    ATENCIÓN: nash Jeter  disposición servicios gratuitos de asistencia lingüística. Anthony sunshine [phone].    We comply with applicable federal civil rights laws and [place] laws. We do not discriminate on the basis of race, color, national origin, age, disability, sex, sexual orientation, or gender identity.            Thank you!     Thank you for choosing [facility]  for your care. Our goal is always to provide you with excellent care. Hearing back from our patients is one way we can continue to improve our services. Please take a few minutes to complete the written survey that you may receive in the mail after your visit with us. Thank you!             Your Updated Medication List - Protect others around you: Learn how to safely use, store and throw away your medicines at www.disposemymeds.org.          This list is accurate as of 11/12/18  4:49 PM.  Always use your most recent med list.                   Brand Name Dispense Instructions for use Diagnosis    acetaminophen 500 MG tablet    TYLENOL     Take 1,000 mg by mouth every 6 hours as needed for mild pain        atenolol 50 MG tablet    TENORMIN    90 tablet    TAKE ONE TABLET (50 MG) BY MOUTH ONCE DAILY    Hypertension goal BP (blood pressure) < 140/90       docusate sodium 100 MG capsule    COLACE     Take 100 mg by mouth daily        ibuprofen 200 MG tablet    ADVIL/MOTRIN     Take 400 mg by mouth every 6 hours as needed        loratadine 10 MG tablet    CLARITIN     Take 10 mg by mouth daily as needed        ondansetron 4 MG ODT tab    ZOFRAN ODT    10 tablet    Take 1 tablet (4 mg) by mouth every 6 hours as needed for nausea        order for [facility]      Respironics Remstar 60 series auto 5-15 cm H2O, wisp s/m    TONY (obstructive sleep apnea)       oxyCODONE-acetaminophen 5-325 MG per tablet    PERCOCET    12 tablet    Take 1-2 tablets by mouth every 4 hours as needed for pain

## 2018-11-14 ENCOUNTER — OFFICE VISIT (OUTPATIENT)
Dept: UROLOGY | Facility: CLINIC | Age: 43
End: 2018-11-14
Payer: COMMERCIAL

## 2018-11-14 VITALS — SYSTOLIC BLOOD PRESSURE: 152 MMHG | BODY MASS INDEX: 29.47 KG/M2 | DIASTOLIC BLOOD PRESSURE: 91 MMHG | WEIGHT: 179.8 LBS

## 2018-11-14 DIAGNOSIS — N20.0 CALCULUS OF KIDNEY: Primary | ICD-10-CM

## 2018-11-14 NOTE — LETTER
2018       RE: Alia Jauregui  [address] [facility] [address]     Dear Colleague,    Thank you for referring your patient, Alia Jauregui, to the [facility] UROLOGY AND INST FOR PROSTATE AND UROLOGIC CANCERS at [facility]. Please see a copy of my visit note below.      SUBJECTIVE:                                                      Alia Jauregui is a 43 year old female who comes in for problems related to kidney stones.    Patient presents after ED visit for right flank pain, nausea, and vomiting revealed a large renal pelvis stone (as well as a small renal pelvis stone) and a right upper pole renal calculus (small).   Patient noted symptoms ~ one week ago in [place] but these worsened upon return to [place].       We reviewed the images together, discussed ureteroscopy for treatment of stones, stone risk assessment, post op care, stent.     Past Medical History:   Diagnosis Date     Gestational diabetes      Incompetence of cervix 2008-cerclage placed, Dr. Quezada following pt with mfm      Preeclampsia        Past Surgical History:   Procedure Laterality Date      SECTION  2005    @ 24 weeks      SECTION      @ 29 weeks     D & C  3/4/08     LAPAROSCOPIC CHOLECYSTECTOMY WITH CHOLANGIOGRAMS N/A 2018    Procedure: LAPAROSCOPIC CHOLECYSTECTOMY WITH CHOLANGIOGRAMS;  Laparoscopic CHOLECYSTECTOMY;  Surgeon: Baltazar Pickering MD;  Location: [facility]     LAPAROSCOPIC HYSTERECTOMY TOTAL, BILATERAL SALPINGO-OOPHORECTOMY, COMBINED N/A 2014    tlh, bilateral salpingectomy       Family History   Problem Relation Age of Onset     Diabetes Maternal Grandmother      HEART DISEASE Maternal Grandmother      Open heart surgery, pacemaker     Circulatory Maternal Grandmother      Blood clot     Hypertension Maternal Grandmother      TONY     Thyroid Disease Maternal Grandmother      hypothyroidism     HEART DISEASE Paternal Grandfather       Hypertension Paternal Grandfather      Alcohol/Drug Paternal Grandfather      recovering     Hypertension Mother      Gynecology Mother      fibroids     Diabetes Mother      boarderline     Hypertension Father      Alcohol/Drug Maternal Grandfather      ETOH abuse     Eye Disorder Son      near sided     Thyroid Disease Son      hypothyroidism/hypothyroidism     Autism Spectrum Disorder Son      Dx 10/2015     Depression Son 11     Anxiety Disorder Son      Gynecology Sister      Fibroids     Eye Disorder Daughter      far sided     Thyroid Disease Daughter      hypothyroidism     Endocrine Disease Daughter      Growth delay, starting shots 8/2016     Ear Disorder Daughter      hearing aid in left ear     Cancer - colorectal Paternal Uncle      in his 40's, also lung     Other - See Comments Daughter 2     snored       Social History   Substance Use Topics     Smoking status: Never Smoker     Smokeless tobacco: Never Used     Alcohol use Yes      Comment: occassional          OBJECTIVE:                                                    BP (!) 152/91  Wt 81.6 kg (179 lb 12.8 oz)  LMP 12/10/2014  BMI 29.47 kg/m2  Body mass index is 29.47 kg/(m^2).    EXAM:    GENERAL APPEARANCE: healthy, alert and no distress  RESP: negative   CV: negative   Abdomen: Abdomen soft, non-tender  CVAT: absent  SKIN: no suspicious lesions or rashes    IMPRESSION: Kidney stones    Diagnostic test results:  CT scan reviewed with the patient      ASSESSMENT/PLAN:                                                    No diagnosis found.    Scheduled for ureteroscopy with laser lithotripsy and stent placement on the right on 11/15/2018    Lorna Sellers MD  [facility] UROLOGY AND [facility] FOR PROSTATE AND UROLOGIC CANCERS    I spent over 20 minutes with the patient.  Over half this time was spent on counseling for renal calculus.

## 2018-11-14 NOTE — PROGRESS NOTES
SUBJECTIVE:                                                      Alia Jauregui is a 43 year old female who comes in for problems related to kidney stones.    Patient presents after ED visit for right flank pain, nausea, and vomiting revealed a large renal pelvis stone (as well as a small renal pelvis stone) and a right upper pole renal calculus (small).   Patient noted symptoms ~ one week ago in [place] but these worsened upon return to [place].       We reviewed the images together, discussed ureteroscopy for treatment of stones, stone risk assessment, post op care, stent.     ROS:  CONSTITUTIONAL:NEGATIVE for fever, chills, change in weight  INTEGUMENTARY/SKIN: NEGATIVE for worrisome rashes, moles or lesions  EYES: NEGATIVE for vision changes or irritation  ENT/MOUTH: NEGATIVE for ear, mouth and throat problems  RESP:NEGATIVE for significant cough or SOB  CV: NEGATIVE for chest pain, palpitations or peripheral edema  GI: NEGATIVE for nausea, abdominal pain, heartburn, or change in bowel habits  : no unusual vaginal symptoms  MUSCULOSKELETAL: NEGATIVE for significant arthralgias or myalgia  PSYCHIATRIC: NEGATIVE for changes in mood or affect    Past Medical History:   Diagnosis Date     Gestational diabetes 2008     Incompetence of cervix 2008-cerclage placed, Dr. Quezada following pt with mfm      Preeclampsia        Past Surgical History:   Procedure Laterality Date      SECTION  2005    @ 24 weeks      SECTION      @ 29 weeks     D & C  3/4/08     LAPAROSCOPIC CHOLECYSTECTOMY WITH CHOLANGIOGRAMS N/A 2018    Procedure: LAPAROSCOPIC CHOLECYSTECTOMY WITH CHOLANGIOGRAMS;  Laparoscopic CHOLECYSTECTOMY;  Surgeon: Baltazar Pickering MD;  Location: [facility]     LAPAROSCOPIC HYSTERECTOMY TOTAL, BILATERAL SALPINGO-OOPHORECTOMY, COMBINED N/A 2014    tlh, bilateral salpingectomy       Family History   Problem Relation Age of Onset     Diabetes Maternal Grandmother      HEART  DISEASE Maternal Grandmother      Open heart surgery, pacemaker     Circulatory Maternal Grandmother      Blood clot     Hypertension Maternal Grandmother      TONY     Thyroid Disease Maternal Grandmother      hypothyroidism     HEART DISEASE Paternal Grandfather      Hypertension Paternal Grandfather      Alcohol/Drug Paternal Grandfather      recovering     Hypertension Mother      Gynecology Mother      fibroids     Diabetes Mother      boarderline     Hypertension Father      Alcohol/Drug Maternal Grandfather      ETOH abuse     Eye Disorder Son      near sided     Thyroid Disease Son      hypothyroidism/hypothyroidism     Autism Spectrum Disorder Son      Dx 10/2015     Depression Son 11     Anxiety Disorder Son      Gynecology Sister      Fibroids     Eye Disorder Daughter      far sided     Thyroid Disease Daughter      hypothyroidism     Endocrine Disease Daughter      Growth delay, starting shots 8/2016     Ear Disorder Daughter      hearing aid in left ear     Cancer - colorectal Paternal Uncle      in his 40's, also lung     Other - See Comments Daughter 2     snored       Social History   Substance Use Topics     Smoking status: Never Smoker     Smokeless tobacco: Never Used     Alcohol use Yes      Comment: occassional          OBJECTIVE:                                                    BP (!) 152/91  Wt 81.6 kg (179 lb 12.8 oz)  LMP 12/10/2014  BMI 29.47 kg/m2  Body mass index is 29.47 kg/(m^2).    EXAM:    GENERAL APPEARANCE: healthy, alert and no distress  RESP: negative   CV: negative   Abdomen: Abdomen soft, non-tender  CVAT: absent  SKIN: no suspicious lesions or rashes    IMPRESSION: Kidney stones    Diagnostic test results:  CT scan reviewed with the patient      ASSESSMENT/PLAN:                                                    No diagnosis found.    Scheduled for ureteroscopy with laser lithotripsy and stent placement on the right on 11/15/2018    Lorna Selelrs MD  [facility]  UROLOGY AND INST FOR PROSTATE AND UROLOGIC CANCERS    I spent over 20 minutes with the patient.  Over half this time was spent on counseling for renal calculus.

## 2018-11-14 NOTE — MR AVS SNAPSHOT
After Visit Summary   11/14/2018    Alia Jauregui    MRN: [id number]           Patient Information     Date Of Birth          1975        Visit Information        Provider Department      11/14/2018 11:00 AM Lorna Sellers MD [facility]        Today's Diagnoses     Calculus of kidney    -  1       Follow-ups after your visit        Your next 10 appointments already scheduled     Nov 15, 2018   Procedure with Lorna Sellers MD   [facility] (--)    [address]  [facility] [address] [phone]              Who to contact     Please call your clinic at [phone] to:    Ask questions about your health    Make or cancel appointments    Discuss your medicines    Learn about your test results    Speak to your doctor            Additional Information About Your Visit        MyChart Information     P[facility]RITH gives you secure access to your electronic health record. If you see a primary care provider, you can also send messages to your care team and make appointments. If you have questions, please call your primary care clinic.  If you do not have a primary care provider, please call [phone] and they will assist you.      P[employer]RITH is an electronic gateway that provides easy, online access to your medical records. With P[facility]RITH, you can request a clinic appointment, read your test results, renew a prescription or communicate with your care team.     To access your existing account, please contact your [facility] or call [phone] for assistance.        Care EveryWhere ID     This is your Care EveryWhere ID. This could be used by other organizations to access your [place] medical records  [id number]        Your Vitals Were     Last Period BMI (Body Mass Index)                12/10/2014 29.47 kg/m2           Blood Pressure from Last 3 Encounters:   11/14/18  (!) 152/91   11/12/18 124/72   11/10/18 126/72    Weight from Last 3 Encounters:   11/14/18 81.6 kg (179 lb 12.8 oz)   11/12/18 81.6 kg (180 lb)   11/10/18 81.6 kg (180 lb)              Today, you had the following     No orders found for display       Primary Care Provider Office Phone # Fax #    KayleyBelia Paige [phone] [address] [facility] [address]        Equal Access to Services     [facility]: Hadii aad ku hadasho Soomaali, waaxda luqadaha, qaybta kaalmada adeegyada, petey copeland . So [facility] [phone].    ATENCIÓN: Si habla español, tiene a wilson disposición servicios gratuitos de asistencia lingüística. [facility] [phone].    We comply with applicable federal civil rights laws and [place] laws. We do not discriminate on the basis of race, color, national origin, age, disability, sex, sexual orientation, or gender identity.            Thank you!     Thank you for choosing [facility] AND [facility]  for your care. Our goal is always to provide you with excellent care. Hearing back from our patients is one way we can continue to improve our services. Please take a few minutes to complete the written survey that you may receive in the mail after your visit with us. Thank you!             Your Updated Medication List - Protect others around you: Learn how to safely use, store and throw away your medicines at www.disposemymeds.org.          This list is accurate as of 11/14/18  2:56 PM.  Always use your most recent med list.                   Brand Name Dispense Instructions for use Diagnosis    acetaminophen 500 MG tablet    TYLENOL     Take 1,000 mg by mouth every 6 hours as needed for mild pain        atenolol 50 MG tablet    TENORMIN    90 tablet    TAKE ONE TABLET (50 MG) BY MOUTH ONCE DAILY    Hypertension goal BP (blood pressure) < 140/90       docusate sodium 100 MG capsule    COLACE     Take 100 mg by mouth  daily        ibuprofen 200 MG tablet    ADVIL/MOTRIN     Take 400 mg by mouth every 6 hours as needed        loratadine 10 MG tablet    CLARITIN     Take 10 mg by mouth daily as needed        order for DME      Respironics Remstar 60 series auto 5-15 cm H2O, wisp s/m    TONY (obstructive sleep apnea)       oxyCODONE-acetaminophen 5-325 MG per tablet    PERCOCET    12 tablet    Take 1-2 tablets by mouth every 4 hours as needed for pain

## 2018-11-15 ENCOUNTER — APPOINTMENT (OUTPATIENT)
Dept: GENERAL RADIOLOGY | Facility: CLINIC | Age: 43
End: 2018-11-15
Attending: UROLOGY
Payer: COMMERCIAL

## 2018-11-15 ENCOUNTER — SURGERY (OUTPATIENT)
Age: 43
End: 2018-11-15

## 2018-11-15 ENCOUNTER — ANESTHESIA (OUTPATIENT)
Dept: SURGERY | Facility: CLINIC | Age: 43
End: 2018-11-15
Payer: COMMERCIAL

## 2018-11-15 ENCOUNTER — ANESTHESIA EVENT (OUTPATIENT)
Dept: SURGERY | Facility: CLINIC | Age: 43
End: 2018-11-15
Payer: COMMERCIAL

## 2018-11-15 ENCOUNTER — HOSPITAL ENCOUNTER (OUTPATIENT)
Facility: CLINIC | Age: 43
Discharge: HOME | End: 2018-11-15
Attending: UROLOGY | Admitting: UROLOGY
Payer: COMMERCIAL

## 2018-11-15 VITALS
HEIGHT: 66 IN | HEART RATE: 70 BPM | DIASTOLIC BLOOD PRESSURE: 82 MMHG | TEMPERATURE: 97.5 F | OXYGEN SATURATION: 94 % | SYSTOLIC BLOOD PRESSURE: 132 MMHG | BODY MASS INDEX: 28.73 KG/M2 | WEIGHT: 178.8 LBS | RESPIRATION RATE: 14 BRPM

## 2018-11-15 DIAGNOSIS — N20.0 RENAL CALCULI: Primary | ICD-10-CM

## 2018-11-15 PROCEDURE — C1758 CATHETER, URETERAL: HCPCS | Performed by: UROLOGY

## 2018-11-15 PROCEDURE — 36000058 ZZH SURGERY LEVEL 3 EA 15 ADDTL MIN: Performed by: UROLOGY

## 2018-11-15 PROCEDURE — 36000056 ZZH SURGERY LEVEL 3 1ST 30 MIN: Performed by: UROLOGY

## 2018-11-15 PROCEDURE — 71000027 ZZH RECOVERY PHASE 2 EACH 15 MINS: Performed by: UROLOGY

## 2018-11-15 PROCEDURE — 40000277 XR SURGERY CARM FLUORO LESS THAN 5 MIN W STILLS: Mod: TC

## 2018-11-15 PROCEDURE — 25000128 H RX IP 250 OP 636: Performed by: UROLOGY

## 2018-11-15 PROCEDURE — 88300 SURGICAL PATH GROSS: CPT | Performed by: UROLOGY

## 2018-11-15 PROCEDURE — 74420 UROGRAPHY RTRGR +-KUB: CPT | Mod: 26 | Performed by: UROLOGY

## 2018-11-15 PROCEDURE — 25000132 ZZH RX MED GY IP 250 OP 250 PS 637: Performed by: ANESTHESIOLOGY

## 2018-11-15 PROCEDURE — 25000128 H RX IP 250 OP 636: Performed by: NURSE ANESTHETIST, CERTIFIED REGISTERED

## 2018-11-15 PROCEDURE — 25000132 ZZH RX MED GY IP 250 OP 250 PS 637: Performed by: UROLOGY

## 2018-11-15 PROCEDURE — 71000012 ZZH RECOVERY PHASE 1 LEVEL 1 FIRST HR: Performed by: UROLOGY

## 2018-11-15 PROCEDURE — 25000125 ZZHC RX 250: Performed by: NURSE ANESTHETIST, CERTIFIED REGISTERED

## 2018-11-15 PROCEDURE — 37000009 ZZH ANESTHESIA TECHNICAL FEE, EACH ADDTL 15 MIN: Performed by: UROLOGY

## 2018-11-15 PROCEDURE — 88300 SURGICAL PATH GROSS: CPT | Mod: 26 | Performed by: UROLOGY

## 2018-11-15 PROCEDURE — 25000128 H RX IP 250 OP 636: Performed by: ANESTHESIOLOGY

## 2018-11-15 PROCEDURE — 37000008 ZZH ANESTHESIA TECHNICAL FEE, 1ST 30 MIN: Performed by: UROLOGY

## 2018-11-15 PROCEDURE — 25500064 ZZH RX 255 OP 636: Performed by: UROLOGY

## 2018-11-15 PROCEDURE — 25000125 ZZHC RX 250: Performed by: UROLOGY

## 2018-11-15 PROCEDURE — 25000566 ZZH SEVOFLURANE, EA 15 MIN: Performed by: UROLOGY

## 2018-11-15 PROCEDURE — C1894 INTRO/SHEATH, NON-LASER: HCPCS | Performed by: UROLOGY

## 2018-11-15 PROCEDURE — 25800025 ZZH RX 258: Performed by: UROLOGY

## 2018-11-15 PROCEDURE — 71000013 ZZH RECOVERY PHASE 1 LEVEL 1 EA ADDTL HR: Performed by: UROLOGY

## 2018-11-15 PROCEDURE — 82365 CALCULUS SPECTROSCOPY: CPT | Performed by: UROLOGY

## 2018-11-15 PROCEDURE — 52356 CYSTO/URETERO W/LITHOTRIPSY: CPT | Mod: RT | Performed by: UROLOGY

## 2018-11-15 PROCEDURE — 27210794 ZZH OR GENERAL SUPPLY STERILE: Performed by: UROLOGY

## 2018-11-15 PROCEDURE — 25000125 ZZHC RX 250: Performed by: ANESTHESIOLOGY

## 2018-11-15 PROCEDURE — C1769 GUIDE WIRE: HCPCS | Performed by: UROLOGY

## 2018-11-15 PROCEDURE — C2617 STENT, NON-COR, TEM W/O DEL: HCPCS | Performed by: UROLOGY

## 2018-11-15 PROCEDURE — 40000170 ZZH STATISTIC PRE-PROCEDURE ASSESSMENT II: Performed by: UROLOGY

## 2018-11-15 DEVICE — STENT URETERAL CONTOUR SOFT PERCUFLEX 6FRX26CM: Type: IMPLANTABLE DEVICE | Site: URETER | Status: FUNCTIONAL

## 2018-11-15 RX ORDER — OXYCODONE HYDROCHLORIDE 5 MG/1
5 TABLET ORAL ONCE
Status: COMPLETED | OUTPATIENT
Start: 2018-11-15 | End: 2018-11-15

## 2018-11-15 RX ORDER — ONDANSETRON 4 MG/1
4 TABLET, ORALLY DISINTEGRATING ORAL EVERY 30 MIN PRN
Status: DISCONTINUED | OUTPATIENT
Start: 2018-11-15 | End: 2018-11-15 | Stop reason: HOSPADM

## 2018-11-15 RX ORDER — NALOXONE HYDROCHLORIDE 0.4 MG/ML
.1-.4 INJECTION, SOLUTION INTRAMUSCULAR; INTRAVENOUS; SUBCUTANEOUS
Status: DISCONTINUED | OUTPATIENT
Start: 2018-11-15 | End: 2018-11-15 | Stop reason: HOSPADM

## 2018-11-15 RX ORDER — FENTANYL CITRATE 50 UG/ML
INJECTION, SOLUTION INTRAMUSCULAR; INTRAVENOUS PRN
Status: DISCONTINUED | OUTPATIENT
Start: 2018-11-15 | End: 2018-11-15

## 2018-11-15 RX ORDER — DEXAMETHASONE SODIUM PHOSPHATE 4 MG/ML
INJECTION, SOLUTION INTRA-ARTICULAR; INTRALESIONAL; INTRAMUSCULAR; INTRAVENOUS; SOFT TISSUE PRN
Status: DISCONTINUED | OUTPATIENT
Start: 2018-11-15 | End: 2018-11-15

## 2018-11-15 RX ORDER — SODIUM CHLORIDE, SODIUM LACTATE, POTASSIUM CHLORIDE, CALCIUM CHLORIDE 600; 310; 30; 20 MG/100ML; MG/100ML; MG/100ML; MG/100ML
INJECTION, SOLUTION INTRAVENOUS CONTINUOUS
Status: DISCONTINUED | OUTPATIENT
Start: 2018-11-15 | End: 2018-11-15 | Stop reason: HOSPADM

## 2018-11-15 RX ORDER — ONDANSETRON 2 MG/ML
INJECTION INTRAMUSCULAR; INTRAVENOUS PRN
Status: DISCONTINUED | OUTPATIENT
Start: 2018-11-15 | End: 2018-11-15

## 2018-11-15 RX ORDER — ACETAMINOPHEN 500 MG
1000 TABLET ORAL ONCE
Status: COMPLETED | OUTPATIENT
Start: 2018-11-15 | End: 2018-11-15

## 2018-11-15 RX ORDER — HYDROMORPHONE HYDROCHLORIDE 1 MG/ML
.3-.5 INJECTION, SOLUTION INTRAMUSCULAR; INTRAVENOUS; SUBCUTANEOUS EVERY 10 MIN PRN
Status: DISCONTINUED | OUTPATIENT
Start: 2018-11-15 | End: 2018-11-15 | Stop reason: HOSPADM

## 2018-11-15 RX ORDER — KETOROLAC TROMETHAMINE 30 MG/ML
30 INJECTION, SOLUTION INTRAMUSCULAR; INTRAVENOUS ONCE
Status: COMPLETED | OUTPATIENT
Start: 2018-11-15 | End: 2018-11-15

## 2018-11-15 RX ORDER — CEFAZOLIN SODIUM 1 G/3ML
1 INJECTION, POWDER, FOR SOLUTION INTRAMUSCULAR; INTRAVENOUS SEE ADMIN INSTRUCTIONS
Status: DISCONTINUED | OUTPATIENT
Start: 2018-11-15 | End: 2018-11-15 | Stop reason: HOSPADM

## 2018-11-15 RX ORDER — OXYCODONE HYDROCHLORIDE 5 MG/1
5-10 TABLET ORAL EVERY 4 HOURS PRN
Qty: 6 TABLET | Refills: 0 | Status: SHIPPED | OUTPATIENT
Start: 2018-11-15 | End: 2020-08-14

## 2018-11-15 RX ORDER — TOLTERODINE 4 MG/1
4 CAPSULE, EXTENDED RELEASE ORAL DAILY
Qty: 15 CAPSULE | Refills: 0 | Status: SHIPPED | OUTPATIENT
Start: 2018-11-15 | End: 2018-11-23

## 2018-11-15 RX ORDER — FENTANYL CITRATE 50 UG/ML
25-50 INJECTION, SOLUTION INTRAMUSCULAR; INTRAVENOUS
Status: DISCONTINUED | OUTPATIENT
Start: 2018-11-15 | End: 2018-11-15 | Stop reason: HOSPADM

## 2018-11-15 RX ORDER — ONDANSETRON 2 MG/ML
4 INJECTION INTRAMUSCULAR; INTRAVENOUS EVERY 30 MIN PRN
Status: DISCONTINUED | OUTPATIENT
Start: 2018-11-15 | End: 2018-11-15 | Stop reason: HOSPADM

## 2018-11-15 RX ORDER — PROPOFOL 10 MG/ML
INJECTION, EMULSION INTRAVENOUS CONTINUOUS PRN
Status: DISCONTINUED | OUTPATIENT
Start: 2018-11-15 | End: 2018-11-15

## 2018-11-15 RX ORDER — SCOLOPAMINE TRANSDERMAL SYSTEM 1 MG/1
1 PATCH, EXTENDED RELEASE TRANSDERMAL ONCE
Status: COMPLETED | OUTPATIENT
Start: 2018-11-15 | End: 2018-11-15

## 2018-11-15 RX ORDER — CEFAZOLIN SODIUM 2 G/100ML
2 INJECTION, SOLUTION INTRAVENOUS
Status: COMPLETED | OUTPATIENT
Start: 2018-11-15 | End: 2018-11-15

## 2018-11-15 RX ORDER — MAGNESIUM HYDROXIDE 1200 MG/15ML
LIQUID ORAL PRN
Status: DISCONTINUED | OUTPATIENT
Start: 2018-11-15 | End: 2018-11-15 | Stop reason: HOSPADM

## 2018-11-15 RX ORDER — GLYCOPYRROLATE 0.2 MG/ML
INJECTION, SOLUTION INTRAMUSCULAR; INTRAVENOUS PRN
Status: DISCONTINUED | OUTPATIENT
Start: 2018-11-15 | End: 2018-11-15

## 2018-11-15 RX ORDER — PROPOFOL 10 MG/ML
INJECTION, EMULSION INTRAVENOUS PRN
Status: DISCONTINUED | OUTPATIENT
Start: 2018-11-15 | End: 2018-11-15

## 2018-11-15 RX ORDER — LIDOCAINE 40 MG/G
CREAM TOPICAL
Status: DISCONTINUED | OUTPATIENT
Start: 2018-11-15 | End: 2018-11-15 | Stop reason: HOSPADM

## 2018-11-15 RX ORDER — MEPERIDINE HYDROCHLORIDE 25 MG/ML
12.5 INJECTION INTRAMUSCULAR; INTRAVENOUS; SUBCUTANEOUS
Status: DISCONTINUED | OUTPATIENT
Start: 2018-11-15 | End: 2018-11-15 | Stop reason: HOSPADM

## 2018-11-15 RX ORDER — LIDOCAINE HYDROCHLORIDE 20 MG/ML
INJECTION, SOLUTION INFILTRATION; PERINEURAL PRN
Status: DISCONTINUED | OUTPATIENT
Start: 2018-11-15 | End: 2018-11-15

## 2018-11-15 RX ADMIN — CEFAZOLIN SODIUM 2 G: 2 INJECTION, SOLUTION INTRAVENOUS at 14:50

## 2018-11-15 RX ADMIN — KETOROLAC TROMETHAMINE 30 MG: 30 INJECTION, SOLUTION INTRAMUSCULAR at 17:33

## 2018-11-15 RX ADMIN — Medication 0.5 MG: at 16:52

## 2018-11-15 RX ADMIN — PROPOFOL 100 MCG/KG/MIN: 10 INJECTION, EMULSION INTRAVENOUS at 14:41

## 2018-11-15 RX ADMIN — SODIUM CHLORIDE 3000 ML: 900 IRRIGANT IRRIGATION at 14:46

## 2018-11-15 RX ADMIN — SCOPALAMINE 1 PATCH: 1 PATCH, EXTENDED RELEASE TRANSDERMAL at 13:53

## 2018-11-15 RX ADMIN — ACETAMINOPHEN 1000 MG: 500 TABLET, FILM COATED ORAL at 13:53

## 2018-11-15 RX ADMIN — Medication 0.5 MG: at 17:04

## 2018-11-15 RX ADMIN — LIDOCAINE HYDROCHLORIDE 100 MG: 20 INJECTION, SOLUTION INFILTRATION; PERINEURAL at 14:41

## 2018-11-15 RX ADMIN — OXYCODONE HYDROCHLORIDE 5 MG: 5 TABLET ORAL at 17:29

## 2018-11-15 RX ADMIN — FENTANYL CITRATE 50 MCG: 50 INJECTION INTRAMUSCULAR; INTRAVENOUS at 16:42

## 2018-11-15 RX ADMIN — IOTHALAMATE MEGLUMINE 10 ML: 600 INJECTION INTRAVASCULAR at 16:04

## 2018-11-15 RX ADMIN — PROPOFOL 30 MG: 10 INJECTION, EMULSION INTRAVENOUS at 15:29

## 2018-11-15 RX ADMIN — FENTANYL CITRATE 50 MCG: 50 INJECTION INTRAMUSCULAR; INTRAVENOUS at 16:32

## 2018-11-15 RX ADMIN — PROPOFOL 200 MG: 10 INJECTION, EMULSION INTRAVENOUS at 14:41

## 2018-11-15 RX ADMIN — SODIUM CHLORIDE 1000 ML: 900 IRRIGANT IRRIGATION at 14:46

## 2018-11-15 RX ADMIN — FENTANYL CITRATE 50 MCG: 50 INJECTION, SOLUTION INTRAMUSCULAR; INTRAVENOUS at 14:41

## 2018-11-15 RX ADMIN — FENTANYL CITRATE 50 MCG: 50 INJECTION, SOLUTION INTRAMUSCULAR; INTRAVENOUS at 15:30

## 2018-11-15 RX ADMIN — GLYCOPYRROLATE 0.2 MG: 0.2 INJECTION, SOLUTION INTRAMUSCULAR; INTRAVENOUS at 14:49

## 2018-11-15 RX ADMIN — MIDAZOLAM 2 MG: 1 INJECTION INTRAMUSCULAR; INTRAVENOUS at 14:41

## 2018-11-15 RX ADMIN — ONDANSETRON 4 MG: 2 INJECTION INTRAMUSCULAR; INTRAVENOUS at 14:56

## 2018-11-15 RX ADMIN — DEXAMETHASONE SODIUM PHOSPHATE 4 MG: 4 INJECTION, SOLUTION INTRA-ARTICULAR; INTRALESIONAL; INTRAMUSCULAR; INTRAVENOUS; SOFT TISSUE at 14:56

## 2018-11-15 RX ADMIN — SODIUM CHLORIDE, POTASSIUM CHLORIDE, SODIUM LACTATE AND CALCIUM CHLORIDE: 600; 310; 30; 20 INJECTION, SOLUTION INTRAVENOUS at 16:10

## 2018-11-15 RX ADMIN — SODIUM CHLORIDE, POTASSIUM CHLORIDE, SODIUM LACTATE AND CALCIUM CHLORIDE: 600; 310; 30; 20 INJECTION, SOLUTION INTRAVENOUS at 14:36

## 2018-11-15 ASSESSMENT — ENCOUNTER SYMPTOMS
ORTHOPNEA: 0
SEIZURES: 0

## 2018-11-15 NOTE — OR NURSING
Assume patient care for Alia Jauregui at 4:21 PM.  Verbal report was  received from Laurie DORADO.

## 2018-11-15 NOTE — DISCHARGE INSTRUCTIONS
Cystoscopy, Holmium Laser with Stent Placement Discharge Instructions    Holmium laser lithotripsy was used to break up your kidney stone(s). It is normal to have visible blood in the urine, burning, urgency and frequency following this procedure. These symptoms may last for a few days to weeks.     Diet:    To help pass stone fragments and clear the blood out of the urine, it is important to drink 6-8 glasses of fluids per day at home - at least 3-4 glasses should be water.       Return to the diet that you were on before the procedure, unless you are given specific diet instructions.    Activity:    Walk short distances and increase as your strength allows.    You may climb stairs.    Do not do strenuous exercise or heavy lifting until approved by surgeon.    Do not drive while taking narcotic pain medications.    Bathing:    You may take a shower.          Stent information    During surgery, a stent was placed in the ureter.  The ureter is the tube that drains urine from the kidney to the bladder.  The stent is placed to dilate (open) the ureter so the stone fragments can pass easily through the ureter or to decrease ureteral swelling after surgery, or to relieve an obstruction. The stent is made of rubber. The upper end of the stent curls in the kidney while the lower end rests in the bladder.    While the stent is in place you may experience the following symptoms:    Blood and/or small blood clots in urine.    Bladder spasm (frequency and urgency of urination).    Discomfort or aching in the back or side where the stent is.    Burning or discomfort at the end of urine stream.    To decrease these symptoms you should:    Take pain medication as prescribed.    Drink plenty of fluids.    If you experience pain at the end of urination try not emptying your bladder completely.    If having discomfort in back or side, decrease activity.    Call your physician if these signs/symptoms are present:    Pain that is  not relieved by a short rest or ordered pain medications.    Temperature at or above 101.0 F or chills.    Inability or difficulty urinating.     Excessive blood in urine.    Any questions or concerns.        Same Day Surgery Discharge Instructions for  Sedation and General Anesthesia       It's not unusual to feel dizzy, light-headed or faint for up to 24 hours after surgery or while taking pain medication.  If you have these symptoms: sit for a few minutes before standing and have someone assist you when you get up to walk or use the bathroom.      You should rest and relax for the next 24 hours. We recommend you make arrangements to have an adult stay with you for at least 24 hours after your discharge.  Avoid hazardous and strenuous activity.      DO NOT DRIVE any vehicle or operate mechanical equipment for 24 hours following the end of your surgery.  Even though you may feel normal, your reactions may be affected by the medication you have received.      Do not drink alcoholic beverages for 24 hours following surgery.       Slowly progress to your regular diet as you feel able. It's not unusual to feel nauseated and/or vomit after receiving anesthesia.  If you develop these symptoms, drink clear liquids (apple juice, ginger ale, broth, 7-up, etc. ) until you feel better.  If your nausea and vomiting persists for 24 hours, please notify your surgeon.        All narcotic pain medications, along with inactivity and anesthesia, can cause constipation. Drinking plenty of liquids and increasing fiber intake will help.      For any questions of a medical nature, call your surgeon.      Do not make important decisions for 24 hours.      If you had general anesthesia, you may have a sore throat for a couple of days related to the breathing tube used during surgery.  You may use Cepacol lozenges to help with this discomfort.  If it worsens or if you develop a fever, contact your surgeon.       If you feel your pain is  not well managed with the pain medications prescribed by your surgeon, please contact your surgeon's office to let them know so they can address your concerns.           Information for Patients Discharging with a Transderm Scopolamine Patch       Dry mouth is a common side effect.    Drowsiness is another common side effect especially when combined with pain medication.  Please avoid activities that require mental alertness such as driving a car or making important legal decisions.    Since Scopolamine can cause temporary dilation of the pupils and blurred vision if it comes in contact with the eyes; be sure to wash your hands thoroughly with soap and water immediately after handling the patch.   When you remove your patch, please stick it to a tissue or paper towel for disposal.      Remove the patch immediately and contact a physician in the unlikely event that you experience symptoms of acute glaucoma (pain and reddening of the eyes, accompanied by dilated pupils).    Remove the patch if you develop any difficulties urinating.  If you cannot urinate after removing your patch, please notify your surgeon.    Remove the patch 24 hours after surgery.       **Because you had anesthesia today and your history of sleep apnea, it is extremely important that you use your CPAP machine for the next 24 hours while napping or sleeping.**        Today you were given 975 mg of Tylenol at 1:50p. The recommended daily maximum dose is 4000 mg.           While you were at the hospital today you received Toradol, an antiinflammatory medication similar to Ibuprofen.  You should not take other antiinflammatory medication, such as Ibuprofen, Motrin, Advil, Aleve, Naprosyn, etc, until 11:30  PM on 11/15/18.     **If you have questions or concerns about your procedure,   call Dr. Sellers [phone]**

## 2018-11-15 NOTE — IP AVS SNAPSHOT
MRN:[id number]                      After Visit Summary   11/15/2018    Alia Jauregui    MRN: [id number]           Thank you!     Thank you for choosing [facility] for your care. Our goal is always to provide you with excellent care. Hearing back from our patients is one way we can continue to improve our services. Please take a few minutes to complete the written survey that you may receive in the mail after you visit with us. Thank you!        Patient Information     Date Of Birth          1975        About your hospital stay     You were admitted on:  November 15, 2018 You last received care in the:  [facility] PACU    You were discharged on:  November 15, 2018       Who to Call     For medical emergencies, please call 911.  For non-urgent questions about your medical care, please call your primary care provider or clinic, [phone]  For questions related to your surgery, please call your surgery clinic        Attending Provider     Provider Lorna Morris MD Urology       Primary Care Provider Office Phone # Fax #    KayleyBelia Paige [phone]      After Care Instructions     Diet Instructions       Resume pre procedure diet            Discharge Instructions       Patient to arrange for follow up appointment in 1 weeks            Encourage fluids       Encourage fluids at home to keep urine clear to light pink            No Alcohol       for 24 hours post procedure            No driving or operating machinery        until the day after procedure                  Further instructions from your care team           Cystoscopy, Holmium Laser with Stent Placement Discharge Instructions    Holmium laser lithotripsy was used to break up your kidney stone(s). It is normal to have visible blood in the urine, burning, urgency and frequency following this procedure. These symptoms may last for a few days to weeks.     Diet:    To help pass stone  fragments and clear the blood out of the urine, it is important to drink 6-8 glasses of fluids per day at home - at least 3-4 glasses should be water.       Return to the diet that you were on before the procedure, unless you are given specific diet instructions.    Activity:    Walk short distances and increase as your strength allows.    You may climb stairs.    Do not do strenuous exercise or heavy lifting until approved by surgeon.    Do not drive while taking narcotic pain medications.    Bathing:    You may take a shower.          Stent information    During surgery, a stent was placed in the ureter.  The ureter is the tube that drains urine from the kidney to the bladder.  The stent is placed to dilate (open) the ureter so the stone fragments can pass easily through the ureter or to decrease ureteral swelling after surgery, or to relieve an obstruction. The stent is made of rubber. The upper end of the stent curls in the kidney while the lower end rests in the bladder.    While the stent is in place you may experience the following symptoms:    Blood and/or small blood clots in urine.    Bladder spasm (frequency and urgency of urination).    Discomfort or aching in the back or side where the stent is.    Burning or discomfort at the end of urine stream.    To decrease these symptoms you should:    Take pain medication as prescribed.    Drink plenty of fluids.    If you experience pain at the end of urination try not emptying your bladder completely.    If having discomfort in back or side, decrease activity.    Call your physician if these signs/symptoms are present:    Pain that is not relieved by a short rest or ordered pain medications.    Temperature at or above 101.0 F or chills.    Inability or difficulty urinating.     Excessive blood in urine.    Any questions or concerns.        Same Day Surgery Discharge Instructions for  Sedation and General Anesthesia       It's not unusual to feel dizzy,  light-headed or faint for up to 24 hours after surgery or while taking pain medication.  If you have these symptoms: sit for a few minutes before standing and have someone assist you when you get up to walk or use the bathroom.      You should rest and relax for the next 24 hours. We recommend you make arrangements to have an adult stay with you for at least 24 hours after your discharge.  Avoid hazardous and strenuous activity.      DO NOT DRIVE any vehicle or operate mechanical equipment for 24 hours following the end of your surgery.  Even though you may feel normal, your reactions may be affected by the medication you have received.      Do not drink alcoholic beverages for 24 hours following surgery.       Slowly progress to your regular diet as you feel able. It's not unusual to feel nauseated and/or vomit after receiving anesthesia.  If you develop these symptoms, drink clear liquids (apple juice, ginger ale, broth, 7-up, etc. ) until you feel better.  If your nausea and vomiting persists for 24 hours, please notify your surgeon.        All narcotic pain medications, along with inactivity and anesthesia, can cause constipation. Drinking plenty of liquids and increasing fiber intake will help.      For any questions of a medical nature, call your surgeon.      Do not make important decisions for 24 hours.      If you had general anesthesia, you may have a sore throat for a couple of days related to the breathing tube used during surgery.  You may use Cepacol lozenges to help with this discomfort.  If it worsens or if you develop a fever, contact your surgeon.       If you feel your pain is not well managed with the pain medications prescribed by your surgeon, please contact your surgeon's office to let them know so they can address your concerns.           Information for Patients Discharging with a Transderm Scopolamine Patch       Dry mouth is a common side effect.    Drowsiness is another common side effect  "especially when combined with pain medication.  Please avoid activities that require mental alertness such as driving a car or making important legal decisions.    Since Scopolamine can cause temporary dilation of the pupils and blurred vision if it comes in contact with the eyes; be sure to wash your hands thoroughly with soap and water immediately after handling the patch.   When you remove your patch, please stick it to a tissue or paper towel for disposal.      Remove the patch immediately and contact a physician in the unlikely event that you experience symptoms of acute glaucoma (pain and reddening of the eyes, accompanied by dilated pupils).    Remove the patch if you develop any difficulties urinating.  If you cannot urinate after removing your patch, please notify your surgeon.    Remove the patch 24 hours after surgery.       **Because you had anesthesia today and your history of sleep apnea, it is extremely important that you use your CPAP machine for the next 24 hours while napping or sleeping.**        Today you were given 975 mg of Tylenol at 1:50p. The recommended daily maximum dose is 4000 mg.           While you were at the hospital today you received Toradol, an antiinflammatory medication similar to Ibuprofen.  You should not take other antiinflammatory medication, such as Ibuprofen, Motrin, Advil, Aleve, Naprosyn, etc, until 11:30  PM on 11/15/18.     **If you have questions or concerns about your procedure,   call Dr. Sellers [phone]**              Pending Results     Date and Time Order Name Status Description    11/15/2018 1607 XR Surgery ADENIKE L/T 5 Min Fluoro w Stills In process             Admission Information     Date & Time Provider Department Dept. Phone    11/15/2018 Lorna Sellers MD [facility] PACU [phone]      Your Vitals Were     Blood Pressure Pulse Temperature Respirations Height Weight    131/85 70 96.6  F (35.9  C) (Temporal) 12 1.676 m (5' 6\") 81.1 " kg (178 lb 12.8 oz)    Last Period Pulse Oximetry BMI (Body Mass Index)             12/10/2014 94% 28.86 kg/m2         [employer] Information     [employer] gives you secure access to your electronic health record. If you see a primary care provider, you can also send messages to your care team and make appointments. If you have questions, please call your primary care clinic.  If you do not have a primary care provider, please call [phone] and they will assist you.        Care EveryWhere ID     This is your Care EveryWhere ID. This could be used by other organizations to access your [facility] medical records  [id number]        Equal Access to Services     [facility] : Ramona Parker, mere marr, jessenia lamas, petey copeland . So [facility] [phone].    ATENCIÓN: Si habla español, tiene a wilson disposición servicios gratuitos de asistencia lingüística. Llame al [phone].    We comply with applicable federal civil rights laws and [place] laws. We do not discriminate on the basis of race, color, national origin, age, disability, sex, sexual orientation, or gender identity.               Review of your medicines      START taking        Dose / Directions    oxyCODONE IR 5 MG tablet   Commonly known as:  ROXICODONE   Used for:  Renal calculi   Notes to Patient:  One pill given at 5:30 today 11/15        Dose:  5-10 mg   Take 1-2 tablets (5-10 mg) by mouth every 4 hours as needed for moderate to severe pain   Quantity:  6 tablet   Refills:  0       tolterodine 4 MG 24 hr capsule   Commonly known as:  DETROL LA   Used for:  Renal calculi        Dose:  4 mg   Take 1 capsule (4 mg) by mouth daily   Quantity:  15 capsule   Refills:  0         CONTINUE these medicines which have NOT CHANGED        Dose / Directions    acetaminophen 500 MG tablet   Commonly known as:  TYLENOL   Notes to Patient:  You received 1000 mg today at 4PM.  The max for 24 hrs is 4000mg         Dose:  1000 mg   Take 1,000 mg by mouth every 6 hours as needed for mild pain   Refills:  0       atenolol 50 MG tablet   Commonly known as:  TENORMIN   Used for:  Hypertension goal BP (blood pressure) < 140/90        TAKE ONE TABLET (50 MG) BY MOUTH ONCE DAILY   Quantity:  90 tablet   Refills:  2       docusate sodium 100 MG capsule   Commonly known as:  COLACE        Dose:  100 mg   Take 100 mg by mouth daily   Refills:  0       ibuprofen 200 MG tablet   Commonly known as:  ADVIL/MOTRIN   Notes to Patient:    While you were at the hospital today you received Toradol, an antiinflammatory medication similar to Ibuprofen.  You should not take other antiinflammatory medication, such as Ibuprofen, Motrin, Advil, Aleve, Naprosyn, etc, until 11:30  PM on 11/15/18.         Dose:  400 mg   Take 400 mg by mouth every 6 hours as needed   Refills:  0       loratadine 10 MG tablet   Commonly known as:  CLARITIN        Dose:  10 mg   Take 10 mg by mouth daily as needed   Refills:  0       order for DME   Used for:  TONY (obstructive sleep apnea)        Respironics Remstar 60 series auto 5-15 cm H2O, wisp s/m   Refills:  0         STOP taking     oxyCODONE-acetaminophen 5-325 MG per tablet   Commonly known as:  PERCOCET                Where to get your medicines      These medications were sent to [facility] - [address]  [address], [place] [address]     Phone:  [phone]     tolterodine 4 MG 24 hr capsule         Some of these will need a paper prescription and others can be bought over the counter. Ask your nurse if you have questions.     Bring a paper prescription for each of these medications     oxyCODONE IR 5 MG tablet                Protect others around you: Learn how to safely use, store and throw away your medicines at www.disposemymeds.org.        Information about OPIOIDS     PRESCRIPTION OPIOIDS: WHAT YOU NEED TO KNOW   We gave you an opioid (narcotic) pain  medicine. It is important to manage your pain, but opioids are not always the best choice. You should first try all the other options your care team gave you. Take this medicine for as short a time (and as few doses) as possible.    Some activities can increase your pain, such as bandage changes or therapy sessions. It may help to take your pain medicine 30 to 60 minutes before these activities. Reduce your stress by getting enough sleep, working on hobbies you enjoy and practicing relaxation or meditation. Talk to your care team about ways to manage your pain beyond prescription opioids.    These medicines have risks:    DO NOT drive when on new or higher doses of pain medicine. These medicines can affect your alertness and reaction times, and you could be arrested for driving under the influence (DUI). If you need to use opioids long-term, talk to your care team about driving.    DO NOT operate heavy machinery    DO NOT do any other dangerous activities while taking these medicines.    DO NOT drink any alcohol while taking these medicines.     If the opioid prescribed includes acetaminophen, DO NOT take with any other medicines that contain acetaminophen. Read all labels carefully. Look for the word  acetaminophen  or  Tylenol.  Ask your pharmacist if you have questions or are unsure.    You can get addicted to pain medicines, especially if you have a history of addiction (chemical, alcohol or substance dependence). Talk to your care team about ways to reduce this risk.    All opioids tend to cause constipation. Drink plenty of water and eat foods that have a lot of fiber, such as fruits, vegetables, prune juice, apple juice and high-fiber cereal. Take a laxative (Miralax, milk of magnesia, Colace, Senna) if you don t move your bowels at least every other day. Other side effects include upset stomach, sleepiness, dizziness, throwing up, tolerance (needing more of the medicine to have the same effect), physical  dependence and slowed breathing.    Store your pills in a secure place, locked if possible. We will not replace any lost or stolen medicine. If you don t finish your medicine, please throw away (dispose) as directed by your pharmacist. The [place] Pollution [employer] has more information about safe disposal: https://www.pca.state.[place]/living-green/managing-unwanted-medications             Medication List: This is a list of all your medications and when to take them. Check marks below indicate your daily home schedule. Keep this list as a reference.      Medications           Morning Afternoon Evening Bedtime As Needed    acetaminophen 500 MG tablet   Commonly known as:  TYLENOL   Take 1,000 mg by mouth every 6 hours as needed for mild pain   Last time this was given:  1,000 mg on 11/15/2018  1:53 PM   Notes to Patient:  You received 1000 mg today at 4PM.  The max for 24 hrs is 4000mg                                atenolol 50 MG tablet   Commonly known as:  TENORMIN   TAKE ONE TABLET (50 MG) BY MOUTH ONCE DAILY                                docusate sodium 100 MG capsule   Commonly known as:  COLACE   Take 100 mg by mouth daily                                ibuprofen 200 MG tablet   Commonly known as:  ADVIL/MOTRIN   Take 400 mg by mouth every 6 hours as needed   Notes to Patient:    While you were at the hospital today you received Toradol, an antiinflammatory medication similar to Ibuprofen.  You should not take other antiinflammatory medication, such as Ibuprofen, Motrin, Advil, Aleve, Naprosyn, etc, until 11:30  PM on 11/15/18.                                 loratadine 10 MG tablet   Commonly known as:  CLARITIN   Take 10 mg by mouth daily as needed                                [place] [facility]   Respironics Remstar 60 series auto 5-15 cm H2O, wisp s/m                                oxyCODONE IR 5 MG tablet   Commonly known as:  ROXICODONE   Take 1-2 tablets (5-10 mg) by mouth every 4 hours as needed  for moderate to severe pain   Last time this was given:  5 mg on 11/15/2018  5:29 PM   Notes to Patient:  One pill given at 5:30 today 11/15                                tolterodine 4 MG 24 hr capsule   Commonly known as:  DETROL LA   Take 1 capsule (4 mg) by mouth daily

## 2018-11-15 NOTE — ANESTHESIA PREPROCEDURE EVALUATION
Procedure: Procedure(s):  COMBINED CYSTOSCOPY, RIGHT URETEROSCOPY, LASER HOLMIUM LITHOTRIPSY URETER(S), INSERT RIGHT  STENT  Preop diagnosis: right renal stone   Allergies   Allergen Reactions     Nkda [No Known Drug Allergies]      Patient Active Problem List   Diagnosis     Polycystic ovaries     Hypertension goal BP (blood pressure) < 140/90     CARDIOVASCULAR SCREENING; LDL GOAL LESS THAN 160     Lactose intolerance     TONY (obstructive sleep apnea)     S/P hysterectomy     Past Medical History:   Diagnosis Date     Gestational diabetes      Incompetence of cervix 2008-cerclage placed, Dr. Quezada following pt with mfm      Preeclampsia      Past Surgical History:   Procedure Laterality Date      SECTION  2005    @ 24 weeks      SECTION      @ 29 weeks     D & C  3/4/08     LAPAROSCOPIC CHOLECYSTECTOMY WITH CHOLANGIOGRAMS N/A 2018    Procedure: LAPAROSCOPIC CHOLECYSTECTOMY WITH CHOLANGIOGRAMS;  Laparoscopic CHOLECYSTECTOMY;  Surgeon: Baltazar Pickering MD;  Location: [facility]     LAPAROSCOPIC HYSTERECTOMY TOTAL, BILATERAL SALPINGO-OOPHORECTOMY, COMBINED N/A 2014    tlh, bilateral salpingectomy       No current facility-administered medications on file prior to encounter.   Current Outpatient Prescriptions on File Prior to Encounter:  atenolol (TENORMIN) 50 MG tablet TAKE ONE TABLET (50 MG) BY MOUTH ONCE DAILY   docusate sodium (COLACE) 100 MG capsule Take 100 mg by mouth daily   oxyCODONE-acetaminophen (PERCOCET) 5-325 MG per tablet Take 1-2 tablets by mouth every 4 hours as needed for pain   acetaminophen (TYLENOL) 500 MG tablet Take 1,000 mg by mouth every 6 hours as needed for mild pain   ibuprofen (ADVIL,MOTRIN) 200 MG tablet Take 400 mg by mouth every 6 hours as needed    loratadine (CLARITIN) 10 MG tablet Take 10 mg by mouth daily as needed    ORDER FOR [facility] Respironics Remstar 60 series auto 5-15 cm H2O, wisp s/m     /65  Pulse 65  Temp 36.1  C (97  " F) (Temporal)  Resp 16  Ht 1.676 m (5' 6\")  Wt 81.1 kg (178 lb 12.8 oz)  LMP 12/10/2014  SpO2 100%  BMI 28.86 kg/m2    Lab Results   Component Value Date    WBC 8.9 11/10/2018     Lab Results   Component Value Date    RBC 4.49 11/10/2018     Lab Results   Component Value Date    HGB 14.7 11/10/2018     Lab Results   Component Value Date    HCT 41.7 11/10/2018     Lab Results   Component Value Date    MCV 93 11/10/2018     Lab Results   Component Value Date    MCH 32.7 11/10/2018     Lab Results   Component Value Date    MCHC 35.3 11/10/2018     Lab Results   Component Value Date    RDW 12.1 11/10/2018     Lab Results   Component Value Date     11/10/2018     No results found for: INR    Last Basic Metabolic Panel:  Lab Results   Component Value Date     11/10/2018      Lab Results   Component Value Date    POTASSIUM 4.0 11/10/2018     Lab Results   Component Value Date    CHLORIDE 107 11/10/2018     Lab Results   Component Value Date    ERICK 8.5 11/10/2018     Lab Results   Component Value Date    CO2 24 11/10/2018     Lab Results   Component Value Date    BUN 14 11/10/2018     Lab Results   Component Value Date    CR 0.71 11/10/2018     Lab Results   Component Value Date     11/10/2018       EKG - SR, nl axis     Anesthesia Evaluation     . Pt has had prior anesthetic. Type: General    History of anesthetic complications (slow to wake up)   - PONV        ROS/MED HX    ENT/Pulmonary:     (+)sleep apnea, doesn't use CPAP , . .   (-) recent URI   Neurologic:  - neg neurologic ROS    (-) seizures, CVA and migraines   Cardiovascular:     (+) hypertension----. : . . . :. .      (-) CAD, CHF and orthopnea/PND   METS/Exercise Tolerance:  >4 METS   Hematologic:  - neg hematologic  ROS       Musculoskeletal:  - neg musculoskeletal ROS       GI/Hepatic:  - neg GI/hepatic ROS      (-) GERD   Renal/Genitourinary:  - ROS Renal section negative       Endo: Comment: Hx of gestational DM - neg endo ROS  "   (-) Type II DM and thyroid disease   Psychiatric:  - neg psychiatric ROS       Infectious Disease:  - neg infectious disease ROS       Malignancy:      - no malignancy   Other: Comment: Hx of preeclampsia  Polycystic ovaries                    Physical Exam  Normal systems: cardiovascular, pulmonary and dental    Airway   Comment: Small mouth opening  Mild micrognathia    Dental     Cardiovascular   Rhythm and rate: regular and normal      Pulmonary    breath sounds clear to auscultation                    Anesthesia Plan      History & Physical Review  History and physical reviewed and following examination; no interval change.    ASA Status:  2 .    NPO Status:  > 8 hours    Plan for General and LMA with Propofol induction. Maintenance will be TIVA.    PONV prophylaxis:  Ondansetron (or other 5HT-3), Dexamethasone or Solumedrol and Scopolamine patch  Propofol infusion  Acetaminophen 1000 mg      Postoperative Care  Postoperative pain management:  IV analgesics and Oral pain medications.      Consents  Anesthetic plan, risks, benefits and alternatives discussed with:  Patient..                          .

## 2018-11-15 NOTE — IP AVS SNAPSHOT
[facility] [address]    [place] [address]    Phone:  [phone]                                       After Visit Summary   11/15/2018    Alia Jauregui    MRN: [id number]           After Visit Summary Signature Page     I have received my discharge instructions, and my questions have been answered. I have discussed any challenges I see with this plan with the nurse or doctor.    ..........................................................................................................................................  Patient/Patient Representative Signature      ..........................................................................................................................................  Patient Representative Print Name and Relationship to Patient    ..................................................               ................................................  Date                                   Time    ..........................................................................................................................................  Reviewed by Signature/Title    ...................................................              ..............................................  Date                                               Time          22EPIC Rev 08/18

## 2018-11-15 NOTE — ANESTHESIA POSTPROCEDURE EVALUATION
Patient: Alia Jauregui    Procedure(s):  COMBINED CYSTOSCOPY, RIGHT URETEROSCOPY, LASER HOLMIUM LITHOTRIPSY URETER(S), INSERT RIGHT  STENT    Diagnosis:right renal stone   Diagnosis Additional Information: No value filed.    Anesthesia Type:  General, LMA    Note:  Anesthesia Post Evaluation    Patient location during evaluation: PACU  Patient participation: Able to fully participate in evaluation  Level of consciousness: responsive to verbal stimuli  Pain management: adequate  Airway patency: patent  Cardiovascular status: acceptable  Respiratory status: acceptable  Hydration status: acceptable  PONV: none     Anesthetic complications: None          Last vitals:  Vitals:    11/15/18 1655 11/15/18 1700 11/15/18 1705   BP: (!) 144/99 133/84    Pulse:      Resp: 16 16 (P) 14   Temp:      SpO2: 100% 98%          Electronically Signed By: Iron Sutherland MD  November 15, 2018  5:07 PM

## 2018-11-15 NOTE — BRIEF OP NOTE
[facility]  Brief Operative Note    Pre-operative diagnosis: right renal stone    Post-operative diagnosis * No post-op diagnosis entered *   Procedure: Procedure(s):  COMBINED CYSTOSCOPY, RIGHT URETEROSCOPY, LASER HOLMIUM LITHOTRIPSY URETER(S), INSERT RIGHT  STENT   Surgeon: Lorna Sellers MD   Assistants(s): None    Anesthesia: General    Estimated blood loss: Minimal   Total IV fluids: (See anesthesia record)   Blood transfusion: (See anesthesia record)   Total urine output: (See anesthesia record)   Drains: 6 Fr x 26 cm stent    Specimens:   ID Type Source Tests Collected by Time Destination   1 : right kidney stones Calculus/Stone Kidney, Right STONE ANALYSIS Lorna Sellers MD 11/15/2018  3:04 PM       Findings: large renal pelvis stone, smaller renal stone    Complications: None   Implants: None

## 2018-11-15 NOTE — OP NOTE
OPERATIVE REPORT    PREOPERATIVE DIAGNOSIS:  Right -sided UPJ and renal stones    POSTOPERATIVE DIAGNOSIS:  Same    PROCEDURES PERFORMED:   1. Cystourethroscopy  2. Right ureteroscopy  3. Right  retrograde pyelogram with interpretation of intraoperative fluoroscopic imaging  4. Holmium laser lithotripsy with basket stone extraction  5. Right ureteral stent placement      STAFF SURGEON: Lorna Antonio MD   RESIDENT(S): none  ANESTHESIA: General    ESTIMATED BLOOD LOSS: 1 cc  DRAINS/TUBES: 6 Zambian x 24 cm double-J ureteral stent    IV FLUIDS: Please see dictated anesthesia record  COMPLICATIONS: None.   SPECIMEN: Stones for analysis  SIGNIFICANT FINDINGS:   1. Patient is stone free in right upper tract  2. Proximal curl of the ureteral stent seen in the renal pelvis under fluoroscopy and distal curl seen in the bladder under direct vision.     BRIEF OPERATIVE INDICATIONS:  Alia Jauregui is a(n) 43 year old female who presented with large UPJ calculus, 1cm, and a smaller renal calculus.  After a discussion of all risks, benefits, and alternatives, the patient elected to proceed with definitive stone management. The patient understands the potential need for more than one procedure to eliminate all stone burden.     DESCRIPTION OF PROCEDURE:  After informed consent was obtained, the patient was transported to the operating room & placed supine on the table. After adequate anesthesia was induced, the patient was placed in lithotomy and prepped and draped in the usual sterile fashion. A timeout was taken to confirm correct patient, procedure and laterality. Pre-operative IV antibiotics were administered.     A 22-Zambian rigid cystoscope was inserted into a well-lubricated urethra. The urethra was unremarkable without stricture. The ureteral orifice was identified and cannulated with a sensor wire with the aid of a 5 Zambian open-ended catheter. The wire passed without resistance into the upper pole.        Flexible URS  A dual lumen catheter was used to establish access with a second sensor wire which was clamped to the drape to act as our safety wire. A retrograde pyelogram was performed to serve as a roadmap. A 36 cm 12/14 ureteral access sheath was advanced up to the distal ureter. The flexible ureteroscope was used to identify the stone(s). A 200 micron laser fiber was used at a setting of 0.6 J and 6 Hz and lithotripsy was performed. A Halo basket was used to remove all fragments greater than 1 mm.  Pullback ureteroscopy was performed and showed no retained stone fragments or significant ureteral injury    A 6Fr x 24-cm double-J stent was advanced over the Sensor wire, and a good proximal curl was seen in the renal pelvis on fluoroscopy and the distal curl was seen in the bladder. The bladder was drained.    The patient tolerated the procedure well and there were no apparent complications. The patient  was transported to the postanesthesia care unit in stable condition.     POSTOP PLAN:  -Follow up in the office for stent removal in one week.

## 2018-11-16 LAB — COPATH REPORT: NORMAL

## 2018-11-16 NOTE — ADDENDUM NOTE
Addendum  created 11/16/18 0834 by Parul Carrillo APRN CRNA    Anesthesia Event edited, Sign clinical note

## 2018-11-16 NOTE — OR NURSING
Printed and verbal instructions were given to the patient and assigned care taker.  Patient and caretaker verbalized understanding discharge instructions and has no further question. No knowledge deficit noted. Prescribed medications ( including narcotic medication Oxycodone) was/were given to the care taker. Patient's belongings were returned to the patient and care taker. Patient was transferred to a wheel chair and was accompanied by RN to the hospital entrance door to be discharged to patient's home.

## 2018-11-16 NOTE — ANESTHESIA CARE TRANSFER NOTE
Patient: Alia Jauregui    Procedure(s):  COMBINED CYSTOSCOPY, RIGHT URETEROSCOPY, LASER HOLMIUM LITHOTRIPSY URETER(S), INSERT RIGHT  STENT    Diagnosis: right renal stone   Diagnosis Additional Information: No value filed.    Anesthesia Type:   General, LMA     Note:  Airway :Face Mask  Patient transferred to:PACU  Handoff Report: Identifed the Patient, Identified the Reponsible Provider, Reviewed the pertinent medical history, Discussed the surgical course, Reviewed Intra-OP anesthesia mangement and issues during anesthesia, Set expectations for post-procedure period and Allowed opportunity for questions and acknowledgement of understanding      Vitals: (Last set prior to Anesthesia Care Transfer)    CRNA VITALS  11/15/2018 1540 - 11/15/2018 1640      11/15/2018             Pulse: 81    SpO2: 100 %    Resp Rate (observed): (!)  2    Resp Rate (set): 10    EKG: Sinus rhythm                Electronically Signed By: Parul Carrillo CRNA, APRN CRNA  November 16, 2018  8:38 AM

## 2018-11-23 DIAGNOSIS — N20.0 RENAL CALCULI: ICD-10-CM

## 2018-11-23 LAB
APPEARANCE STONE: NORMAL
COMPN STONE: NORMAL
NUMBER STONE: NORMAL
SIZE STONE: NORMAL MM
WT STONE: 423 MG

## 2018-11-23 RX ORDER — TOLTERODINE 4 MG/1
4 CAPSULE, EXTENDED RELEASE ORAL DAILY
Qty: 15 CAPSULE | Refills: 0 | Status: SHIPPED | OUTPATIENT
Start: 2018-11-23 | End: 2020-08-14

## 2018-12-04 ENCOUNTER — OFFICE VISIT (OUTPATIENT)
Dept: UROLOGY | Facility: CLINIC | Age: 43
End: 2018-12-04
Payer: COMMERCIAL

## 2018-12-04 VITALS
SYSTOLIC BLOOD PRESSURE: 132 MMHG | WEIGHT: 180 LBS | OXYGEN SATURATION: 95 % | HEART RATE: 74 BPM | BODY MASS INDEX: 28.93 KG/M2 | HEIGHT: 66 IN | DIASTOLIC BLOOD PRESSURE: 86 MMHG

## 2018-12-04 DIAGNOSIS — N20.0 CALCULUS OF KIDNEY: ICD-10-CM

## 2018-12-04 DIAGNOSIS — Z79.2 PROPHYLACTIC ANTIBIOTIC: Primary | ICD-10-CM

## 2018-12-04 PROCEDURE — 52310 CYSTOSCOPY AND TREATMENT: CPT | Mod: 58 | Performed by: UROLOGY

## 2018-12-04 RX ORDER — OXYCODONE HYDROCHLORIDE 5 MG/1
5 TABLET ORAL EVERY 6 HOURS PRN
Qty: 12 TABLET | Refills: 0 | Status: SHIPPED | OUTPATIENT
Start: 2018-12-04 | End: 2020-08-14

## 2018-12-04 RX ORDER — CIPROFLOXACIN 500 MG/1
500 TABLET, FILM COATED ORAL ONCE
Qty: 1 TABLET | Refills: 0 | Status: SHIPPED | OUTPATIENT
Start: 2018-12-04 | End: 2018-12-04

## 2018-12-04 ASSESSMENT — PAIN SCALES - GENERAL: PAINLEVEL: NO PAIN (0)

## 2018-12-04 NOTE — MR AVS SNAPSHOT
"              After Visit Summary   12/4/2018    Alia Jauregui    MRN: [id number]           Patient Information     Date Of Birth          1975        Visit Information        Provider Department      12/4/2018 2:00 PM Lorna Sellers MD; [facility] [place]        Today's Diagnoses     Prophylactic antibiotic    -  1    Calculus of kidney          Care Instructions    AFTER YOUR CYSTOSCOPY  ?  ?  You have just completed a cystoscopy, or \"cysto\", which allowed your physician to learn more about your bladder (or to remove a stent placed after surgery). We suggest that you continue to avoid caffeine, fruit juice, and alcohol for the next 24 hours, however, you are encouraged to return to your normal activities.  ?  ?  A few things that are considered normal after your cystoscopy:  ?  * small amount of bleeding (or spotting) that clears within the next 24 hours  ?  * slight burning sensation with urination  ?  * sensation to of needing to avoid more frequently  ?  * the feeling of \"air\" in your urine  ?  * mild discomfort that is relieved with Tylonol    * bladder spasms  ?  ?  ?  Please contact our office promptly if you:  ?  * develop a fever above 101 degrees  ?  * are unable to urinate  ?  * develop bright red blood that does not stop  ?  * severe pain or swelling  ?  ?  ?  And of course, please contact our office with any concerns or questions [phone]  ?      AFTER YOUR CYSTOSCOPY        You have just completed a cystoscopy, or \"cysto\", which allowed your physician to learn more about your bladder (or to remove a stent placed after surgery). We suggest that you continue to avoid caffeine, fruit juice, and alcohol for the next 24 hours, however, you are encouraged to return to your normal activities.         A few things that are considered normal after your cystoscopy:     * Small amount of bleeding (or spotting) that clears within the next 24 " "hours     * Slight burning sensation with urination     * Sensation to of needing to avoid more frequently     * The feeling of \"air\" in your urine     * Mild discomfort that is relieved with Tylenol        Please contact our office promptly if you:     * Develop a fever above 101 degrees     * Are unable to urinate     * Develop bright red blood that does not stop     * Severe pain or swelling         Please contact our office with any concerns or questions @[facility].          Follow-ups after your visit        Follow-up notes from your care team     Return in 6 weeks (on 1/15/2019).      Your next 10 appointments already scheduled     Jan 22, 2019  2:30 PM CST   CT ABDOMEN PELVIS W/O CONTRAST with SHCT1   [facility] CT ([facility])    [address] [place] [id number] [phone]           How do I prepare for my exam? (Food and drink instructions) No Food and Drink Restrictions.  How do I prepare for my exam? (Other instructions) You do not need to do anything special to prepare for this exam. For a sinus scan: Use your nose spray (nasal decongestant spray) as directed.  What should I wear: Please wear loose clothing, such as a sweat suit or jogging clothes. Avoid snaps, zippers and other metal. We may ask you to undress and put on a hospital gown.  How long does the exam take: Most scans take less than 20 minutes.  What should I bring: Please bring any scans or X-rays taken at other hospitals, if similar tests were done. Also bring a list of your medicines, including vitamins, minerals and over-the-counter drugs. It is safest to leave personal items at home.  Do I need a : No  is needed.  What do I need to tell my doctor? Be sure to tell your doctor: * If you have any allergies. * If there s any chance you are pregnant. * If you are breastfeeding.  What should I do after the exam: No restrictions, you may resume normal activities.  What is this test: A CT " (computed tomography) scan is a series of pictures that allows us to look inside your body. The scanner creates images of the body in cross sections, much like slices of bread. This helps us see any problems more clearly.  Who should I call with questions: If you have any questions, please call the Imaging Department where you will have your exam. Directions, parking instructions, and other information are available on our website, [email or web address].            Jan 22, 2019  3:30 PM CST   Return Visit with Lorna Sellers MD   [facility] ([facility])    [address]  [address]  [place] [address]   [phone]              Future tests that were ordered for you today     Open Future Orders        Priority Expected Expires Ordered    CT Abdomen Pelvis w/o Contrast [FVJ190] Routine  12/4/2019 12/4/2018            Who to contact     If you have questions or need follow up information about today's clinic visit or your schedule please contact [facility] directly at [phone].  Normal or non-critical lab and imaging results will be communicated to you by [employer], letter or phone within 4 business days after the clinic has received the results. If you do not hear from us within 7 days, please contact the clinic through [employer] or phone. If you have a critical or abnormal lab result, we will notify you by phone as soon as possible.  Submit refill requests through [employer] or call your pharmacy and they will forward the refill request to us. Please allow 3 business days for your refill to be completed.          Additional Information About Your Visit        [employer] Benef[employer] Plans Information     [employer] gives you secure access to your electronic health record. If you see a primary care provider, you can also send messages to your care team and make appointments. If you have questions, please call your primary care clinic.   "If you do not have a primary care provider, please call [phone] and they will assist you.        Care EveryWhere ID     This is your Care EveryWhere ID. This could be used by other organizations to access your [facility] medical records  [id number]        Your Vitals Were     Pulse Height Last Period Pulse Oximetry BMI (Body Mass Index)       74 1.676 m (5' 6\") 12/10/2014 95% 29.05 kg/m2        Blood Pressure from Last 3 Encounters:   12/04/18 132/86   11/15/18 132/82   11/14/18 (!) 152/91    Weight from Last 3 Encounters:   12/04/18 81.6 kg (180 lb)   11/15/18 81.1 kg (178 lb 12.8 oz)   11/14/18 81.6 kg (179 lb 12.8 oz)              We Performed the Following     CYSTOURETHROSCOPY          Today's Medication Changes          These changes are accurate as of 12/4/18  2:29 PM.  If you have any questions, ask your nurse or doctor.               Start taking these medicines.        Dose/Directions    ciprofloxacin 500 MG tablet   Commonly known as:  CIPRO   Used for:  Prophylactic antibiotic   Started by:  Lorna Sellers MD        Dose:  500 mg   Take 1 tablet (500 mg) by mouth once for 1 dose   Quantity:  1 tablet   Refills:  0         These medicines have changed or have updated prescriptions.        Dose/Directions    * oxyCODONE 5 MG tablet   Commonly known as:  ROXICODONE   This may have changed:  Another medication with the same name was added. Make sure you understand how and when to take each.   Used for:  Renal calculi   Changed by:  Lorna Sellers MD        Dose:  5-10 mg   Take 1-2 tablets (5-10 mg) by mouth every 4 hours as needed for moderate to severe pain   Quantity:  6 tablet   Refills:  0       * oxyCODONE 5 MG tablet   Commonly known as:  ROXICODONE   This may have changed:  You were already taking a medication with the same name, and this prescription was added. Make sure you understand how and when to take each.   Used for:  Calculus of kidney   Changed by:  Lorna Sellers" MD Lynn        Dose:  5 mg   Take 1 tablet (5 mg) by mouth every 6 hours as needed for severe pain   Quantity:  12 tablet   Refills:  0       * Notice:  This list has 2 medication(s) that are the same as other medications prescribed for you. Read the directions carefully, and ask your doctor or other care provider to review them with you.         Where to get your medicines      These medications were sent to [facility] [place], [place] - [address]  [address], [place] [address]     Phone:  [phone]     ciprofloxacin 500 MG tablet         Some of these will need a paper prescription and others can be bought over the counter.  Ask your nurse if you have questions.     Bring a paper prescription for each of these medications     oxyCODONE 5 MG tablet               Information about OPIOIDS     PRESCRIPTION OPIOIDS: WHAT YOU NEED TO KNOW   We gave you an opioid (narcotic) pain medicine. It is important to manage your pain, but opioids are not always the best choice. You should first try all the other options your care team gave you. Take this medicine for as short a time (and as few doses) as possible.    Some activities can increase your pain, such as bandage changes or therapy sessions. It may help to take your pain medicine 30 to 60 minutes before these activities. Reduce your stress by getting enough sleep, working on hobbies you enjoy and practicing relaxation or meditation. Talk to your care team about ways to manage your pain beyond prescription opioids.    These medicines have risks:    DO NOT drive when on new or higher doses of pain medicine. These medicines can affect your alertness and reaction times, and you could be arrested for driving under the influence (DUI). If you need to use opioids long-term, talk to your care team about driving.    DO NOT operate heavy machinery    DO NOT do any other dangerous activities while taking these medicines.    DO NOT drink any  alcohol while taking these medicines.     If the opioid prescribed includes acetaminophen, DO NOT take with any other medicines that contain acetaminophen. Read all labels carefully. Look for the word  acetaminophen  or  Tylenol.  Ask your pharmacist if you have questions or are unsure.    You can get addicted to pain medicines, especially if you have a history of addiction (chemical, alcohol or substance dependence). Talk to your care team about ways to reduce this risk.    All opioids tend to cause constipation. Drink plenty of water and eat foods that have a lot of fiber, such as fruits, vegetables, prune juice, apple juice and high-fiber cereal. Take a laxative (Miralax, milk of magnesia, Colace, Senna) if you don t move your bowels at least every other day. Other side effects include upset stomach, sleepiness, dizziness, throwing up, tolerance (needing more of the medicine to have the same effect), physical dependence and slowed breathing.    Store your pills in a secure place, locked if possible. We will not replace any lost or stolen medicine. If you don t finish your medicine, please throw away (dispose) as directed by your pharmacist. The [place] Pollution Control Agency has more information about safe disposal: [email or web address]-medications         Primary Care Provider Office Phone # Fax #    Tariq Paige [phone] [address] [address]        Equal Access to Services     FELIPE WU : Ramona michel Sosrinivasa, waaxda luhussain, qaybta kaalmada adams, petey copeland . So [facility] [phone].    ATENCIÓN: Si habla español, tiene a wilson disposición servicios gratuitos de asistencia lingüística. Anthony al [phone].    We comply with applicable federal civil rights laws and [place] laws. We do not discriminate on the basis of race, color, national origin, age, disability, sex, sexual  orientation, or gender identity.            Thank you!     Thank you for choosing [facility] [place] [facility]  for your care. Our goal is always to provide you with excellent care. Hearing back from our patients is one way we can continue to improve our services. Please take a few minutes to complete the written survey that you may receive in the mail after your visit with us. Thank you!             Your Updated Medication List - Protect others around you: Learn how to safely use, store and throw away your medicines at www.disposemymeds.org.          This list is accurate as of 12/4/18  2:29 PM.  Always use your most recent med list.                   Brand Name Dispense Instructions for use Diagnosis    acetaminophen 500 MG tablet    TYLENOL     Take 1,000 mg by mouth every 6 hours as needed for mild pain        atenolol 50 MG tablet    TENORMIN    90 tablet    TAKE ONE TABLET (50 MG) BY MOUTH ONCE DAILY    Hypertension goal BP (blood pressure) < 140/90       ciprofloxacin 500 MG tablet    CIPRO    1 tablet    Take 1 tablet (500 mg) by mouth once for 1 dose    Prophylactic antibiotic       docusate sodium 100 MG capsule    COLACE     Take 100 mg by mouth daily        ibuprofen 200 MG tablet    ADVIL/MOTRIN     Take 400 mg by mouth every 6 hours as needed        loratadine 10 MG tablet    CLARITIN     Take 10 mg by mouth daily as needed        order for [facility]      Respironics Remstar 60 series auto 5-15 cm H2O, wisp s/m    TONY (obstructive sleep apnea)       * oxyCODONE 5 MG tablet    ROXICODONE    6 tablet    Take 1-2 tablets (5-10 mg) by mouth every 4 hours as needed for moderate to severe pain    Renal calculi       * oxyCODONE 5 MG tablet    ROXICODONE    12 tablet    Take 1 tablet (5 mg) by mouth every 6 hours as needed for severe pain    Calculus of kidney       tolterodine ER 4 MG 24 hr capsule    DETROL LA    15 capsule    Take 1 capsule (4 mg) by mouth daily    Renal calculi       *  Notice:  This list has 2 medication(s) that are the same as other medications prescribed for you. Read the directions carefully, and ask your doctor or other care provider to review them with you.

## 2018-12-04 NOTE — PATIENT INSTRUCTIONS
"AFTER YOUR CYSTOSCOPY  ?  ?  You have just completed a cystoscopy, or \"cysto\", which allowed your physician to learn more about your bladder (or to remove a stent placed after surgery). We suggest that you continue to avoid caffeine, fruit juice, and alcohol for the next 24 hours, however, you are encouraged to return to your normal activities.  ?  ?  A few things that are considered normal after your cystoscopy:  ?  * small amount of bleeding (or spotting) that clears within the next 24 hours  ?  * slight burning sensation with urination  ?  * sensation to of needing to avoid more frequently  ?  * the feeling of \"air\" in your urine  ?  * mild discomfort that is relieved with Tylonol    * bladder spasms  ?  ?  ?  Please contact our office promptly if you:  ?  * develop a fever above 101 degrees  ?  * are unable to urinate  ?  * develop bright red blood that does not stop  ?  * severe pain or swelling  ?  ?  ?  And of course, please contact our office with any concerns or questions [phone]  ?      AFTER YOUR CYSTOSCOPY        You have just completed a cystoscopy, or \"cysto\", which allowed your physician to learn more about your bladder (or to remove a stent placed after surgery). We suggest that you continue to avoid caffeine, fruit juice, and alcohol for the next 24 hours, however, you are encouraged to return to your normal activities.         A few things that are considered normal after your cystoscopy:     * Small amount of bleeding (or spotting) that clears within the next 24 hours     * Slight burning sensation with urination     * Sensation to of needing to avoid more frequently     * The feeling of \"air\" in your urine     * Mild discomfort that is relieved with Tylenol        Please contact our office promptly if you:     * Develop a fever above 101 degrees     * Are unable to urinate     * Develop bright red blood that does not stop     * Severe pain or swelling         Please contact our office with " any concerns or questions @[facility].

## 2018-12-04 NOTE — PROGRESS NOTES
ASSESSMENT AND PLAN  UPJ calculus     Will obtain CT scan abd/pelvis in six weeks   Rx for analgesia   24 hr urine x2   Follow up in six weeks     ____________________________________________________________    CHIEF COMPLAINT  It was my pleasure to see Alia Jauregui who is a 43 year old female who is here for follow-up for kidney stones and removal of her ureteral stent.    HPI   Patient with large obstructing UPJ stone. S/p ureteroscopy for clearance.     Patient Active Problem List    Diagnosis Date Noted     S/P hysterectomy 2014     Priority: Medium     Ovaries in       TONY (obstructive sleep apnea) 2014     Priority: Medium     Moderate TONY (2014 with AHI 18.8, shanita desat 89%)       Lactose intolerance 2011     Priority: Medium     CARDIOVASCULAR SCREENING; LDL GOAL LESS THAN 160 10/31/2010     Priority: Medium     Hypertension goal BP (blood pressure) < 140/90 2007     Priority: Medium     excellent control of bp - with some minimal LH - continue med, expect LH to improve.- already has. change from 25 mg tart bid to succ 25 mg daily.       Polycystic ovaries 2007     Priority: Medium     Past Medical History:   Diagnosis Date     Gestational diabetes 2008     Incompetence of cervix 2008-cerclage placed, Dr. Quezada following pt with mfm      Preeclampsia      Sleep apnea     HAS CPAP     Past Surgical History:   Procedure Laterality Date      SECTION  2005    @ 24 weeks      SECTION      @ 29 weeks     D & C  3/4/08     LAPAROSCOPIC CHOLECYSTECTOMY WITH CHOLANGIOGRAMS N/A 2018    Procedure: LAPAROSCOPIC CHOLECYSTECTOMY WITH CHOLANGIOGRAMS;  Laparoscopic CHOLECYSTECTOMY;  Surgeon: Baltazar Pickering MD;  Location: [facility]     LAPAROSCOPIC HYSTERECTOMY TOTAL, BILATERAL SALPINGO-OOPHORECTOMY, COMBINED N/A 2014    tlh, bilateral salpingectomy     LASER HOLMIUM LITHOTRIPSY URETER(S), INSERT STENT, COMBINED Right 11/15/2018     Procedure: COMBINED CYSTOSCOPY, RIGHT URETEROSCOPY, LASER HOLMIUM LITHOTRIPSY URETER(S), INSERT RIGHT  STENT;  Surgeon: Lorna Sellers MD;  Location:  OR     Current Outpatient Prescriptions   Medication Sig Dispense Refill     atenolol (TENORMIN) 50 MG tablet TAKE ONE TABLET (50 MG) BY MOUTH ONCE DAILY 90 tablet 2     ciprofloxacin (CIPRO) 500 MG tablet Take 1 tablet (500 mg) by mouth once for 1 dose 1 tablet 0     docusate sodium (COLACE) 100 MG capsule Take 100 mg by mouth daily       tolterodine (DETROL LA) 4 MG 24 hr capsule Take 1 capsule (4 mg) by mouth daily 15 capsule 0     acetaminophen (TYLENOL) 500 MG tablet Take 1,000 mg by mouth every 6 hours as needed for mild pain       ibuprofen (ADVIL,MOTRIN) 200 MG tablet Take 400 mg by mouth every 6 hours as needed        loratadine (CLARITIN) 10 MG tablet Take 10 mg by mouth daily as needed        ORDER FOR DME Respironics Remstar 60 series auto 5-15 cm H2O, wisp s/m       oxyCODONE IR (ROXICODONE) 5 MG tablet Take 1-2 tablets (5-10 mg) by mouth every 4 hours as needed for moderate to severe pain (Patient not taking: Reported on 12/4/2018) 6 tablet 0      SOCIAL HISTORY: She  reports that she has never smoked. She has never used smokeless tobacco. She reports that she drinks alcohol. She reports that she does not use illicit drugs.    CYSTOSCOPY PROCEDURE  After a sterile prep and drape and an informed consent a 16-French flexible cystoscope was introduced via the urethra.  The urethra was open.  The stent was visualized, grasped and removed in its entirety.  Antibiotic was administered per clinic protocol.     CC  Patient Care Team:  Tariq Paige MD as PCP - General (Internal Medicine)  Anna Rajan MD as PCP - Obstetrics/Gynecology

## 2018-12-04 NOTE — NURSING NOTE
Chief Complaint   Patient presents with     Cystoscopy     Pt here for stent removal     Prior to the start of the procedure and with procedural staff participation, I verbally confirmed the patient s identity using two indicators, relevant allergies, that the procedure was appropriate and matched the consent or emergent situation, and that the correct equipment/implants were available. Immediately prior to starting the procedure I conducted the Time Out with the procedural staff and re-confirmed the patient s name, procedure, and site/side. I have wiped the patient off with the povidone-Iodine solution, draped them,  used Lidocaine hydrochloride jelly, and instilled sterile water into the bladder. (The Joint Commission universal protocol was followed.)  Yes    Sedation (Moderate or Deep): None  Ashly Castro CMA

## 2018-12-04 NOTE — LETTER
2018       RE: Alia Jauregui  [address] [facility] [address]     Dear Colleague,    Thank you for referring your patient, Alia Jauregui, to the [facility]OLOG[facility] [place] at [facility]. Please see a copy of my visit note below.    ASSESSMENT AND PLAN  UPJ calculus     Will obtain CT scan abd/pelvis in six weeks   Rx for analgesia   24 hr urine x2   Follow up in six weeks     ____________________________________________________________    CHIEF COMPLAINT  It was my pleasure to see Alia Jauregui who is a 43 year old female who is here for follow-up for kidney stones and removal of her ureteral stent.    HPI   Patient with large obstructing UPJ stone. S/p ureteroscopy for clearance.     Patient Active Problem List    Diagnosis Date Noted     S/P hysterectomy 2014     Priority: Medium     Ovaries in       TONY (obstructive sleep apnea) 2014     Priority: Medium     Moderate TONY (2014 with AHI 18.8, shanita desat 89%)       Lactose intolerance 2011     Priority: Medium     CARDIOVASCULAR SCREENING; LDL GOAL LESS THAN 160 10/31/2010     Priority: Medium     Hypertension goal BP (blood pressure) < 140/90 2007     Priority: Medium     excellent control of bp - with some minimal LH - continue med, expect LH to improve.- already has. change from 25 mg tart bid to succ 25 mg daily.       Polycystic ovaries 2007     Priority: Medium     Past Medical History:   Diagnosis Date     Gestational diabetes      Incompetence of cervix 2008-cerclage placed, Dr. Quezada following pt with mfm      Preeclampsia      Sleep apnea     HAS CPAP     Past Surgical History:   Procedure Laterality Date      SECTION  2005    @ 24 weeks      SECTION      @ 29 weeks     D & C  3/4/08     LAPAROSCOPIC CHOLECYSTECTOMY WITH CHOLANGIOGRAMS N/A 2018    Procedure: LAPAROSCOPIC CHOLECYSTECTOMY  WITH CHOLANGIOGRAMS;  Laparoscopic CHOLECYSTECTOMY;  Surgeon: Baltazar Pickering MD;  Location: [facility]     LAPAROSCOPIC HYSTERECTOMY TOTAL, BILATERAL SALPINGO-OOPHORECTOMY, COMBINED N/A 12/24/2014    tlh, bilateral salpingectomy     LASER HOLMIUM LITHOTRIPSY URETER(S), INSERT STENT, COMBINED Right 11/15/2018    Procedure: COMBINED CYSTOSCOPY, RIGHT URETEROSCOPY, LASER HOLMIUM LITHOTRIPSY URETER(S), INSERT RIGHT  STENT;  Surgeon: Lorna Sellers MD;  Location:  OR     Current Outpatient Prescriptions   Medication Sig Dispense Refill     atenolol (TENORMIN) 50 MG tablet TAKE ONE TABLET (50 MG) BY MOUTH ONCE DAILY 90 tablet 2     ciprofloxacin (CIPRO) 500 MG tablet Take 1 tablet (500 mg) by mouth once for 1 dose 1 tablet 0     docusate sodium (COLACE) 100 MG capsule Take 100 mg by mouth daily       tolterodine (DETROL LA) 4 MG 24 hr capsule Take 1 capsule (4 mg) by mouth daily 15 capsule 0     acetaminophen (TYLENOL) 500 MG tablet Take 1,000 mg by mouth every 6 hours as needed for mild pain       ibuprofen (ADVIL,MOTRIN) 200 MG tablet Take 400 mg by mouth every 6 hours as needed        loratadine (CLARITIN) 10 MG tablet Take 10 mg by mouth daily as needed        ORDER FOR DME Respironics Remstar 60 series auto 5-15 cm H2O, wisp s/m       oxyCODONE IR (ROXICODONE) 5 MG tablet Take 1-2 tablets (5-10 mg) by mouth every 4 hours as needed for moderate to severe pain (Patient not taking: Reported on 12/4/2018) 6 tablet 0      SOCIAL HISTORY: She  reports that she has never smoked. She has never used smokeless tobacco. She reports that she drinks alcohol. She reports that she does not use illicit drugs.    CYSTOSCOPY PROCEDURE  After a sterile prep and drape and an informed consent a 16-French flexible cystoscope was introduced via the urethra.  The urethra was open.  The stent was visualized, grasped and removed in its entirety.  Antibiotic was administered per clinic protocol.     CC  Patient Care Team:  Tariq Paige  MD Belia as PCP - General (Internal Medicine)  Anna Rajan MD as PCP - Obstetrics/Gynecology    Again, thank you for allowing me to participate in the care of your patient.      Sincerely,    Lorna Sellers MD

## 2018-12-17 ENCOUNTER — TRANSFERRED RECORDS (OUTPATIENT)
Dept: HEALTH INFORMATION MANAGEMENT | Facility: CLINIC | Age: 43
End: 2018-12-17

## 2019-01-22 ENCOUNTER — HOSPITAL ENCOUNTER (OUTPATIENT)
Dept: CT IMAGING | Facility: CLINIC | Age: 44
Discharge: HOME | End: 2019-01-22
Attending: UROLOGY | Admitting: UROLOGY
Payer: COMMERCIAL

## 2019-01-22 ENCOUNTER — OFFICE VISIT (OUTPATIENT)
Dept: UROLOGY | Facility: CLINIC | Age: 44
End: 2019-01-22
Payer: COMMERCIAL

## 2019-01-22 VITALS
WEIGHT: 180 LBS | HEIGHT: 66 IN | DIASTOLIC BLOOD PRESSURE: 82 MMHG | BODY MASS INDEX: 28.93 KG/M2 | SYSTOLIC BLOOD PRESSURE: 120 MMHG

## 2019-01-22 DIAGNOSIS — N20.0 CALCULUS OF KIDNEY: ICD-10-CM

## 2019-01-22 DIAGNOSIS — N20.0 CALCULUS OF KIDNEY: Primary | ICD-10-CM

## 2019-01-22 PROCEDURE — 74176 CT ABD & PELVIS W/O CONTRAST: CPT

## 2019-01-22 PROCEDURE — 99213 OFFICE O/P EST LOW 20 MIN: CPT | Performed by: UROLOGY

## 2019-01-22 ASSESSMENT — PAIN SCALES - GENERAL: PAINLEVEL: NO PAIN (0)

## 2019-01-22 ASSESSMENT — MIFFLIN-ST. JEOR: SCORE: 1493.22

## 2019-01-22 NOTE — LETTER
RE: Alia Jauregui  [address] [facility] [address]     Dear Colleague,    Thank you for referring your patient, Alia Jauregui, to the [facility] UROLOGY CL[facility]IC [place] at [facility]. Please see a copy of my visit note below.    UROLOGIC DIAGNOSES:        CURRENT INTERVENTIONS:      History:      Patient presents after ED visit for right flank pain, nausea, and vomiting revealed a large renal pelvis stone (as well as a small renal pelvis stone) and a right upper pole renal calculus (small).   Patient noted symptoms ~ one week ago in [place] but these worsened upon return to [place].     Patient s/p ureteroscopy. CT scan confirmed stone clearance and no residual hydronephrosis.  24 hr urine x2 showed  Hypercalciuria   Mild hypocitraturia   Low urine volume       Imaging:      Labs:      MEDICATIONS:    Current Outpatient Medications:      acetaminophen (TYLENOL) 500 MG tablet, Take 1,000 mg by mouth every 6 hours as needed for mild pain, Disp: , Rfl:      atenolol (TENORMIN) 50 MG tablet, TAKE ONE TABLET (50 MG) BY MOUTH ONCE DAILY, Disp: 90 tablet, Rfl: 2     ibuprofen (ADVIL,MOTRIN) 200 MG tablet, Take 400 mg by mouth every 6 hours as needed , Disp: , Rfl:      loratadine (CLARITIN) 10 MG tablet, Take 10 mg by mouth daily as needed , Disp: , Rfl:      ORDER FOR DME, Respironics Remstar 60 series auto 5-15 cm H2O, wisp s/m, Disp: , Rfl:      tolterodine (DETROL LA) 4 MG 24 hr capsule, Take 1 capsule (4 mg) by mouth daily, Disp: 15 capsule, Rfl: 0     docusate sodium (COLACE) 100 MG capsule, Take 100 mg by mouth daily, Disp: , Rfl:      oxyCODONE (ROXICODONE) 5 MG tablet, Take 1 tablet (5 mg) by mouth every 6 hours as needed for severe pain (Patient not taking: Reported on 1/22/2019), Disp: 12 tablet, Rfl: 0     oxyCODONE IR (ROXICODONE) 5 MG tablet, Take 1-2 tablets (5-10 mg) by mouth every 4 hours as needed for moderate to severe pain (Patient not  "taking: Reported on 2018), Disp: 6 tablet, Rfl: 0    ALLERGIES:     Allergies   Allergen Reactions     Nkda [No Known Drug Allergies]        REVIEW OF SYSTEMS: Ten point review of systems without change as outlined in HPI    SURGICAL HISTORY:    Past Surgical History:   Procedure Laterality Date      SECTION  2005    @ 24 weeks      SECTION      @ 29 weeks     D & C  3/4/08     LAPAROSCOPIC CHOLECYSTECTOMY WITH CHOLANGIOGRAMS N/A 2018    Procedure: LAPAROSCOPIC CHOLECYSTECTOMY WITH CHOLANGIOGRAMS;  Laparoscopic CHOLECYSTECTOMY;  Surgeon: Baltazar Pickering MD;  Location: [facility]     LAPAROSCOPIC HYSTERECTOMY TOTAL, BILATERAL SALPINGO-OOPHORECTOMY, COMBINED N/A 2014    tlh, bilateral salpingectomy     LASER HOLMIUM LITHOTRIPSY URETER(S), INSERT STENT, COMBINED Right 11/15/2018    Procedure: COMBINED CYSTOSCOPY, RIGHT URETEROSCOPY, LASER HOLMIUM LITHOTRIPSY URETER(S), INSERT RIGHT  STENT;  Surgeon: Lorna Sellers MD;  Location:  OR         PHYSICAL EXAM:    /82 (BP Location: Left arm, Patient Position: Sitting, Cuff Size: Adult Regular)   Ht 1.676 m (5' 6\")   Wt 81.6 kg (180 lb)   LMP 12/10/2014   BMI 29.05 kg/m       HEENT: Normocephalic and atraumatic    Cardiac: Not done    Back/Flank: Not done    CNS/PNS: Alert and oriented    Respiratory: Normal nonlabored breathing    Abdomen: Soft nontender and nondistended    Peripheral Vascular: No peripheral edema    Mental Status: Normal    External Genitalia: Not done    Bladder: Not done    Urethra: Not done     Vagina: Not done    Cystoscopy:  Not Done      Urinalysis:  UA RESULTS:  Recent Labs   Lab Test 11/10/18  1410 12  0916   COLOR Yellow Yellow   APPEARANCE Clear Slightly Cloudy   URINEGLC Negative Negative   URINEBILI Negative Negative   URINEKETONE Negative Negative   SG 1.016 1.020   UBLD Negative Moderate*   URINEPH 5.0 6.5   PROTEIN Negative 30*   UROBILINOGEN  --  1.0   NITRITE Negative Positive* "   LEUKEST Moderate* Small*   RBCU 7* 10-25*   WBCU 49* 10-25*     IMPRESSION:    44 y/o F with recent history of UPJ calculus and renal calculi now cleared     PLAN:    Increase urine volume   Low sodium diet   Increase dietary citrate   Repeat 24 hr urine in three months   Follow up in three months     Total Time:  15 minutes   Time in Consultation: greater than 50%    Again, thank you for allowing me to participate in the care of your patient.      Sincerely,    Lorna Sellers MD

## 2019-01-23 NOTE — PROGRESS NOTES
UROLOGIC DIAGNOSES:        CURRENT INTERVENTIONS:        History:      Patient presents after ED visit for right flank pain, nausea, and vomiting revealed a large renal pelvis stone (as well as a small renal pelvis stone) and a right upper pole renal calculus (small).   Patient noted symptoms ~ one week ago in [place] but these worsened upon return to [place].     Patient s/p ureteroscopy. CT scan confirmed stone clearance and no residual hydronephrosis.  24 hr urine x2 showed  Hypercalciuria   Mild hypocitraturia   Low urine volume       Imaging:      Labs:      MEDICATIONS:    Current Outpatient Medications:      acetaminophen (TYLENOL) 500 MG tablet, Take 1,000 mg by mouth every 6 hours as needed for mild pain, Disp: , Rfl:      atenolol (TENORMIN) 50 MG tablet, TAKE ONE TABLET (50 MG) BY MOUTH ONCE DAILY, Disp: 90 tablet, Rfl: 2     ibuprofen (ADVIL,MOTRIN) 200 MG tablet, Take 400 mg by mouth every 6 hours as needed , Disp: , Rfl:      loratadine (CLARITIN) 10 MG tablet, Take 10 mg by mouth daily as needed , Disp: , Rfl:      ORDER FOR [facility] Respironics Remstar 60 series auto 5-15 cm H2O, wisp s/m, Disp: , Rfl:      tolterodine (DETROL LA) 4 MG 24 hr capsule, Take 1 capsule (4 mg) by mouth daily, Disp: 15 capsule, Rfl: 0     docusate sodium (COLACE) 100 MG capsule, Take 100 mg by mouth daily, Disp: , Rfl:      oxyCODONE (ROXICODONE) 5 MG tablet, Take 1 tablet (5 mg) by mouth every 6 hours as needed for severe pain (Patient not taking: Reported on 1/22/2019), Disp: 12 tablet, Rfl: 0     oxyCODONE IR (ROXICODONE) 5 MG tablet, Take 1-2 tablets (5-10 mg) by mouth every 4 hours as needed for moderate to severe pain (Patient not taking: Reported on 12/4/2018), Disp: 6 tablet, Rfl: 0    ALLERGIES:     Allergies   Allergen Reactions     Nkda [No Known Drug Allergies]        REVIEW OF SYSTEMS: Ten point review of systems without change as outlined in HPI    SURGICAL HISTORY:    Past Surgical History:   Procedure Laterality  "Date      SECTION  2005    @ 24 weeks      SECTION      @ 29 weeks     D & C  3/4/08     LAPAROSCOPIC CHOLECYSTECTOMY WITH CHOLANGIOGRAMS N/A 2018    Procedure: LAPAROSCOPIC CHOLECYSTECTOMY WITH CHOLANGIOGRAMS;  Laparoscopic CHOLECYSTECTOMY;  Surgeon: Baltazar Pickering MD;  Location: [facility]     LAPAROSCOPIC HYSTERECTOMY TOTAL, BILATERAL SALPINGO-OOPHORECTOMY, COMBINED N/A 2014    tlh, bilateral salpingectomy     LASER HOLMIUM LITHOTRIPSY URETER(S), INSERT STENT, COMBINED Right 11/15/2018    Procedure: COMBINED CYSTOSCOPY, RIGHT URETEROSCOPY, LASER HOLMIUM LITHOTRIPSY URETER(S), INSERT RIGHT  STENT;  Surgeon: Lorna Sellers MD;  Location:  OR         PHYSICAL EXAM:    /82 (BP Location: Left arm, Patient Position: Sitting, Cuff Size: Adult Regular)   Ht 1.676 m (5' 6\")   Wt 81.6 kg (180 lb)   LMP 12/10/2014   BMI 29.05 kg/m      HEENT: Normocephalic and atraumatic    Cardiac: Not done    Back/Flank: Not done    CNS/PNS: Alert and oriented    Respiratory: Normal nonlabored breathing    Abdomen: Soft nontender and nondistended    Peripheral Vascular: No peripheral edema    Mental Status: Normal    External Genitalia: Not done    Bladder: Not done    Urethra: Not done     Vagina: Not done    Cystoscopy:  Not Done      Urinalysis:  UA RESULTS:  Recent Labs   Lab Test 11/10/18  1410 12  0916   COLOR Yellow Yellow   APPEARANCE Clear Slightly Cloudy   URINEGLC Negative Negative   URINEBILI Negative Negative   URINEKETONE Negative Negative   SG 1.016 1.020   UBLD Negative Moderate*   URINEPH 5.0 6.5   PROTEIN Negative 30*   UROBILINOGEN  --  1.0   NITRITE Negative Positive*   LEUKEST Moderate* Small*   RBCU 7* 10-25*   WBCU 49* 10-25*         IMPRESSION:    42 y/o F with recent history of UPJ calculus and renal calculi now cleared     PLAN:    Increase urine volume   Low sodium diet   Increase dietary citrate   Repeat 24 hr urine in three months   Follow up in three " months     Total Time:  15 minutes   Time in Consultation: greater than 50%

## 2019-05-06 ENCOUNTER — TELEPHONE (OUTPATIENT)
Dept: FAMILY MEDICINE | Facility: CLINIC | Age: 44
End: 2019-05-06

## 2019-05-06 NOTE — TELEPHONE ENCOUNTER
5/6/2019    Attempt 1    Contacted patient in regards to scheduling VIP Mammogram, red  for may 15th    Message left with patient    Patient is also due for:     Outreach   SV   WILL CALL BACK ONCE SHE LOOKS AT SCHEDULE

## 2019-06-05 DIAGNOSIS — I10 HYPERTENSION GOAL BP (BLOOD PRESSURE) < 140/90: ICD-10-CM

## 2019-06-06 RX ORDER — ATENOLOL 50 MG/1
TABLET ORAL
Qty: 90 TABLET | Refills: 2 | Status: SHIPPED | OUTPATIENT
Start: 2019-06-06 | End: 2020-03-23

## 2019-06-06 NOTE — TELEPHONE ENCOUNTER
"Requested Prescriptions   Pending Prescriptions Disp Refills     atenolol (TENORMIN) 50 MG tablet [Pharmacy Med Name: ATENOLOL 50MG       TAB] 90 tablet 2     Sig: TAKE 1 TABLET BY MOUTH ONCE DAILY       Beta-Blockers Protocol Passed - 6/5/2019  9:07 PM        Passed - Blood pressure under 140/90 in past 12 months     BP Readings from Last 3 Encounters:   01/22/19 120/82   12/04/18 132/86   11/15/18 132/82                 Passed - Patient is age 6 or older        Passed - Recent (12 mo) or future (30 days) visit within the authorizing provider's specialty     Patient had office visit in the last 12 months or has a visit in the next 30 days with authorizing provider or within the authorizing provider's specialty.  See \"Patient Info\" tab in inbasket, or \"Choose Columns\" in Meds & Orders section of the refill encounter.              Passed - Medication is active on med list        Last Written Prescription Date:  5/1/18  Last Fill Quantity: 90,  # refills: 2   Last office visit: 11/12/2018 with prescribing provider:  Grecia   Future Office Visit:      "

## 2019-11-03 ENCOUNTER — HEALTH MAINTENANCE LETTER (OUTPATIENT)
Age: 44
End: 2019-11-03

## 2020-03-22 DIAGNOSIS — I10 HYPERTENSION GOAL BP (BLOOD PRESSURE) < 140/90: ICD-10-CM

## 2020-03-22 NOTE — LETTER
[facility]  [address] [address]  Phone: [phone]       March 23, 2020         Alia Jauregui  [address] [facility] [place] [address]            Dear Alia:    We are concerned about your health care.  We recently provided you with medication refills.  Many medications require routine follow-up with your doctor.    Your prescription(s) have been refilled for 90 days so you may have time for the above noted follow-up. Please call to schedule soon so we can assure you have an appointment before your next refills are needed.    Thank you,      Neda Paige MD / buddy

## 2020-03-23 RX ORDER — ATENOLOL 50 MG/1
TABLET ORAL
Qty: 90 TABLET | Refills: 0 | Status: SHIPPED | OUTPATIENT
Start: 2020-03-23 | End: 2020-08-14

## 2020-03-23 NOTE — TELEPHONE ENCOUNTER
One 90 day refill sent.  Should follow-up in clinic for BP check when pandemic has settled down.    Tariq Paige MD

## 2020-07-18 DIAGNOSIS — I10 HYPERTENSION GOAL BP (BLOOD PRESSURE) < 140/90: ICD-10-CM

## 2020-07-21 NOTE — TELEPHONE ENCOUNTER
Routing refill request to provider for review/approval because:  Patient needs to be seen because: Last seen 11/12/18.  Arielle.  Tyler

## 2020-07-21 NOTE — TELEPHONE ENCOUNTER
"Requested Prescriptions   Pending Prescriptions Disp Refills     atenolol (TENORMIN) 50 MG tablet [Pharmacy Med Name: Atenolol 50 MG Oral Tablet] 90 tablet 0     Sig: Take 1 tablet by mouth once daily       Beta-Blockers Protocol Failed - 7/18/2020  2:09 PM        Failed - Blood pressure under 140/90 in past 12 months     BP Readings from Last 3 Encounters:   01/22/19 120/82   12/04/18 132/86   11/15/18 132/82                 Failed - Recent (12 mo) or future (30 days) visit within the authorizing provider's specialty     Patient has had an office visit with the authorizing provider or a provider within the authorizing providers department within the previous 12 mos or has a future within next 30 days. See \"Patient Info\" tab in inbasket, or \"Choose Columns\" in Meds & Orders section of the refill encounter.              Passed - Patient is age 6 or older        Passed - Medication is active on med list             "

## 2020-07-22 RX ORDER — ATENOLOL 50 MG/1
TABLET ORAL
Qty: 90 TABLET | Refills: 0 | OUTPATIENT
Start: 2020-07-22

## 2020-07-22 NOTE — TELEPHONE ENCOUNTER
Patient contacted and told of the need for an appt for further refills then connected to appts. Grisel VERDUGO RN

## 2020-08-14 ENCOUNTER — OFFICE VISIT (OUTPATIENT)
Dept: FAMILY MEDICINE | Facility: CLINIC | Age: 45
End: 2020-08-14
Payer: COMMERCIAL

## 2020-08-14 VITALS
HEART RATE: 68 BPM | WEIGHT: 187.6 LBS | HEIGHT: 66 IN | DIASTOLIC BLOOD PRESSURE: 78 MMHG | SYSTOLIC BLOOD PRESSURE: 122 MMHG | OXYGEN SATURATION: 99 % | RESPIRATION RATE: 10 BRPM | TEMPERATURE: 97 F | BODY MASS INDEX: 30.15 KG/M2

## 2020-08-14 DIAGNOSIS — Z13.1 ENCOUNTER FOR SCREENING EXAMINATION FOR IMPAIRED GLUCOSE REGULATION AND DIABETES MELLITUS: ICD-10-CM

## 2020-08-14 DIAGNOSIS — R73.01 ELEVATED FASTING GLUCOSE: ICD-10-CM

## 2020-08-14 DIAGNOSIS — Z13.220 LIPID SCREENING: ICD-10-CM

## 2020-08-14 DIAGNOSIS — Z00.00 ROUTINE GENERAL MEDICAL EXAMINATION AT A HEALTH CARE FACILITY: Primary | ICD-10-CM

## 2020-08-14 DIAGNOSIS — I10 HTN, GOAL BELOW 130/80: ICD-10-CM

## 2020-08-14 LAB
CHOLEST SERPL-MCNC: 183 MG/DL
GLUCOSE SERPL-MCNC: 187 MG/DL (ref 70–99)
HDLC SERPL-MCNC: 36 MG/DL
LDLC SERPL CALC-MCNC: 87 MG/DL
NONHDLC SERPL-MCNC: 147 MG/DL
TRIGL SERPL-MCNC: 300 MG/DL

## 2020-08-14 PROCEDURE — 36415 COLL VENOUS BLD VENIPUNCTURE: CPT | Performed by: INTERNAL MEDICINE

## 2020-08-14 PROCEDURE — 80061 LIPID PANEL: CPT | Performed by: INTERNAL MEDICINE

## 2020-08-14 PROCEDURE — 99396 PREV VISIT EST AGE 40-64: CPT | Performed by: INTERNAL MEDICINE

## 2020-08-14 PROCEDURE — 82947 ASSAY GLUCOSE BLOOD QUANT: CPT | Performed by: INTERNAL MEDICINE

## 2020-08-14 PROCEDURE — 99213 OFFICE O/P EST LOW 20 MIN: CPT | Mod: 25 | Performed by: INTERNAL MEDICINE

## 2020-08-14 RX ORDER — ATENOLOL 50 MG/1
50 TABLET ORAL DAILY
Qty: 90 TABLET | Refills: 3 | Status: SHIPPED | OUTPATIENT
Start: 2020-08-14 | End: 2021-08-27

## 2020-08-14 ASSESSMENT — MIFFLIN-ST. JEOR: SCORE: 1515.57

## 2020-08-14 NOTE — NURSING NOTE
"Initial /78 (BP Location: Left arm, Patient Position: Sitting, Cuff Size: Adult Regular)   Pulse 68   Temp 97  F (36.1  C) (Tympanic)   Resp 10   Ht 1.665 m (5' 5.55\")   Wt 85.1 kg (187 lb 9.6 oz)   LMP 12/10/2014   SpO2 99%   BMI 30.70 kg/m   Estimated body mass index is 30.7 kg/m  as calculated from the following:    Height as of this encounter: 1.665 m (5' 5.55\").    Weight as of this encounter: 85.1 kg (187 lb 9.6 oz). .      "

## 2020-08-14 NOTE — PROGRESS NOTES
SUBJECTIVE:   CC: Alia Jauregui is an 44 year old woman who presents for preventive health visit.     Healthy Habits:    Getting at least 3 servings of Calcium per day:  Yes    Bi-annual eye exam:  NO    Dental care twice a year:  Yes    Sleep apnea or symptoms of sleep apnea:  None    Diet:  Regular (no restrictions), Low salt and Carbohydrate counting    Frequency of exercise:  2-3 days/week    Duration of exercise:  15-30 minutes    Taking medications regularly:  Yes    Barriers to taking medications:  None    Medication side effects:  None    PHQ-2 Total Score:    Additional concerns today:  No      Hypertension Follow-up      Do you check your blood pressure regularly outside of the clinic? No     Are you following a low salt diet? Yes    Are your blood pressures ever more than 140 on the top number (systolic) OR more   than 90 on the bottom number (diastolic), for example 140/90? No    Strong family history of diabetes and personal history of gestational diabetes.    Maternal aunt had breast cancer.      Today's PHQ-2 Score:   PHQ-2 ( 1999 Pf[employer]) 8/14/2020   Q1: Little interest or pleasure in doing things 0   Q2: Feeling down, depressed or hopeless 0   PHQ-2 Score 0       Abuse: Current or Past(Physical, Sexual or Emotional)- No  Do you feel safe in your environment? Yes        Social History     Tobacco Use     Smoking status: Never Smoker     Smokeless tobacco: Never Used   Substance Use Topics     Alcohol use: Yes     Comment: occassional      If you drink alcohol do you typically have >3 drinks per day or >7 drinks per week? No    Alcohol Use 8/14/2020   Prescreen: >3 drinks/day or >7 drinks/week? No       Reviewed orders with patient.  Reviewed health maintenance and updated orders accordingly - Yes  Patient Active Problem List   Diagnosis     Polycystic ovaries     Hypertension goal BP (blood pressure) < 140/90     CARDIOVASCULAR SCREENING; LDL GOAL LESS THAN 160     Lactose intolerance     TONY  (obstructive sleep apnea)     S/P hysterectomy     Past Surgical History:   Procedure Laterality Date      SECTION  2005    @ 24 weeks      SECTION      @ 29 weeks     D & C  3/4/08     LAPAROSCOPIC CHOLECYSTECTOMY WITH CHOLANGIOGRAMS N/A 2018    Procedure: LAPAROSCOPIC CHOLECYSTECTOMY WITH CHOLANGIOGRAMS;  Laparoscopic CHOLECYSTECTOMY;  Surgeon: Baltazar Pickering MD;  Location: [facility]     LAPAROSCOPIC HYSTERECTOMY TOTAL, BILATERAL SALPINGO-OOPHORECTOMY, COMBINED N/A 2014    tlh, bilateral salpingectomy     LASER HOLMIUM LITHOTRIPSY URETER(S), INSERT STENT, COMBINED Right 11/15/2018    Procedure: COMBINED CYSTOSCOPY, RIGHT URETEROSCOPY, LASER HOLMIUM LITHOTRIPSY URETER(S), INSERT RIGHT  STENT;  Surgeon: Lorna Sellers MD;  Location:  OR       Social History     Tobacco Use     Smoking status: Never Smoker     Smokeless tobacco: Never Used   Substance Use Topics     Alcohol use: Yes     Comment: occassional      Family History   Problem Relation Age of Onset     Diabetes Maternal Grandmother      Heart Disease Maternal Grandmother         Open heart surgery, pacemaker     Circulatory Maternal Grandmother         Blood clot     Hypertension Maternal Grandmother         TONY     Thyroid Disease Maternal Grandmother         hypothyroidism     Heart Disease Paternal Grandfather      Hypertension Paternal Grandfather      Alcohol/Drug Paternal Grandfather         recovering     Hypertension Mother      Gynecology Mother         fibroids     Diabetes Mother         boarderline     Hypertension Father      Alcohol/Drug Maternal Grandfather         ETOH abuse     Eye Disorder Son         near sided     Thyroid Disease Son         hypothyroidism/hypothyroidism     Autism Spectrum Disorder Son         Dx 10/2015     Depression Son 11     Anxiety Disorder Son      Gynecology Sister         Fibroids     Eye Disorder Daughter         far sided     Thyroid Disease Daughter          hypothyroidism     Endocrine Disease Daughter         Growth delay, starting shots 8/2016     Ear Disorder Daughter         hearing aid in left ear     Cancer - colorectal Paternal Uncle         in his 40's, also lung     Other - See Comments Daughter 2        snored         Current Outpatient Medications   Medication Sig Dispense Refill     acetaminophen (TYLENOL) 500 MG tablet Take 1,000 mg by mouth every 6 hours as needed for mild pain       atenolol (TENORMIN) 50 MG tablet Take 1 tablet by mouth once daily 90 tablet 0     ibuprofen (ADVIL,MOTRIN) 200 MG tablet Take 400 mg by mouth every 6 hours as needed        loratadine (CLARITIN) 10 MG tablet Take 10 mg by mouth daily as needed        docusate sodium (COLACE) 100 MG capsule Take 100 mg by mouth daily       ORDER FOR [facility] Respironics Remstar 60 series auto 5-15 cm H2O, wisp s/m       oxyCODONE (ROXICODONE) 5 MG tablet Take 1 tablet (5 mg) by mouth every 6 hours as needed for severe pain (Patient not taking: Reported on 1/22/2019) 12 tablet 0     oxyCODONE IR (ROXICODONE) 5 MG tablet Take 1-2 tablets (5-10 mg) by mouth every 4 hours as needed for moderate to severe pain (Patient not taking: Reported on 12/4/2018) 6 tablet 0     tolterodine (DETROL LA) 4 MG 24 hr capsule Take 1 capsule (4 mg) by mouth daily (Patient not taking: Reported on 8/14/2020) 15 capsule 0     Allergies   Allergen Reactions     Nkda [No Known Drug Allergies]        Mammogram Screening: Patient under age 50, mutual decision reflected in health maintenance.      Pertinent mammograms are reviewed under the imaging tab.  History of abnormal Pap smear: Status post benign hysterectomy. Health Maintenance and Surgical History updated.  PAP / HPV 2/12/2014 12/14/2011 6/17/2008   PAP NIL NIL NIL     Reviewed and updated as needed this visit by clinical staff  Tobacco  Allergies  Meds         Reviewed and updated as needed this visit by Provider            Review of Systems    10 point ROS of  "systems including Constitutional, Eyes, Respiratory, Cardiovascular, Gastroenterology, Genitourinary, Integumentary, Muscularskeletal, Psychiatric were all negative except for pertinent positives noted in my HPI.      OBJECTIVE:   /78 (BP Location: Left arm, Patient Position: Sitting, Cuff Size: Adult Regular)   Pulse 68   Temp 97  F (36.1  C) (Tympanic)   Resp 10   Ht 1.665 m (5' 5.55\")   Wt 85.1 kg (187 lb 9.6 oz)   LMP 12/10/2014   SpO2 99%   BMI 30.70 kg/m    Physical Exam  GENERAL: healthy, alert and no distress  EYES: Eyes grossly normal to inspection, PERRL and conjunctivae and sclerae normal  HENT: ear canals and TM's normal, nose and mouth without ulcers or lesions  NECK: no adenopathy, no asymmetry, masses, or scars and thyroid normal to palpation  RESP: lungs clear to auscultation - no rales, rhonchi or wheezes  BREAST: normal without masses, tenderness or nipple discharge and no palpable axillary masses or adenopathy  CV: regular rate and rhythm, normal S1 S2, no S3 or S4, no murmur, click or rub, no peripheral edema  ABDOMEN: soft, nontender, no hepatosplenomegaly, no masses and bowel sounds normal  MS: no gross musculoskeletal defects noted, no edema  SKIN: no suspicious lesions or rashes  NEURO: Normal strength and tone, mentation intact and speech normal  PSYCH: mentation appears normal, affect normal/bright      ASSESSMENT/PLAN:   1. Routine general medical examination at a health care facility      Pap smear: no longer indicated s/p hysterectomy    Immunizations: UTD    Lab Studies: as below    Mammogram: discussed starting screening at 40 vs 50 and she wishes to wait until 51       2. HTN, goal below 130/80    Controlled, refill provided    - atenolol (TENORMIN) 50 MG tablet; Take 1 tablet (50 mg) by mouth daily  Dispense: 90 tablet; Refill: 3    3. Encounter for screening examination for impaired glucose regulation and diabetes mellitus    She has history of gestational diabetes, " "had slightly high blood sugar a couple of years ago- likely was not fasting.  We will check fasting glucose today    - Glucose    4. Lipid screening    - Lipid panel reflex to direct LDL Fasting    COUNSELING:  Reviewed preventive health counseling, as reflected in patient instructions    Estimated body mass index is 30.7 kg/m  as calculated from the following:    Height as of this encounter: 1.665 m (5' 5.55\").    Weight as of this encounter: 85.1 kg (187 lb 9.6 oz).    Weight management plan: Discussed healthy diet and exercise guidelines     reports that she has never smoked. She has never used smokeless tobacco.        Tariq Paige MD  [facility]  "

## 2020-08-14 NOTE — PROGRESS NOTES
"Your blood sugar is in the diabetes range- please schedule another blood draw to have an A1C checked to confirm this.      Your total cholesterol level is good, your LDL \"bad\" cholesterol level is good, your HDL \"good\" cholesterol level is too low, and your triglycerides are high.  These levels are not bad enough to require medication, but continue to focus on making healthy diet choices by focusing on a low carb and low sugar diet and increasing your exercise levels to improve your HDL and triglycerides (and blood sugar).       The 10-year ASCVD risk score (Carterwilliam SPEARS Jr., et al., 2013) is: 1.5%    Values used to calculate the score:      Age: 44 years      Sex: Female      Is Non- : No      Diabetic: No      Tobacco smoker: No      Systolic Blood Pressure: 122 mmHg      Is BP treated: Yes      HDL Cholesterol: 36 mg/dL      Total Cholesterol: 183 mg/dL     Tariq Paige MD   "

## 2020-09-03 DIAGNOSIS — R73.01 ELEVATED FASTING GLUCOSE: ICD-10-CM

## 2020-09-03 DIAGNOSIS — E78.1 HYPERTRIGLYCERIDEMIA: ICD-10-CM

## 2020-09-03 LAB
HBA1C MFR BLD: 7.4 % (ref 0–5.6)
TSH SERPL DL<=0.005 MIU/L-ACNC: 1.53 MU/L (ref 0.4–4)

## 2020-09-03 PROCEDURE — 84443 ASSAY THYROID STIM HORMONE: CPT | Performed by: INTERNAL MEDICINE

## 2020-09-03 PROCEDURE — 36415 COLL VENOUS BLD VENIPUNCTURE: CPT | Performed by: INTERNAL MEDICINE

## 2020-09-03 PROCEDURE — 83036 HEMOGLOBIN GLYCOSYLATED A1C: CPT | Performed by: INTERNAL MEDICINE

## 2020-09-15 ENCOUNTER — OFFICE VISIT (OUTPATIENT)
Dept: FAMILY MEDICINE | Facility: CLINIC | Age: 45
End: 2020-09-15
Payer: COMMERCIAL

## 2020-09-15 VITALS
SYSTOLIC BLOOD PRESSURE: 116 MMHG | OXYGEN SATURATION: 98 % | BODY MASS INDEX: 30.31 KG/M2 | TEMPERATURE: 98.6 F | DIASTOLIC BLOOD PRESSURE: 80 MMHG | RESPIRATION RATE: 16 BRPM | WEIGHT: 188.6 LBS | HEART RATE: 67 BPM | HEIGHT: 66 IN

## 2020-09-15 DIAGNOSIS — E11.9 TYPE 2 DIABETES MELLITUS WITHOUT COMPLICATION, WITHOUT LONG-TERM CURRENT USE OF INSULIN (H): Primary | ICD-10-CM

## 2020-09-15 DIAGNOSIS — Z23 NEED FOR PROPHYLACTIC VACCINATION AND INOCULATION AGAINST INFLUENZA: ICD-10-CM

## 2020-09-15 DIAGNOSIS — Z23 NEED FOR VACCINATION: ICD-10-CM

## 2020-09-15 LAB
CREAT UR-MCNC: 115 MG/DL
MICROALBUMIN UR-MCNC: 6 MG/L
MICROALBUMIN/CREAT UR: 5.3 MG/G CR (ref 0–25)

## 2020-09-15 PROCEDURE — 90472 IMMUNIZATION ADMIN EACH ADD: CPT | Performed by: INTERNAL MEDICINE

## 2020-09-15 PROCEDURE — 90471 IMMUNIZATION ADMIN: CPT | Performed by: INTERNAL MEDICINE

## 2020-09-15 PROCEDURE — 90686 IIV4 VACC NO PRSV 0.5 ML IM: CPT | Performed by: INTERNAL MEDICINE

## 2020-09-15 PROCEDURE — 82043 UR ALBUMIN QUANTITATIVE: CPT | Performed by: INTERNAL MEDICINE

## 2020-09-15 PROCEDURE — 99214 OFFICE O/P EST MOD 30 MIN: CPT | Mod: 25 | Performed by: INTERNAL MEDICINE

## 2020-09-15 PROCEDURE — 90732 PPSV23 VACC 2 YRS+ SUBQ/IM: CPT | Performed by: INTERNAL MEDICINE

## 2020-09-15 RX ORDER — LANCETS
EACH MISCELLANEOUS
Qty: 100 EACH | Refills: 6 | Status: SHIPPED | OUTPATIENT
Start: 2020-09-15 | End: 2022-10-21

## 2020-09-15 RX ORDER — GLUCOSAMINE HCL/CHONDROITIN SU 500-400 MG
CAPSULE ORAL
Qty: 100 EACH | Refills: 3 | Status: SHIPPED | OUTPATIENT
Start: 2020-09-15 | End: 2021-12-01

## 2020-09-15 RX ORDER — ATORVASTATIN CALCIUM 20 MG/1
20 TABLET, FILM COATED ORAL DAILY
Qty: 90 TABLET | Refills: 3 | Status: SHIPPED | OUTPATIENT
Start: 2020-09-15 | End: 2021-10-04

## 2020-09-15 ASSESSMENT — MIFFLIN-ST. JEOR: SCORE: 1515.1

## 2020-09-15 NOTE — PROGRESS NOTES
"Subjective     Alia Jauregui is a 45 year old female who presents to clinic today for the following health issues:    Chief Complaint   Patient presents with     Diabetes     Is here to discuss new onset of type 2 Diabetes      Imm/Inj     Flu Shot         HPI     A1C was recently found to be 7.4.    Maternal grandmother had diabetes.  Mother and aunts are pre-diabetic.      Alia reports today she has already started reducing her carb intake.  She did have gestational diabetes so she is somewhat familiar with the diet, though she was hospitalized at that time so it was easy to follow the diet with the menu provider.        Review of Systems   Constitutional, endo systems are negative, except as otherwise noted.      Objective    /80 (BP Location: Right arm, Patient Position: Sitting, Cuff Size: Adult Large)   Pulse 67   Temp 98.6  F (37  C) (Tympanic)   Resp 16   Ht 1.665 m (5' 5.55\")   Wt 85.5 kg (188 lb 9.6 oz)   LMP 12/10/2014   SpO2 98%   BMI 30.86 kg/m    Body mass index is 30.86 kg/m .  Physical Exam   GENERAL: healthy, alert and no distress    Results for orders placed or performed in visit on 09/15/20 (from the past 24 hour(s))   Albumin Random Urine Quantitative with Creat Ratio   Result Value Ref Range    Creatinine Urine 115 mg/dL    Albumin Urine mg/L 6 mg/L    Albumin Urine mg/g Cr 5.30 0 - 25 mg/g Cr           Assessment & Plan     Type 2 diabetes mellitus without complication, without long-term current use of insulin (H)    Diagnosis of diabetes discussed; she is somewhat familiar as she had gestational diabetes, but she was hospitalized for the entire duration of that, so wasn't very engaged in self management.  Prescription sent for glucometer and supplies and she will start testing daily.  A1C is 7.4.  Discussed a goal of <7 is probably ideal given her younger age, but this should be achievable with lifestyle changes.  She is interested in seeing diabetic ed to discuss diet in " more detail.  We'll recheck A1C in 3 months and check BMP at that time too.  Microalbumin normal today.  Foot exam deferred to next visit.  Recommended annual eye exam.  Starting statin to reduced ASCVD risk given the diabetes.      - atorvastatin (LIPITOR) 20 MG tablet; Take 1 tablet (20 mg) by mouth daily  - blood glucose monitoring (NO BRAND SPECIFIED) meter device kit; Use to test blood sugar 1 times daily or as directed. Blood Glucose Monitor Brands: per insurance.  - blood glucose (NO BRAND SPECIFIED) test strip; Use to test blood sugar 1 times daily or as directed. To accompany: Blood Glucose Monitor Brands: per insurance.  - blood glucose calibration (NO BRAND SPECIFIED) solution; To accompany: Blood Glucose Monitor Brands: per insurance.  - thin (NO BRAND SPECIFIED) lancets; Use with lanceting device. To accompany: Blood Glucose Monitor Brands: per insurance.  - alcohol swab prep pads; Use to swab area of injection/fernando as directed.  - AMBULATORY ADULT DIABETES EDUCATOR REFERRAL; Future  - **A1C FUTURE 3mo; Future  - Albumin Random Urine Quantitative with Creat Ratio  - Basic metabolic panel; Future    Need for prophylactic vaccination and inoculation against influenza    - INFLUENZA VACCINE IM > 6 MONTHS VALENT IIV4 [96016]  - Vaccine Administration, Initial [97658]    Need for vaccination    - Pneumococcal vaccine 23 valent PPSV23  (Pneumovax) [25098]  - Each additional admin.  (Right click and add QUANTITY)  [75479]       Return in about 3 months (around 12/15/2020) for Lab Work, In-Clinic Visit.    Tariq Paige MD  [facility]

## 2020-09-15 NOTE — PATIENT INSTRUCTIONS
Patient Education     What Is Diabetes?  If you have diabetes, your body does not make enough insulin (a hormone), or the insulin it makes does not work the way it should. Diabetes is a lifelong disease.  Glucose (sugar) is your body's main source of energy. Insulin carries glucose from the bloodstream into your body's cells. But if you have diabetes, glucose builds up in your blood. This is called high blood glucose (high blood sugar).  High blood glucose can lead to damage in many parts of your body, including your eyes, kidneys, heart, blood vessels, nerves and skin.  What are the symptoms of diabetes?  Symptoms include:    Extreme thirst    Needing to urinate more often    Headache    Hunger    Blurred vision    Feeling drowsy or tired    Slow healing after an illness or injury    Frequent infections.  What should I do if I have diabetes?  Your first step is to learn how to manage your diabetes. The goal is to keep your blood glucose as close to normal as possible. (A normal level is 70 to 100.) By doing this, you can prevent or control damage to your body.  To manage your diabetes, you will need to:    Eat a wide range of healthy foods.    Manage your weight.    Be physically active.    Test your blood glucose as prescribed.    Control your blood pressure and cholesterol.    Take your medicines as prescribed.  Are there different kinds of diabetes?  There are two basic kinds of diabetes: type 1 and type 2.  Type 1 diabetes  Type 1 diabetes occurs when the cells that make insulin are destroyed by your body's immune system. Your body can no longer make insulin on its own. People who have type 1 diabetes must take insulin to manage it.  Type 2 diabetes  Type 2 diabetes occurs when the cells of your body cannot use insulin or glucose normally. Over time, your body cannot make enough insulin to meet your body's needs. This is the most common kind of diabetes.  You are more likely to develop type 2 diabetes if  you:    Are overweight    Have high blood pressure    Have high cholesterol    Are not physically active    Have a family history of type 2 diabetes    Are , /, ,  American or     Are a woman who has given birth to a baby weighing over 9 pounds, or you have had gestational diabetes (diabetes that occurs in pregnancy).  For informational purposes only. Not to replace the advice of your health care provider.  Copyright   2006 [employer]. All rights reserved. [employer] [id number] - REV 12/15.  For informational purposes only. Not to replace the advice of your health care provider.  Copyright   2018 [employer]. All rights reserved.           Patient Education     Managing Type 2 Diabetes    Type 2 diabetes is a long-term (chronic) condition. Managing diabetes may mean making some tough changes. Your healthcare team can help you.  To manage type 2 diabetes, you'll need to balance your medicine with diet and activity. You will also need check your blood sugar. And work with your healthcare provider to prevent complications.  Take your medicine  You may take pills or give yourself insulin shots for diabetes. Or you may use both. Take your medicines or give yourself insulin at the right times to help you control your blood sugar. Think about ways that will help you remember to take your medicines the right way every day. Ask your healthcare provider or team for ideas.  You may only take pills for your diabetes. But this may change. Over time, most people with type 2 diabetes also need insulin.  Eat healthy  A healthy diet helps control the amount of sugar in your blood. It also helps you stay at a healthy weight. Or it helps you lose weight, if if you're overweight. Extra weight makes it harder to control diabetes.  Your healthcare team will help you create a plan that works for you. You don't have to give up all the foods you like.  Have meals and snacks with:    Vegetables    Fruits    Lean meats or other healthy proteins    Whole grains    Low- or nonfat dairy products    Be physically active  Being active helps lower your blood sugar. Activity helps your body use insulin to turn food into energy. It also helps you manage your weight:  Ask your healthcare provider to help you to make an activity program that's right for you. Your program is based on your age, general health, and types of activity you enjoy. Start slow. But aim for at least 150 minutes of exercise or activity each week. Start with 30 minutes a day. Exercise in 10-minute blocks. Don t let more than 2 days go by without being active.  Check your blood sugar  Checking your own blood sugar may be a regular part of your care. Or you may only need to check your blood sugar from time to time. Your healthcare provider will tell you how to check your blood sugar at home. Checking it tells you if your blood sugar is in your target range. Having your blood sugars within the target range means that you are managing your diabetes well.  If your blood sugar levels are too high or too low, your healthcare provider may suggest changes to your diet or activity level. He or she may also adjust your medicine.  Your healthcare provider may also tell you to check your blood sugar more often when you are sick.   Take care of yourself  When you have diabetes, you may be more likely to get other health problems. They include foot, eye, heart, nerve, and kidney problems. You can help prevent these problems by controlling your blood sugar and taking good care of yourself. Your healthcare provider, nurse, diabetes educator, and others can help you with the following:    Checkups. You should have regular checkups with your healthcare provider. At those visits, you will have a physical exam that includes checking your feet. Your healthcare provider will also check your blood pressure and weight. Take your  shoes off before your appointment starts to be sure your feet are checked.    Other exams. You'll also need eye, foot, and dental exams at least once each year.    Lab tests. You will have blood and urine tests:  ? Your healthcare provider will check your hemoglobin A1C at least twice a year. This blood test shows how well you have been controlling your blood sugar over 2 to 3 months. The results help your healthcare provider manage your diabetes.    ? You will also have other lab tests. For example, to check for kidney problems and abnormal cholesterol levels.    Smoking. If you smoke, you will need to quit. Smoking makes it more likely to get complications from diabetes. Ask your healthcare provider about ways to quit.    Vaccines. Get a yearly flu shot. And ask your healthcare provider about vaccines to prevent pneumonia, shingles, and hepatitis B.  Stress and depression  Most people have challenges throughout their lives. Living with diabetes can increase your stress. Feeling stressed or depressed can actually affect your blood sugar levels.  Tell your healthcare provider if you are having trouble coping with diabetes. He or she can help or refer you to other providers or programs.  Support and resources  Know where you can get help. You can try the following:    Support. Ask family and friends to support your efforts to take care of yourself. Or look for a diabetes support group nearby or on the internet. Check the Connect with Others link on www.diabetes.org.    Counseling. Talk with a , psychologist, psychiatrist, or other counselor.    Information. Contact the American Diabetes Association at 600-902-7421 or www.diabetes.org  Date Last Reviewed: 11/1/2018 2000-2019 [employer]. [address] [place] [address], [place] [address]. All rights reserved. This information is not intended as a substitute for professional medical care. Always follow your healthcare professional's  instructions.           Patient Education     Managing Diabetes: The A1C Test       Healthy red blood cells have some glucose stuck to them. A high A1C means that unhealthy amounts of glucose are stuck to the cells.   What is the A1C test?  Using your meter helps you track your blood sugar every day. But your glucose meter tells you the value at the time of testing only. You also need to know if your treatment plan is keeping you healthy over time. The hemoglobin A1C (or glycated hemoglobin) test can help. This test measures your average blood sugar level over a few months. A higher A1C result means that you have a higher risk of developing complications.  The A1C test  The A1C is a blood test done by your healthcare provider. You will likely have an A1C test every 3 to 6 months.  Your blood glucose goal  A1C has been shown as a percentage. But it can also be shown as a number representing the estimated Average Glucose (eAG). Unlike the A1C percentage, eAG is a number similar to the numbers listed on your daily glucose monitor. Both A1C and eAG measure the amount of glucose stuck to a protein called hemoglobin in red blood cells. Your healthcare provider will help you figure out what your ideal A1C or eAG should be. Your target number will depend on your age, general health, and other factors. If your current number is too high, your treatment plan may need changes, such as different medicines.  Sample results  Most people aim for an A1c lower than 7%. That s an eAG less than 154 mg/dL. Or, your healthcare provider may want you to aim for an A1C of 6%. That s an eAG of 126 mg/dL.     Glucose calculator  Visit http://professional.diabetes.org/diapro/glucose_calc for a chart that helps convert your A1C percentages into eAG numbers.   Date Last Reviewed: 6/1/2016 2000-2019 [employer]. [address] [place] [address], [place] [address]. All rights reserved. This information is not intended as a substitute for  professional medical care. Always follow your healthcare professional's instructions.

## 2020-09-15 NOTE — PROGRESS NOTES
"Subjective     Alia Belia Jauregui is a 45 year old female who presents to clinic today for the following health issues:    HPI       Diabetes Follow-up      How often are you checking your blood sugar? { :220647}    What concerns do you have today about your diabetes? { :973598::\"None\"}     Do you have any of these symptoms? (Select all that apply)  { :375637}      {Reference  Diabetes Management Resources :964471}    {Reference  Diabetes Log - 7 days :260408}    Hyperlipidemia Follow-Up      Are you regularly taking any medication or supplement to lower your cholesterol?   { :580004}    Are you having muscle aches or other side effects that you think could be caused by your cholesterol lowering medication?  { :100229}    Hypertension Follow-up      Do you check your blood pressure regularly outside of the clinic? { :390197}     Are you following a low salt diet? { :528051}    Are your blood pressures ever more than 140 on the top number (systolic) OR more   than 90 on the bottom number (diastolic), for example 140/90? { :588380}    BP Readings from Last 2 Encounters:   08/14/20 122/78   01/22/19 120/82     Hemoglobin A1C (%)   Date Value   09/03/2020 7.4 (H)     LDL Cholesterol Calculated (mg/dL)   Date Value   08/14/2020 87   02/12/2014 90         How many servings of fruits and vegetables do you eat daily?  { :426856}    On average, how many sweetened beverages do you drink each day (Examples: soda, juice, sweet tea, etc.  Do NOT count diet or artificially sweetened beverages)?   { 1-11:341848}    How many days per week do you exercise enough to make your heart beat faster? { :849571}    How many minutes a day do you exercise enough to make your heart beat faster? { :107725}    How many days per week do you miss taking your medication? {0-7 :483367}    {additonal problems for provider to add (Optional):077610}    Review of Systems   {ROS COMP (Optional):453170}      Objective    LMP 12/10/2014   There is no " height or weight on file to calculate BMI.  Physical Exam   {Exam List (Optional):135924}    {Diagnostic Test Results (Optional):621556}        {PROVIDER CHARTING PREFERENCE:980303}

## 2020-09-17 ENCOUNTER — TELEPHONE (OUTPATIENT)
Dept: FAMILY MEDICINE | Facility: CLINIC | Age: 45
End: 2020-09-17

## 2020-09-17 NOTE — TELEPHONE ENCOUNTER
Diabetes Education Scheduling Outreach #1:    Call to patient to schedule. Left message with phone number to call to schedule.    Plan for 2nd outreach attempt within 1 week.    Sharri Potter  [facility]  Diabetes and Nutrition Scheduling

## 2020-12-16 ENCOUNTER — PATIENT OUTREACH (OUTPATIENT)
Dept: EDUCATION SERVICES | Facility: CLINIC | Age: 45
End: 2020-12-16
Attending: INTERNAL MEDICINE
Payer: COMMERCIAL

## 2020-12-16 DIAGNOSIS — E11.9 TYPE 2 DIABETES MELLITUS WITHOUT COMPLICATION, WITHOUT LONG-TERM CURRENT USE OF INSULIN (H): Primary | ICD-10-CM

## 2020-12-16 PROCEDURE — G0108 DIAB MANAGE TRN  PER INDIV: HCPCS | Mod: 95 | Performed by: DIETITIAN, REGISTERED

## 2020-12-16 NOTE — PROGRESS NOTES
"Diabetes Self-Management Education & Support    Presents for: Initial Assessment for new diagnosis    SUBJECTIVE/OBJECTIVE:  Presents for: Initial Assessment for new diagnosis  Diabetes type: Type 2  Cultural Influences/Ethnic Background:        Diabetes Symptoms & Complications:          Patient Problem List and Family Medical History reviewed for relevant medical history, current medical status, and diabetes risk factors.    Vitals:  [facility] 12/10/2014   Estimated body mass index is 30.86 kg/m  as calculated from the following:    Height as of 9/15/20: 1.665 m (5' 5.55\").    Weight as of 9/15/20: 85.5 kg (188 lb 9.6 oz).   Last 3 BP:   BP Readings from Last 3 Encounters:   09/15/20 116/80   08/14/20 122/78   01/22/19 120/82       History   Smoking Status     Never Smoker   Smokeless Tobacco     Never Used       Labs:  Lab Results   Component Value Date    A1C 7.4 09/03/2020     Lab Results   Component Value Date     08/14/2020     Lab Results   Component Value Date    LDL 87 08/14/2020     HDL Cholesterol   Date Value Ref Range Status   08/14/2020 36 (L) >49 mg/dL Final   ]  GFR Estimate   Date Value Ref Range Status   11/10/2018 >90 >60 mL/min/1.7m2 Final     Comment:     Non  GFR Calc     GFR Estimate If Black   Date Value Ref Range Status   11/10/2018 >90 >60 mL/min/1.7m2 Final     Comment:      GFR Calc     Lab Results   Component Value Date    CR 0.71 11/10/2018     No results found for: MICROALBUMIN    Healthy Eating:  Healthy Eating Assessed Today: Yes  Breakfast: none, snack bar 21 grams  Lunch: cottage cheese and fruit and snack thing of cheese and nuts  Dinner: Trying to bring her carbs down: have meat with meal and then have it on lettuce, fruit (cranberries, strawberries), pecans, chicken or steak.  Has been trying to avoid the potatoes.  Snacks: usually does not eat snacks.  Other: is testing blood sugars: this am: 166 took it at work: 191, typically 150-190 " in the am.  4 hours post lunch: 110  Did some testing at night before bed: 180, three hour post meal.  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)    Monitoring:           Taking Medications:  not taking any medications at this time for diabetes         Problem Solving:                 Reducing Risks:       Healthy Coping:     Patient Activation Measure Survey Score:  KARLA Score (Last Two) 11/3/2010   KARLA Raw Score 45   Activation Score 73.1   KARLA Level 4       Diabetes knowledge and skills assessment:   Patient is knowledgeable in diabetes management concepts related to: Healthy Eating, Being Active and Monitoring    Patient needs further education on the following diabetes management concepts: Healthy Eating, Being Active, Monitoring, Taking Medication, Problem Solving, Reducing Risks and Healthy Coping    Based on learning assessment above, most appropriate setting for further diabetes education would be: Individual setting.      INTERVENTIONS:    Education provided today on:  AADE Self-Care Behaviors:  Diabetes Pathophysiology  Healthy Eating: carbohydrate counting, consistency in amount, composition, and timing of food intake and label reading  Being Active: relationship to blood glucose and describe appropriate activity program  Monitoring: proper technique, log and interpret results and individual blood glucose targets  Taking Medication: action of prescribed medication, side effects of prescribed medications and when to take medications    Opportunities for ongoing education and support in diabetes-self management were discussed.    Pt verbalized understanding of concepts discussed and recommendations provided today.       Education Materials Provided:  [facility] Understanding Diabetes Booklet and Carbohydrate Counting      ASSESSMENT:  New to diabetes, went over meal plan.  She is  watching her carbs quite well.  She is staying active at work.  She has made many changes.  Still having some higher numbers even with all the changes, may need to add metformin.  She has another month before an a1c is done again she stated.  Recheck again with her in one month to assess numbers, meal plan and discuss complications of diabetes.        Patient's most recent   Lab Results   Component Value Date    A1C 7.4 09/03/2020    is not meeting goal of <7.0    PLAN  See Patient Instructions for co-developed, patient-stated behavior change goals.  AVS printed and provided to patient today. See Follow-Up section for recommended follow-up.      Time Spent: 45 minutes  Encounter Type: Individual    Any diabetes medication dose changes were made via the CDE Protocol and Collaborative Practice Agreement with the patient's primary care provider. A copy of this encounter was shared with the provider.

## 2021-01-15 ENCOUNTER — HEALTH MAINTENANCE LETTER (OUTPATIENT)
Age: 46
End: 2021-01-15

## 2021-02-05 ENCOUNTER — HOSPITAL ENCOUNTER (OUTPATIENT)
Dept: CT IMAGING | Facility: CLINIC | Age: 46
Discharge: HOME | End: 2021-02-05
Attending: INTERNAL MEDICINE | Admitting: INTERNAL MEDICINE
Payer: COMMERCIAL

## 2021-02-05 DIAGNOSIS — R10.9 RIGHT FLANK PAIN: ICD-10-CM

## 2021-02-05 PROCEDURE — 74176 CT ABD & PELVIS W/O CONTRAST: CPT

## 2021-02-11 DIAGNOSIS — E11.9 TYPE 2 DIABETES MELLITUS WITHOUT COMPLICATION, WITHOUT LONG-TERM CURRENT USE OF INSULIN (H): ICD-10-CM

## 2021-02-11 LAB
ANION GAP SERPL CALCULATED.3IONS-SCNC: 3 MMOL/L (ref 3–14)
BUN SERPL-MCNC: 17 MG/DL (ref 7–30)
CALCIUM SERPL-MCNC: 9.1 MG/DL (ref 8.5–10.1)
CHLORIDE SERPL-SCNC: 102 MMOL/L (ref 94–109)
CO2 SERPL-SCNC: 31 MMOL/L (ref 20–32)
CREAT SERPL-MCNC: 0.75 MG/DL (ref 0.52–1.04)
GFR SERPL CREATININE-BSD FRML MDRD: >90 ML/MIN/{1.73_M2}
GLUCOSE SERPL-MCNC: 142 MG/DL (ref 70–99)
HBA1C MFR BLD: 7.1 % (ref 0–5.6)
POTASSIUM SERPL-SCNC: 3.8 MMOL/L (ref 3.4–5.3)
SODIUM SERPL-SCNC: 136 MMOL/L (ref 133–144)

## 2021-02-11 PROCEDURE — 80048 BASIC METABOLIC PNL TOTAL CA: CPT | Performed by: INTERNAL MEDICINE

## 2021-02-11 PROCEDURE — 36415 COLL VENOUS BLD VENIPUNCTURE: CPT | Performed by: INTERNAL MEDICINE

## 2021-02-11 PROCEDURE — 83036 HEMOGLOBIN GLYCOSYLATED A1C: CPT | Performed by: INTERNAL MEDICINE

## 2021-02-12 ENCOUNTER — OFFICE VISIT (OUTPATIENT)
Dept: FAMILY MEDICINE | Facility: CLINIC | Age: 46
End: 2021-02-12
Payer: COMMERCIAL

## 2021-02-12 VITALS
DIASTOLIC BLOOD PRESSURE: 86 MMHG | TEMPERATURE: 97.8 F | SYSTOLIC BLOOD PRESSURE: 136 MMHG | BODY MASS INDEX: 31.39 KG/M2 | HEIGHT: 65 IN | HEART RATE: 72 BPM | WEIGHT: 188.4 LBS | OXYGEN SATURATION: 99 %

## 2021-02-12 DIAGNOSIS — Z11.59 ENCOUNTER FOR HEPATITIS C SCREENING TEST FOR LOW RISK PATIENT: ICD-10-CM

## 2021-02-12 DIAGNOSIS — Z23 NEED FOR VACCINATION: ICD-10-CM

## 2021-02-12 DIAGNOSIS — E11.9 TYPE 2 DIABETES MELLITUS WITHOUT COMPLICATION, WITHOUT LONG-TERM CURRENT USE OF INSULIN (H): Primary | ICD-10-CM

## 2021-02-12 PROCEDURE — 99207 PR FOOT EXAM NO CHARGE: CPT | Performed by: INTERNAL MEDICINE

## 2021-02-12 PROCEDURE — 99213 OFFICE O/P EST LOW 20 MIN: CPT | Mod: 25 | Performed by: INTERNAL MEDICINE

## 2021-02-12 PROCEDURE — 90471 IMMUNIZATION ADMIN: CPT | Performed by: INTERNAL MEDICINE

## 2021-02-12 PROCEDURE — 90714 TD VACC NO PRESV 7 YRS+ IM: CPT | Performed by: INTERNAL MEDICINE

## 2021-02-12 ASSESSMENT — MIFFLIN-ST. JEOR: SCORE: 1502.96

## 2021-02-12 NOTE — PROGRESS NOTES
Assessment & Plan     Type 2 diabetes mellitus without complication, without long-term current use of insulin (H)    Controlled with A1C of 7.1.  Continue dietary control.  She plans to schedule eye exam.  Normal foot exam today.  Recheck A1C in 6 months.      - FOOT EXAM  - **A1C FUTURE 6mo; Future  - Lipid panel reflex to direct LDL Fasting; Future  - Albumin Random Urine Quantitative with Creat Ratio; Future    Encounter for hepatitis C screening test for low risk patient    - **Hepatitis C Screen Reflex to RNA FUTURE anytime; Future    Need for vaccination    - TD PRSERV FREE >=7 YRS ADS IM [[id number]]       Return in about 6 months (around 8/12/2021) for Lab appointment, Follow up (virtual appointment okay).    Tariq Paige MD  [facility] REYMUNDO Rojo is a 45 year old who presents for the following health issues   Chief Complaint   Patient presents with     Diabetes       HPI       Diabetes Follow-up    How often are you checking your blood sugar? One time daily- sometimes more  What time of day are you checking your blood sugars (select all that apply)?  Before meals and first thing in a.m., after work, and before bed  Have you had any blood sugars above 200?  No  Have you had any blood sugars below 70?  No    What symptoms do you notice when your blood sugar is low?  None    What concerns do you have today about your diabetes? None     Do you have any of these symptoms? (Select all that apply)  No numbness or tingling in feet.  No redness, sores or blisters on feet.  No complaints of excessive thirst.  No reports of blurry vision.  No significant changes to weight.     Have you had a diabetic eye exam in the last 12 months? No      She had some back right flank pain recently so we did a CT that was negative for stones, no known injury, just woke up with pain.  No rash.  Pain is improved now, still some milder pain.        Hyperlipidemia Follow-Up      Are you regularly taking  "any medication or supplement to lower your cholesterol?   Yes- atorvastatin     Are you having muscle aches or other side effects that you think could be caused by your cholesterol lowering medication?  No    Hypertension Follow-up      Do you check your blood pressure regularly outside of the clinic? No     Are you following a low salt diet? Yes    Are your blood pressures ever more than 140 on the top number (systolic) OR more   than 90 on the bottom number (diastolic), for example 140/90? Unknown not checking     BP Readings from Last 2 Encounters:   02/12/21 136/86   09/15/20 116/80     Hemoglobin A1C (%)   Date Value   02/11/2021 7.1 (H)   09/03/2020 7.4 (H)     LDL Cholesterol Calculated (mg/dL)   Date Value   08/14/2020 87   02/12/2014 90         How many servings of fruits and vegetables do you eat daily?  2-3    On average, how many sweetened beverages do you drink each day (Examples: soda, juice, sweet tea, etc.  Do NOT count diet or artificially sweetened beverages)?   0    How many days per week do you exercise enough to make your heart beat faster? 3 or less    How many minutes a day do you exercise enough to make your heart beat faster? 20 - 29    How many days per week do you miss taking your medication? 0      Review of Systems   Constitutional, endo systems are negative, except as otherwise noted.      Objective    /86 (BP Location: Right arm, Patient Position: Right side, Cuff Size: Adult Regular)   Pulse 72   Temp 97.8  F (36.6  C) (Tympanic)   Ht 1.647 m (5' 4.84\")   Wt 85.5 kg (188 lb 6.4 oz)   [facility] 12/10/2014   SpO2 99%   BMI 31.50 kg/m    Body mass index is 31.5 kg/m .  Physical Exam   GENERAL: healthy, alert and no distress  Diabetic foot exam: normal DP and PT pulses, no trophic changes or ulcerative lesions and normal monofilament exam              "

## 2021-02-12 NOTE — NURSING NOTE
Prior to immunization administration, verified patients identity using patient s name and date of birth. Please see Immunization Activity for additional information.     Screening Questionnaire for Adult Immunization    Are you sick today?   No   Do you have allergies to medications, food, a vaccine component or latex?   No   Have you ever had a serious reaction after receiving a vaccination?   No   Do you have a long-term health problem with heart, lung, kidney, or metabolic disease (e.g., diabetes), asthma, a blood disorder, no spleen, complement component deficiency, a cochlear implant, or a spinal fluid leak?  Are you on long-term aspirin therapy?   No   Do you have cancer, leukemia, HIV/AIDS, or any other immune system problem?   No   Do you have a parent, brother, or sister with an immune system problem?   No   In the past 3 months, have you taken medications that affect  your immune system, such as prednisone, other steroids, or anticancer drugs; drugs for the treatment of rheumatoid arthritis, Crohn s disease, or psoriasis; or have you had radiation treatments?   No   Have you had a seizure, or a brain or other nervous system problem?   No   During the past year, have you received a transfusion of blood or blood    products, or been given immune (gamma) globulin or antiviral drug?   No   For women: Are you pregnant or is there a chance you could become       pregnant during the next month?   No   Have you received any vaccinations in the past 4 weeks?   No     Immunization questionnaire answers were all negative.        Per orders of Dr. Paige, injection of Td given by Nancy Hudson CMA. Patient instructed to remain in clinic for 15 minutes afterwards, and to report any adverse reaction to me immediately.       Screening performed by Nancy Hudson CMA on 2/12/2021 at 4:18 PM.

## 2021-02-17 ENCOUNTER — PATIENT OUTREACH (OUTPATIENT)
Dept: EDUCATION SERVICES | Facility: CLINIC | Age: 46
End: 2021-02-17
Payer: COMMERCIAL

## 2021-02-17 DIAGNOSIS — E11.9 TYPE 2 DIABETES MELLITUS WITHOUT COMPLICATION, WITHOUT LONG-TERM CURRENT USE OF INSULIN (H): Primary | ICD-10-CM

## 2021-02-17 PROCEDURE — G0108 DIAB MANAGE TRN  PER INDIV: HCPCS | Mod: 95 | Performed by: DIETITIAN, REGISTERED

## 2021-02-17 NOTE — PROGRESS NOTES
"Diabetes Self-Management Education & Support    Presents for: Individual review    SUBJECTIVE/OBJECTIVE:  Presents for: Individual review  Diabetes type: Type 2  Cultural Influences/Ethnic Background:        Diabetes Symptoms & Complications:          Patient Problem List and Family Medical History reviewed for relevant medical history, current medical status, and diabetes risk factors.    Vitals:  LMP 12/10/2014   Estimated body mass index is 31.5 kg/m  as calculated from the following:    Height as of 2/12/21: 1.647 m (5' 4.84\").    Weight as of 2/12/21: 85.5 kg (188 lb 6.4 oz).   Last 3 BP:   BP Readings from Last 3 Encounters:   02/12/21 136/86   09/15/20 116/80   08/14/20 122/78       History   Smoking Status     Never Smoker   Smokeless Tobacco     Never Used       Labs:  Lab Results   Component Value Date    A1C 7.1 02/11/2021     Lab Results   Component Value Date     02/11/2021     Lab Results   Component Value Date    LDL 87 08/14/2020     HDL Cholesterol   Date Value Ref Range Status   08/14/2020 36 (L) >49 mg/dL Final   ]  GFR Estimate   Date Value Ref Range Status   02/11/2021 >90 >60 mL/min/[1.73_m2] Final     Comment:     Non  GFR Calc  Starting 12/18/2018, serum creatinine based estimated GFR (eGFR) will be   calculated using the Chronic Kidney Disease Epidemiology Collaboration   (CKD-EPI) equation.       GFR Estimate If Black   Date Value Ref Range Status   02/11/2021 >90 >60 mL/min/[1.73_m2] Final     Comment:      GFR Calc  Starting 12/18/2018, serum creatinine based estimated GFR (eGFR) will be   calculated using the Chronic Kidney Disease Epidemiology Collaboration   (CKD-EPI) equation.       Lab Results   Component Value Date    CR 0.75 02/11/2021     No results found for: MICROALBUMIN    Healthy Eating:  Healthy Eating Assessed Today: Yes  Breakfast: usually hard boiled egg or yogurt and granola, milk or water  Lunch: cottage cheese and fruit or " yogurt and granola, protein pack  Dinner: dairy queen chicken strips, fries or chicken or steak, salad still some.  Snacks: does not do much snacking, sugar free candy  Beverages: Water, Milk    Being Active:  Being Active Assessed Today: Yes  Exercise:: Yes(got the treadmill up and running again)  Days per week of moderate to strenuous exercise (like a brisk walk): 4(3-4 days week 15 minutes fast paced walk, added some stretches as well)    Monitoring:      Breakfast pp Lunch pp Supper  pp HS   10-Feb 166         11-Feb 170         12-Feb          13-Feb 173         14-Feb 179         15-Feb 157      133   16-Feb 137      147   17-Feb 151          #DIV/0! #DIV/0! #DIV/0! #DIV/0! #DIV/0! 140         Taking Medications:  not on medications         Problem Solving:   not assessed              Reducing Risks:   went over risks    Healthy Coping:     Patient Activation Measure Survey Score:  KARLA Score (Last Two) 11/3/2010   KARLA Raw Score 45   Activation Score 73.1   KARLA Level 4       Diabetes knowledge and skills assessment:   Patient is knowledgeable in diabetes management concepts related to: Healthy Eating, Being Active and Monitoring    Patient needs further education on the following diabetes management concepts: Healthy Eating, Being Active, Monitoring, Taking Medication, Problem Solving, Reducing Risks and Healthy Coping    Based on learning assessment above, most appropriate setting for further diabetes education would be: Individual setting.      INTERVENTIONS:    Education provided today on:  AADE Self-Care Behaviors:  Healthy Eating: consistency in amount, composition, and timing of food intake and label reading  Being Active: relationship to blood glucose and describe appropriate activity program  Reducing Risks: major complications of diabetes, prevention, early diagnostic measures and treatment of complications, foot care, appropriate dental care, annual eye exam, smoking cessation, aspirin therapy,  A1C - goals, relating to blood glucose levels, how often to check, lipids levels and goals and blood pressure and goals    Opportunities for ongoing education and support in diabetes-self management were discussed.    Pt verbalized understanding of concepts discussed and recommendations provided today.       Education Materials Provided:  No new materials provided today      ASSESSMENT:  Went over complications of diabetes with her.  She is doing well a1c went from 7.4 to 7.1%, she is exercising and watching her food choices.  NO meds, will continue to work on meal plan and activity.        Patient's most recent   Lab Results   Component Value Date    A1C 7.1 02/11/2021    is meeting goal of <7.0    PLAN  See Patient Instructions for co-developed, patient-stated behavior change goals.  AVS printed and provided to patient today. See Follow-Up section for recommended follow-up.      Time Spent: 30 minutes  Encounter Type: Individual    Any diabetes medication dose changes were made via the CDE Protocol and Collaborative Practice Agreement with the patient's primary care provider. A copy of this encounter was shared with the provider.

## 2021-05-29 ENCOUNTER — HEALTH MAINTENANCE LETTER (OUTPATIENT)
Age: 46
End: 2021-05-29

## 2021-08-27 DIAGNOSIS — I10 HTN, GOAL BELOW 130/80: ICD-10-CM

## 2021-08-27 RX ORDER — ATENOLOL 50 MG/1
TABLET ORAL
Qty: 90 TABLET | Refills: 0 | Status: SHIPPED | OUTPATIENT
Start: 2021-08-27 | End: 2021-10-04

## 2021-09-04 ENCOUNTER — TRANSFERRED RECORDS (OUTPATIENT)
Dept: HEALTH INFORMATION MANAGEMENT | Facility: CLINIC | Age: 46
End: 2021-09-04

## 2021-09-04 LAB — RETINOPATHY: NEGATIVE

## 2021-09-12 ENCOUNTER — HEALTH MAINTENANCE LETTER (OUTPATIENT)
Age: 46
End: 2021-09-12

## 2021-10-03 ENCOUNTER — LAB (OUTPATIENT)
Dept: LAB | Facility: CLINIC | Age: 46
End: 2021-10-03
Payer: COMMERCIAL

## 2021-10-03 DIAGNOSIS — E11.9 TYPE 2 DIABETES MELLITUS WITHOUT COMPLICATION, WITHOUT LONG-TERM CURRENT USE OF INSULIN (H): ICD-10-CM

## 2021-10-03 DIAGNOSIS — Z11.59 ENCOUNTER FOR HEPATITIS C SCREENING TEST FOR LOW RISK PATIENT: ICD-10-CM

## 2021-10-03 LAB — HBA1C MFR BLD: 7.6 % (ref 0–5.6)

## 2021-10-03 PROCEDURE — 83036 HEMOGLOBIN GLYCOSYLATED A1C: CPT

## 2021-10-03 PROCEDURE — 36415 COLL VENOUS BLD VENIPUNCTURE: CPT

## 2021-10-03 PROCEDURE — 86803 HEPATITIS C AB TEST: CPT

## 2021-10-03 PROCEDURE — 80061 LIPID PANEL: CPT

## 2021-10-04 ENCOUNTER — VIRTUAL VISIT (OUTPATIENT)
Dept: FAMILY MEDICINE | Facility: CLINIC | Age: 46
End: 2021-10-04
Payer: COMMERCIAL

## 2021-10-04 DIAGNOSIS — I10 HTN, GOAL BELOW 130/80: ICD-10-CM

## 2021-10-04 DIAGNOSIS — E11.9 TYPE 2 DIABETES MELLITUS WITHOUT COMPLICATION, WITHOUT LONG-TERM CURRENT USE OF INSULIN (H): ICD-10-CM

## 2021-10-04 LAB
CHOLEST SERPL-MCNC: 138 MG/DL
HCV AB SERPL QL IA: NONREACTIVE
HDLC SERPL-MCNC: 46 MG/DL
LDLC SERPL CALC-MCNC: 62 MG/DL
NONHDLC SERPL-MCNC: 92 MG/DL
TRIGL SERPL-MCNC: 152 MG/DL

## 2021-10-04 PROCEDURE — 99214 OFFICE O/P EST MOD 30 MIN: CPT | Mod: GT | Performed by: INTERNAL MEDICINE

## 2021-10-04 RX ORDER — ATORVASTATIN CALCIUM 20 MG/1
20 TABLET, FILM COATED ORAL DAILY
Qty: 90 TABLET | Refills: 3 | Status: SHIPPED | OUTPATIENT
Start: 2021-10-04 | End: 2021-12-01

## 2021-10-04 RX ORDER — ATENOLOL 50 MG/1
50 TABLET ORAL DAILY
Qty: 90 TABLET | Refills: 3 | Status: SHIPPED | OUTPATIENT
Start: 2021-10-04 | End: 2021-12-01

## 2021-10-04 NOTE — PROGRESS NOTES
Alia is a 46 year old who is being evaluated via a billable video visit.      How would you like to obtain your AVS? MyChart  If the video visit is dropped, the invitation should be resent by: Text to cell phone: [phone]  Will anyone else be joining your video visit? No      Video Start Time: 11:49 AM    Assessment & Plan     HTN, goal below 130/80    BP not recently checked.  Recommend either in person visit for next follow-up or RN BP check if visit is going to be virtual.  Continue current med for now.     - atenolol (TENORMIN) 50 MG tablet; Take 1 tablet (50 mg) by mouth daily  - **Basic metabolic panel FUTURE 6mo; Future    Type 2 diabetes mellitus without complication, without long-term current use of insulin (H)    A1C has gone up from 7.1 to 7.6.  Discussed goal of A1C <7 vs 8.  Given her younger age, may want to target <7.  Discussed starting metformin now vs rechecking in a few months- she just resumed work after summer break and is now more active, so she'd like to recheck A1C in 3 months first to see if it may improve with the lifestyle change.      - Lipid panel reflex to direct LDL Fasting; Future  - atorvastatin (LIPITOR) 20 MG tablet; Take 1 tablet (20 mg) by mouth daily  - Albumin Random Urine Quantitative with Creat Ratio; Future  - **A1C FUTURE 3mo; Future      ACP planning discussed and she deferred for now.  Plans to get flu vax at pharmacy.    Return in about 3 months (around 1/4/2022) for Follow up visit, Lab appointment & nurse blood pressure check (unless follow-up visit is in person).    Tariq Paige MD  [facility]    Keegan Rojo is a 46 year old who presents for the following health issues     HPI     Diabetes Follow-up    How often are you checking your blood sugar? 1-2 times daily; running between 160-220, averaging in the middle of that  What time of day are you checking your blood sugars (select all that apply)?  Before and after meals  Have you had  any blood sugars above 200?  Sometimes  Have you had any blood sugars below 70?  No    What symptoms do you notice when your blood sugar is low?  None    What concerns do you have today about your diabetes? A1c went up.     Do you have any of these symptoms? (Select all that apply)  No numbness or tingling in feet.  No redness, sores or blisters on feet.  No complaints of excessive thirst.  No reports of blurry vision.  No significant changes to weight.     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   Yes- atorvastatin    Are you having muscle aches or other side effects that you think could be caused by your cholesterol lowering medication?  No    Hypertension Follow-up      Do you check your blood pressure regularly outside of the clinic? No     Are you following a low salt diet? Yes    Are your blood pressures ever more than 140 on the top number (systolic) OR more   than 90 on the bottom number (diastolic), for example 140/90? No    BP Readings from Last 2 Encounters:   02/12/21 136/86   09/15/20 116/80     Hemoglobin A1C (%)   Date Value   10/03/2021 7.6 (H)   02/11/2021 7.1 (H)   09/03/2020 7.4 (H)     LDL Cholesterol Calculated (mg/dL)   Date Value   08/14/2020 87   02/12/2014 90         How many servings of fruits and vegetables do you eat daily?  2-3    On average, how many sweetened beverages do you drink each day (Examples: soda, juice, sweet tea, etc.  Do NOT count diet or artificially sweetened beverages)?   0    How many days per week do you exercise enough to make your heart beat faster? 3 or less    How many minutes a day do you exercise enough to make your heart beat faster? 20 - 29    How many days per week do you miss taking your medication? 0    PHQ-2 Score:     PHQ-2 ( 1999 [employer])  10/4/2021 8/14/2020   Q1: Little interest or pleasure in doing things 0 0   Q2: Feeling down, depressed or hopeless 0 0   PHQ-2 Score 0 0           Review of Systems   Constitutional, endo systems are negative, except as otherwise noted.      Objective           Vitals:  No vitals were obtained today due to virtual visit.    Physical Exam   GENERAL: Healthy, alert and no distress  EYES: Eyes grossly normal to inspection.  No discharge or erythema, or obvious scleral/conjunctival abnormalities.  RESP: No audible wheeze, cough, or visible cyanosis.  No visible retractions or increased work of breathing.    SKIN: Visible skin clear. No significant rash, abnormal pigmentation or lesions.  NEURO: Cranial nerves grossly intact.  Mentation and speech appropriate for age.  PSYCH: Mentation appears normal, affect normal/bright, judgement and insight intact, normal speech and appearance well-groomed.            Video-Visit Details    Type of service:  Video Visit    Video End Time:11:57 AM    Originating Location (pt. Location): Home    Distant Location (provider location):  Home    Platform used for Video Visit: [employer]

## 2021-11-07 ENCOUNTER — HEALTH MAINTENANCE LETTER (OUTPATIENT)
Age: 46
End: 2021-11-07

## 2021-11-30 DIAGNOSIS — E11.9 TYPE 2 DIABETES MELLITUS WITHOUT COMPLICATION, WITHOUT LONG-TERM CURRENT USE OF INSULIN (H): ICD-10-CM

## 2021-11-30 DIAGNOSIS — I10 HTN, GOAL BELOW 130/80: ICD-10-CM

## 2021-12-01 RX ORDER — ISOPROPYL ALCOHOL 0.75 G/1
SWAB TOPICAL
Qty: 100 EACH | Refills: 3 | Status: SHIPPED | OUTPATIENT
Start: 2021-12-01 | End: 2022-10-21

## 2021-12-01 RX ORDER — ATENOLOL 50 MG/1
TABLET ORAL
Qty: 90 TABLET | Refills: 3 | Status: SHIPPED | OUTPATIENT
Start: 2021-12-01 | End: 2022-10-04

## 2021-12-01 RX ORDER — ATORVASTATIN CALCIUM 20 MG/1
TABLET, FILM COATED ORAL
Qty: 90 TABLET | Refills: 3 | Status: SHIPPED | OUTPATIENT
Start: 2021-12-01 | End: 2022-10-04

## 2021-12-01 NOTE — TELEPHONE ENCOUNTER
I spoke with Wong and advised  He will add Lisinopril 10 mg daily to current meds  Aware of order for BMP in 1-2 weeks  Script sent to Dr Anita Batista to sign  I advised him to monitor BP daily and cb if needed  Pending Prescriptions:                       Disp   Refills    atenolol (TENORMIN) 50 MG tablet [Pharmac*90 tab*0            Sig: Take 1 tablet by mouth once daily    Alcohol Swabs (B-D SINGLE USE SWABS REGUL*       0            Sig: USE TO SWAB AREA OF INJECTION/JUVE AS DIRECTED    atorvastatin (LIPITOR) 20 MG tablet [Phar*90 tab*0            Sig: Take 1 tablet by mouth once daily    Routing refill request to provider for review/approval because:  Drug not on the [facility] refill protocol - alcohol pads      Hector Guzman RN

## 2022-04-24 ENCOUNTER — HEALTH MAINTENANCE LETTER (OUTPATIENT)
Age: 47
End: 2022-04-24

## 2022-10-04 ENCOUNTER — ANCILLARY PROCEDURE (OUTPATIENT)
Dept: GENERAL RADIOLOGY | Facility: CLINIC | Age: 47
End: 2022-10-04
Attending: NURSE PRACTITIONER
Payer: COMMERCIAL

## 2022-10-04 ENCOUNTER — OFFICE VISIT (OUTPATIENT)
Dept: FAMILY MEDICINE | Facility: CLINIC | Age: 47
End: 2022-10-04
Payer: COMMERCIAL

## 2022-10-04 VITALS
BODY MASS INDEX: 30.99 KG/M2 | OXYGEN SATURATION: 99 % | DIASTOLIC BLOOD PRESSURE: 78 MMHG | RESPIRATION RATE: 14 BRPM | SYSTOLIC BLOOD PRESSURE: 138 MMHG | TEMPERATURE: 98.6 F | WEIGHT: 186 LBS | HEIGHT: 65 IN | HEART RATE: 78 BPM

## 2022-10-04 DIAGNOSIS — S89.92XA INJURY OF LEFT KNEE, INITIAL ENCOUNTER: ICD-10-CM

## 2022-10-04 DIAGNOSIS — S89.91XA INJURY OF RIGHT KNEE, INITIAL ENCOUNTER: ICD-10-CM

## 2022-10-04 DIAGNOSIS — S89.92XA INJURY OF LEFT KNEE, INITIAL ENCOUNTER: Primary | ICD-10-CM

## 2022-10-04 DIAGNOSIS — E11.9 TYPE 2 DIABETES MELLITUS WITHOUT COMPLICATION, WITHOUT LONG-TERM CURRENT USE OF INSULIN (H): ICD-10-CM

## 2022-10-04 DIAGNOSIS — I10 HTN, GOAL BELOW 130/80: ICD-10-CM

## 2022-10-04 LAB
ALBUMIN SERPL BCG-MCNC: 4.3 G/DL (ref 3.5–5.2)
ALP SERPL-CCNC: 65 U/L (ref 35–104)
ALT SERPL W P-5'-P-CCNC: 27 U/L (ref 10–35)
ANION GAP SERPL CALCULATED.3IONS-SCNC: 15 MMOL/L (ref 7–15)
AST SERPL W P-5'-P-CCNC: 23 U/L (ref 10–35)
BILIRUB SERPL-MCNC: 2 MG/DL
BUN SERPL-MCNC: 12 MG/DL (ref 6–20)
CALCIUM SERPL-MCNC: 8.9 MG/DL (ref 8.6–10)
CHLORIDE SERPL-SCNC: 99 MMOL/L (ref 98–107)
CHOLEST SERPL-MCNC: 126 MG/DL
CREAT SERPL-MCNC: 0.71 MG/DL (ref 0.51–0.95)
CREAT UR-MCNC: 125.1 MG/DL
DEPRECATED HCO3 PLAS-SCNC: 23 MMOL/L (ref 22–29)
GFR SERPL CREATININE-BSD FRML MDRD: >90 ML/MIN/1.73M2
GLUCOSE SERPL-MCNC: 251 MG/DL (ref 70–99)
HBA1C MFR BLD: 8.1 % (ref 0–5.6)
HDLC SERPL-MCNC: 42 MG/DL
LDLC SERPL CALC-MCNC: 44 MG/DL
MICROALBUMIN UR-MCNC: <12 MG/L
MICROALBUMIN/CREAT UR: NORMAL MG/G{CREAT}
NONHDLC SERPL-MCNC: 84 MG/DL
POTASSIUM SERPL-SCNC: 4.3 MMOL/L (ref 3.4–5.3)
PROT SERPL-MCNC: 7.2 G/DL (ref 6.4–8.3)
SODIUM SERPL-SCNC: 137 MMOL/L (ref 136–145)
TRIGL SERPL-MCNC: 201 MG/DL

## 2022-10-04 PROCEDURE — 82043 UR ALBUMIN QUANTITATIVE: CPT | Performed by: NURSE PRACTITIONER

## 2022-10-04 PROCEDURE — 73562 X-RAY EXAM OF KNEE 3: CPT | Mod: TC | Performed by: RADIOLOGY

## 2022-10-04 PROCEDURE — 80061 LIPID PANEL: CPT | Performed by: NURSE PRACTITIONER

## 2022-10-04 PROCEDURE — 80053 COMPREHEN METABOLIC PANEL: CPT | Performed by: NURSE PRACTITIONER

## 2022-10-04 PROCEDURE — 83036 HEMOGLOBIN GLYCOSYLATED A1C: CPT | Performed by: NURSE PRACTITIONER

## 2022-10-04 PROCEDURE — 36415 COLL VENOUS BLD VENIPUNCTURE: CPT | Performed by: NURSE PRACTITIONER

## 2022-10-04 PROCEDURE — 99214 OFFICE O/P EST MOD 30 MIN: CPT | Performed by: NURSE PRACTITIONER

## 2022-10-04 RX ORDER — ATENOLOL 50 MG/1
50 TABLET ORAL DAILY
Qty: 90 TABLET | Refills: 3 | Status: SHIPPED | OUTPATIENT
Start: 2022-10-04 | End: 2023-10-20

## 2022-10-04 RX ORDER — ATORVASTATIN CALCIUM 20 MG/1
20 TABLET, FILM COATED ORAL DAILY
Qty: 90 TABLET | Refills: 3 | Status: SHIPPED | OUTPATIENT
Start: 2022-10-04 | End: 2023-10-20

## 2022-10-04 ASSESSMENT — PAIN SCALES - GENERAL: PAINLEVEL: SEVERE PAIN (7)

## 2022-10-04 NOTE — PROGRESS NOTES
"  Assessment & Plan       ICD-10-CM    1. Injury of left knee, initial encounter  S89.92XA XR Knee Bilateral 3 Views   2. Injury of right knee, initial encounter  S89.91XA XR Knee Bilateral 3 Views   3. Type 2 diabetes mellitus without complication, without long-term current use of insulin (H)  E11.9 Comprehensive metabolic panel     Hemoglobin A1c     Lipid panel reflex to direct LDL Non-fasting     Albumin Random Urine Quantitative with Creat Ratio     atorvastatin (LIPITOR) 20 MG tablet     Comprehensive metabolic panel     Hemoglobin A1c     Lipid panel reflex to direct LDL Non-fasting     Albumin Random Urine Quantitative with Creat Ratio   4. HTN, goal below 130/80  I10 atenolol (TENORMIN) 50 MG tablet     Bilateral knee x-rays do not show any significant bony abnormalities.  Some hypertrophic changes noted to the medial aspect of her right knee discussed with the patient.. Discussed symptomatic management and time should help resolve some of the symptoms.  History of type 2 diabetes will complete labs today as above.  Blood pressure is well controlled.  She continues to tolerate her medications without any significant side effects.  Continue on medications as prescribed.  I will update patient via [facility] with lab results.  Encouraged her to follow-up with a preventative care visit and establish care.       BMI:   Estimated body mass index is 31.14 kg/m  as calculated from the following:    Height as of this encounter: 1.646 m (5' 4.8\").    Weight as of this encounter: 84.4 kg (186 lb).           Return in about 3 weeks (around 10/25/2022) for Routine preventive, Diabetes Follow up.    [facility]    Keegan Rojo is a 47 year old, presenting for the following health issues:  Knee Pain (Pt fell on both knees left knee is getting better but right knee still has pain and pressure)      History of Present Illness       Reason for visit:  Knee pain, fell " and hurt both knees  Symptom onset:  1-2 weeks ago    She eats 2-3 servings of fruits and vegetables daily.She consumes 1 sweetened beverage(s) daily.She exercises with enough effort to increase her heart rate 10 to 19 minutes per day.  She exercises with enough effort to increase her heart rate 3 or less days per week. She is missing 1 dose(s) of medications per week.     Fell on the tile at home after tripping over a shoe. She has an abrasion on the left knee and a gouge in the right knee. She fell 1 week ago. Symptoms of pain has persisted. Pain is over the top the patella. No pain on the sides of the knees.     Diabetes Follow-up    How often are you checking your blood sugar? A few times a month  What time of day are you checking your blood sugars (select all that apply)?  varies  Have you had any blood sugars above 200?  No  Have you had any blood sugars below 70?  No    What symptoms do you notice when your blood sugar is low?  None    What concerns do you have today about your diabetes? None     Do you have any of these symptoms? (Select all that apply)  No numbness or tingling in feet.  No redness, sores or blisters on feet.  No complaints of excessive thirst.  No reports of blurry vision.  No significant changes to weight.    Have you had a diabetic eye exam in the last 12 months? No                Hyperlipidemia Follow-Up      Are you regularly taking any medication or supplement to lower your cholesterol?   Yes- Atorvastatin    Are you having muscle aches or other side effects that you think could be caused by your cholesterol lowering medication?  No    Hypertension Follow-up      Do you check your blood pressure regularly outside of the clinic? No     Are you following a low salt diet? Yes    Are your blood pressures ever more than 140 on the top number (systolic) OR more   than 90 on the bottom number (diastolic), for example 140/90? No    BP Readings from Last 2 Encounters:   10/04/22 138/78  "  02/12/21 136/86     Hemoglobin A1C (%)   Date Value   10/03/2021 7.6 (H)   02/11/2021 7.1 (H)   09/03/2020 7.4 (H)     LDL Cholesterol Calculated (mg/dL)   Date Value   10/03/2021 62   08/14/2020 87   02/12/2014 90            Review of Systems   Constitutional, HEENT, cardiovascular, pulmonary, gi and gu systems are negative, except as otherwise noted.      Objective    /78 (BP Location: Right arm, Patient Position: Chair, Cuff Size: Adult Large)   Pulse 78   Temp 98.6  F (37  C) (Tympanic)   Resp 14   Ht 1.646 m (5' 4.8\")   Wt 84.4 kg (186 lb)   LMP 12/10/2014   SpO2 99%   Breastfeeding No   BMI 31.14 kg/m    Body mass index is 31.14 kg/m .  Physical Exam   GENERAL: healthy, alert and no distress  MS: extremities normal- no gross deformities noted. Swelling noted of bilateral knees without ecchymosis or erythremia.  Healing abrasions on both knees.  Pain with flexion and extension as well as palpation over the patella.  Varus and valgus testing positive but over the patella does not feel to be ligament injury.              No results found for this or any previous visit (from the past 24 hour(s)).      XR Knee Bilateral 3 Views    Result Date: 10/4/2022  XR KNEE BILATERAL 3 VIEWS  10/4/2022 10:40 AM HISTORY: Injury of left knee, initial encounter; Injury of right knee, initial encounter COMPARISON: None.     IMPRESSION:  No fracture or knee joint effusion bilaterally. Mild narrowing and hypertrophic change in the medial compartment of the right knee. Normal patellar alignment bilaterally. PAIGE MÁRQUEZ MD   SYSTEM ID:  [id number]    "

## 2022-10-06 ENCOUNTER — MYC MEDICAL ADVICE (OUTPATIENT)
Dept: FAMILY MEDICINE | Facility: CLINIC | Age: 47
End: 2022-10-06

## 2022-10-06 DIAGNOSIS — E11.9 TYPE 2 DIABETES MELLITUS WITHOUT COMPLICATION, WITHOUT LONG-TERM CURRENT USE OF INSULIN (H): Primary | ICD-10-CM

## 2022-10-07 RX ORDER — METFORMIN HCL 500 MG
TABLET, EXTENDED RELEASE 24 HR ORAL
Qty: 381 TABLET | Refills: 0 | Status: SHIPPED | OUTPATIENT
Start: 2022-10-07 | End: 2022-12-26

## 2022-10-07 NOTE — TELEPHONE ENCOUNTER
See my chart message    Patient would like to start GLP-1.    Messaged back to determine pharmacy    Monse Carrillo RN on 10/7/2022 at 10:36 AM

## 2022-10-21 ENCOUNTER — MYC MEDICAL ADVICE (OUTPATIENT)
Dept: FAMILY MEDICINE | Facility: CLINIC | Age: 47
End: 2022-10-21

## 2022-10-21 DIAGNOSIS — E11.9 TYPE 2 DIABETES MELLITUS WITHOUT COMPLICATION, WITHOUT LONG-TERM CURRENT USE OF INSULIN (H): ICD-10-CM

## 2022-10-21 RX ORDER — ISOPROPYL ALCOHOL 0.75 G/1
SWAB TOPICAL
Qty: 100 EACH | Refills: 3 | Status: SHIPPED | OUTPATIENT
Start: 2022-10-21

## 2022-10-21 RX ORDER — LANCETS
EACH MISCELLANEOUS
Qty: 100 EACH | Refills: 2 | Status: SHIPPED | OUTPATIENT
Start: 2022-10-21

## 2022-10-21 NOTE — TELEPHONE ENCOUNTER
"Prescription approved per [facility] Refill Protocol.    Pending Prescriptions:                       Disp   Refills    Alcohol Swabs (B-D SINGLE USE SWABS REGUL*100 ea*3            Sig: USE TO SWAB AREA OF INJECTION/JUVE AS DIRECTED    blood glucose (NO BRAND SPECIFIED) test s*100 st*2            Sig: Accu check Muna Use to test blood sugar 1 times           daily or as directed. To accompany: Blood Glucose           Monitor Brands: per insurance.    thin (NO BRAND SPECIFIED) lancets         100 ea*2            Sig: Use with lanceting device. To accompany: Blood           Glucose Monitor Brands: per insurance.      Requested Prescriptions   Pending Prescriptions Disp Refills     Alcohol Swabs (B-D SINGLE USE SWABS REGULAR) PADS 100 each 3     Sig: USE TO SWAB AREA OF INJECTION/JUVE AS DIRECTED       There is no refill protocol information for this order        blood glucose (NO BRAND SPECIFIED) test strip 100 strip 2     Sig: Accu check Muna Use to test blood sugar 1 times daily or as directed. To accompany: Blood Glucose Monitor Brands: per insurance.       Diabetic Supplies Protocol Passed - 10/21/2022  3:20 PM        Passed - Medication is active on med list        Passed - Patient is 18 years of age or older        Passed - Recent (6 mo) or future (30 days) visit within the authorizing provider's specialty     Patient had office visit in the last 6 months or has a visit in the next 30 days with authorizing provider.  See \"Patient Info\" tab in inbasket, or \"Choose Columns\" in Meds & Orders section of the refill encounter.               thin (NO BRAND SPECIFIED) lancets 100 each 2     Sig: Use with lanceting device. To accompany: Blood Glucose Monitor Brands: per insurance.       Diabetic Supplies Protocol Passed - 10/21/2022  3:20 PM        Passed - Medication is active on med list        Passed - Patient is 18 years of age or older        Passed - Recent (6 mo) or future (30 days) visit within the authorizing " "provider's specialty     Patient had office visit in the last 6 months or has a visit in the next 30 days with authorizing provider.  See \"Patient Info\" tab in inbasket, or \"Choose Columns\" in Meds & Orders section of the refill encounter.               Monse Carrillo RN on 10/21/2022 at 3:23 PM    "

## 2022-11-19 ENCOUNTER — HEALTH MAINTENANCE LETTER (OUTPATIENT)
Age: 47
End: 2022-11-19

## 2022-12-26 ENCOUNTER — OFFICE VISIT (OUTPATIENT)
Dept: FAMILY MEDICINE | Facility: CLINIC | Age: 47
End: 2022-12-26
Payer: COMMERCIAL

## 2022-12-26 VITALS
WEIGHT: 172 LBS | HEART RATE: 84 BPM | BODY MASS INDEX: 27.64 KG/M2 | HEIGHT: 66 IN | TEMPERATURE: 98.2 F | OXYGEN SATURATION: 98 % | RESPIRATION RATE: 14 BRPM | SYSTOLIC BLOOD PRESSURE: 134 MMHG | DIASTOLIC BLOOD PRESSURE: 86 MMHG

## 2022-12-26 DIAGNOSIS — Z00.00 ROUTINE GENERAL MEDICAL EXAMINATION AT A HEALTH CARE FACILITY: Primary | ICD-10-CM

## 2022-12-26 DIAGNOSIS — Z12.31 VISIT FOR SCREENING MAMMOGRAM: ICD-10-CM

## 2022-12-26 DIAGNOSIS — E11.9 TYPE 2 DIABETES MELLITUS WITHOUT COMPLICATION, WITHOUT LONG-TERM CURRENT USE OF INSULIN (H): ICD-10-CM

## 2022-12-26 DIAGNOSIS — Z12.11 SCREEN FOR COLON CANCER: ICD-10-CM

## 2022-12-26 LAB — HBA1C MFR BLD: 6.3 % (ref 0–5.6)

## 2022-12-26 PROCEDURE — 99213 OFFICE O/P EST LOW 20 MIN: CPT | Mod: 25 | Performed by: NURSE PRACTITIONER

## 2022-12-26 PROCEDURE — 36415 COLL VENOUS BLD VENIPUNCTURE: CPT | Performed by: NURSE PRACTITIONER

## 2022-12-26 PROCEDURE — 83036 HEMOGLOBIN GLYCOSYLATED A1C: CPT | Performed by: NURSE PRACTITIONER

## 2022-12-26 PROCEDURE — 99396 PREV VISIT EST AGE 40-64: CPT | Mod: 25 | Performed by: NURSE PRACTITIONER

## 2022-12-26 RX ORDER — METFORMIN HCL 500 MG
1000 TABLET, EXTENDED RELEASE 24 HR ORAL 2 TIMES DAILY WITH MEALS
Qty: 360 TABLET | Refills: 1 | Status: SHIPPED | OUTPATIENT
Start: 2022-12-26 | End: 2023-10-20

## 2022-12-26 ASSESSMENT — ENCOUNTER SYMPTOMS
CHILLS: 0
FEVER: 0
DYSURIA: 0
HEMATOCHEZIA: 0
JOINT SWELLING: 0
ABDOMINAL PAIN: 0
PARESTHESIAS: 0
SORE THROAT: 0
DIZZINESS: 0
DIARRHEA: 1
CONSTIPATION: 0
ARTHRALGIAS: 1
HEARTBURN: 1
COUGH: 0
WEAKNESS: 0
SHORTNESS OF BREATH: 0
HEADACHES: 0
NERVOUS/ANXIOUS: 0
PALPITATIONS: 0
NAUSEA: 0
FREQUENCY: 0
EYE PAIN: 0
HEMATURIA: 0
MYALGIAS: 0

## 2022-12-26 ASSESSMENT — PAIN SCALES - GENERAL: PAINLEVEL: NO PAIN (0)

## 2022-12-26 NOTE — PROGRESS NOTES
SUBJECTIVE:   CC: Alia is an 47 year old who presents for preventive health visit.   Patient has been advised of split billing requirements and indicates understanding: Yes     Healthy Habits:     Getting at least 3 servings of Calcium per day:  Yes    Bi-annual eye exam:  Yes    Dental care twice a year:  Yes    Sleep apnea or symptoms of sleep apnea:  None    Diet:  Low salt, Low fat/cholesterol and Diabetic    Frequency of exercise:  2-3 days/week    Duration of exercise:  Less than 15 minutes    Taking medications regularly:  Yes    Medication side effects:  None    PHQ-2 Total Score: 0    Additional concerns today:  No    Diabetes Follow-up  How often are you checking your blood sugar? A few times a week  What time of day are you checking your blood sugars (select all that apply)?  Before meals  Have you had any blood sugars above 200?  No  Have you had any blood sugars below 70?  No    What symptoms do you notice when your blood sugar is low?  None    What concerns do you have today about your diabetes? None     Do you have any of these symptoms? (Select all that apply)  No numbness or tingling in feet.  No redness, sores or blisters on feet.  No complaints of excessive thirst.  No reports of blurry vision.  No significant changes to weight.    Have you had a diabetic eye exam in the last 12 months? No    avg 115 blood glucose.     BP Readings from Last 2 Encounters:   12/26/22 134/86   10/04/22 138/78     Hemoglobin A1C (%)   Date Value   12/26/2022 6.3 (H)   10/04/2022 8.1 (H)   02/11/2021 7.1 (H)   09/03/2020 7.4 (H)     LDL Cholesterol Calculated (mg/dL)   Date Value   10/04/2022 44   10/03/2021 62   08/14/2020 87   02/12/2014 90                 Today's PHQ-2 Score:   PHQ-2 ( 1999 [employer]) 12/26/2022   Q1: Little interest or pleasure in doing things 0   Q2: Feeling down, depressed or hopeless 0   PHQ-2 Score 0   PHQ-2 Total Score (12-17 Years)- Positive if 3 or more points; Administer PHQ-A if positive  -   Q1: Little interest or pleasure in doing things Not at all   Q2: Feeling down, depressed or hopeless Not at all   PHQ-2 Score 0       Have you ever done Advance Care Planning? (For example, a Health Directive, POLST, or a discussion with a medical provider or your loved ones about your wishes): No, advance care planning information given to patient to review.  Patient declined advance care planning discussion at this time.    Social History     Tobacco Use     Smoking status: Never     Smokeless tobacco: Never   Substance Use Topics     Alcohol use: Yes     Comment: occassional      If you drink alcohol do you typically have >3 drinks per day or >7 drinks per week? No    Alcohol Use 2022   Prescreen: >3 drinks/day or >7 drinks/week? No   Prescreen: >3 drinks/day or >7 drinks/week? -       Reviewed orders with patient.  Reviewed health maintenance and updated orders accordingly - Yes  Lab work is in process  Labs reviewed in EPIC  BP Readings from Last 3 Encounters:   22 134/86   10/04/22 138/78   21 136/86    Wt Readings from Last 3 Encounters:   22 78 kg (172 lb)   10/04/22 84.4 kg (186 lb)   21 85.5 kg (188 lb 6.4 oz)                  Patient Active Problem List   Diagnosis     Polycystic ovaries     Hypertension goal BP (blood pressure) < 140/90     CARDIOVASCULAR SCREENING; LDL GOAL LESS THAN 160     Lactose intolerance     TONY (obstructive sleep apnea)     S/P hysterectomy     Diabetes mellitus, type 2 (H)     Past Surgical History:   Procedure Laterality Date      SECTION  2005    @ 24 weeks      SECTION      @ 29 weeks     D & C  3/4/08     LAPAROSCOPIC CHOLECYSTECTOMY WITH CHOLANGIOGRAMS N/A 2018    Procedure: LAPAROSCOPIC CHOLECYSTECTOMY WITH CHOLANGIOGRAMS;  Laparoscopic CHOLECYSTECTOMY;  Surgeon: Baltazar Pickering MD;  Location: [facility]     LAPAROSCOPIC HYSTERECTOMY TOTAL, BILATERAL SALPINGO-OOPHORECTOMY, COMBINED N/A 2014    tlh, bilateral  salpingectomy     LASER HOLMIUM LITHOTRIPSY URETER(S), INSERT STENT, COMBINED Right 11/15/2018    Procedure: COMBINED CYSTOSCOPY, RIGHT URETEROSCOPY, LASER HOLMIUM LITHOTRIPSY URETER(S), INSERT RIGHT  STENT;  Surgeon: Lorna Sellers MD;  Location:  OR       Social History     Tobacco Use     Smoking status: Never     Smokeless tobacco: Never   Substance Use Topics     Alcohol use: Yes     Comment: occassional      Family History   Problem Relation Age of Onset     Diabetes Maternal Grandmother      Heart Disease Maternal Grandmother         Open heart surgery, pacemaker     Circulatory Maternal Grandmother         Blood clot     Hypertension Maternal Grandmother         TONY     Thyroid Disease Maternal Grandmother         hypothyroidism     Heart Disease Paternal Grandfather      Hypertension Paternal Grandfather      Alcohol/Drug Paternal Grandfather         recovering     Hypertension Mother      Gynecology Mother         fibroids     Diabetes Mother         boarderline     Hypertension Father      Alcohol/Drug Maternal Grandfather         ETOH abuse     Eye Disorder Son         near sided     Thyroid Disease Son         hypothyroidism/hypothyroidism     Autism Spectrum Disorder Son         Dx 10/2015     Depression Son 11     Anxiety Disorder Son      Gynecology Sister         Fibroids     Eye Disorder Daughter         far sided     Thyroid Disease Daughter         hypothyroidism     Endocrine Disease Daughter         Growth delay, starting shots 8/2016     Ear Disorder Daughter         hearing aid in left ear     Cancer - colorectal Paternal Uncle         in his 40's, also lung     Other - See Comments Daughter 2        snored         Current Outpatient Medications   Medication Sig Dispense Refill     acetaminophen (TYLENOL) 500 MG tablet Take 1,000 mg by mouth every 6 hours as needed for mild pain       Alcohol Swabs (B-D SINGLE USE SWABS REGULAR) PADS USE TO SWAB AREA OF INJECTION/JUVE AS  DIRECTED 100 each 3     atenolol (TENORMIN) 50 MG tablet Take 1 tablet (50 mg) by mouth daily 90 tablet 3     atorvastatin (LIPITOR) 20 MG tablet Take 1 tablet (20 mg) by mouth daily 90 tablet 3     blood glucose (NO BRAND SPECIFIED) test strip Accu check Muna Use to test blood sugar 1 times daily or as directed. To accompany: Blood Glucose Monitor Brands: per insurance. 100 strip 2     blood glucose calibration (NO BRAND SPECIFIED) solution To accompany: Blood Glucose Monitor Brands: per insurance. 1 Bottle 3     blood glucose monitoring (NO BRAND SPECIFIED) meter device kit Use to test blood sugar 1 times daily or as directed. Blood Glucose Monitor Brands: per insurance. 1 kit 0     dulaglutide (TRULICITY) 0.75 MG/0.5ML pen Inject 0.75 mg Subcutaneous every 7 days 2 mL 3     ibuprofen (ADVIL,MOTRIN) 200 MG tablet Take 400 mg by mouth every 6 hours as needed        loratadine (CLARITIN) 10 MG tablet Take 10 mg by mouth daily as needed        metFORMIN (GLUCOPHAGE XR) 500 MG 24 hr tablet Take 2 tablets (1,000 mg) by mouth 2 times daily (with meals) 360 tablet 1     ORDER FOR [facility]erc[facility] Remstar 60 series auto 5-15 cm H2O, wisp s/m       thin (NO BRAND SPECIFIED) lancets Use with lanceting device. To accompany: Blood Glucose Monitor Brands: per insurance. 100 each 2     Allergies   Allergen Reactions     Nkda [No Known Drug Allergies]      Recent Labs   Lab Test 12/26/22  0919 10/04/22  1039 10/03/21  1034 02/11/21  1512 09/03/20  1210 09/03/20  1210 08/14/20  0851 11/10/18  1410 06/20/18  1637 12/25/14  0635 11/03/14  1054   A1C 6.3* 8.1* 7.6* 7.1*   < > 7.4*  --   --   --   --   --    LDL  --  44 62  --   --   --  87  --   --   --   --    HDL  --  42* 46*  --   --   --  36*  --   --   --   --    TRIG  --  201* 152*  --   --   --  300*  --   --   --   --    ALT  --  27  --   --   --   --   --  35 48  --   --    CR  --  0.71  --  0.75  --   --   --  0.71 0.79   < >  --    GFRESTIMATED  --  >90  --  >90  --   --    --  >90 80   < >  --    GFRESTBLACK  --   --   --  >90  --   --   --  >90 >90   < >  --    POTASSIUM  --  4.3  --  3.8  --   --   --  4.0 3.7   < >  --    TSH  --   --   --   --   --  1.53  --   --   --   --  1.84    < > = values in this interval not displayed.        Breast Cancer Screening:    FHS-7:   Breast CA Risk Assessment (FHS-7) 2022   Did any of your first-degree relatives have breast or ovarian cancer? No   Did any of your relatives have bilateral breast cancer? No   Did any man in your family have breast cancer? No   Did any woman in your family have breast and ovarian cancer? Yes   Did any woman in your family have breast cancer before age 50 y? No   Do you have 2 or more relatives with breast and/or ovarian cancer? No   Do you have 2 or more relatives with breast and/or bowel cancer? No     click delete button to remove this line now  Mammogram Screening: Recommended annual mammography  Pertinent mammograms are reviewed under the imaging tab.    History of abnormal Pap smear: Status post benign hysterectomy. Health Maintenance and Surgical History updated.  PAP / HPV 2011   PAP (Historical) NIL NIL NIL     Reviewed and updated as needed this visit by clinical staff   Tobacco  Allergies  Meds  Problems  Med Hx  Surg Hx  Fam Hx          Reviewed and updated as needed this visit by Provider   Tobacco  Allergies  Meds  Problems  Med Hx  Surg Hx  Fam Hx         Past Medical History:   Diagnosis Date     Gestational diabetes      Incompetence of cervix 2008-cerclage placed, Dr. Quezada following pt with mfm      Preeclampsia      Sleep apnea     HAS CPAP      Past Surgical History:   Procedure Laterality Date      SECTION  2005    @ 24 weeks      SECTION      @ 29 weeks     D & C  3/4/08     LAPAROSCOPIC CHOLECYSTECTOMY WITH CHOLANGIOGRAMS N/A 2018    Procedure: LAPAROSCOPIC CHOLECYSTECTOMY WITH CHOLANGIOGRAMS;   "Laparoscopic CHOLECYSTECTOMY;  Surgeon: Baltazar Pickering MD;  Location: [facility]     LAPAROSCOPIC HYSTERECTOMY TOTAL, BILATERAL SALPINGO-OOPHORECTOMY, COMBINED N/A 12/24/2014    tlh, bilateral salpingectomy     LASER HOLMIUM LITHOTRIPSY URETER(S), INSERT STENT, COMBINED Right 11/15/2018    Procedure: COMBINED CYSTOSCOPY, RIGHT URETEROSCOPY, LASER HOLMIUM LITHOTRIPSY URETER(S), INSERT RIGHT  STENT;  Surgeon: Lorna Sellers MD;  Location:  OR       Review of Systems   Constitutional: Negative for chills and fever.   HENT: Negative for congestion, ear pain, hearing loss and sore throat.    Eyes: Negative for pain and visual disturbance.   Respiratory: Negative for cough and shortness of breath.    Cardiovascular: Negative for chest pain, palpitations and peripheral edema.   Gastrointestinal: Positive for diarrhea and heartburn. Negative for abdominal pain, constipation, hematochezia and nausea.   Genitourinary: Negative for dysuria, frequency, genital sores, hematuria and urgency.   Musculoskeletal: Positive for arthralgias. Negative for joint swelling and myalgias.   Skin: Negative for rash.   Neurological: Negative for dizziness, weakness, headaches and paresthesias.   Psychiatric/Behavioral: Negative for mood changes. The patient is not nervous/anxious.         OBJECTIVE:   /86   Pulse 84   Temp 98.2  F (36.8  C) (Tympanic)   Resp 14   Ht 1.67 m (5' 5.75\")   Wt 78 kg (172 lb)   LMP 12/10/2014   SpO2 98%   BMI 27.97 kg/m       Physical Exam  GENERAL: healthy, alert and no distress  EYES: Eyes grossly normal to inspection, PERRL and conjunctivae and sclerae normal  HENT: ear canals and TM's normal, nose and mouth without ulcers or lesions  NECK: no adenopathy, no asymmetry, masses, or scars and thyroid normal to palpation  RESP: lungs clear to auscultation - no rales, rhonchi or wheezes  BREAST: normal without masses, tenderness or nipple discharge and no palpable axillary masses or " adenopathy  CV: regular rate and rhythm, normal S1 S2, no S3 or S4, no murmur, click or rub, no peripheral edema and peripheral pulses strong  ABDOMEN: soft, nontender, no hepatosplenomegaly, no masses and bowel sounds normal  MS: no gross musculoskeletal defects noted, no edema  SKIN: no suspicious lesions or rashes  NEURO: Normal strength and tone, mentation intact and speech normal  PSYCH: mentation appears normal, affect normal/bright    Diagnostic Test Results:  Labs reviewed in Epic    ASSESSMENT/PLAN:       ICD-10-CM    1. Routine general medical examination at a health care facility  Z00.00       2. Type 2 diabetes mellitus without complication, without long-term current use of insulin (H)  E11.9 dulaglutide (TRULICITY) 0.75 MG/0.5ML pen     metFORMIN (GLUCOPHAGE XR) 500 MG 24 hr tablet     Hemoglobin A1c     Hemoglobin A1c      3. Visit for screening mammogram  Z12.31 MA SCREENING DIGITAL BILAT - Future  (s+30)      4. Screen for colon cancer  Z12.11 Colonoscopy Screening  Referral        Healthy female exam completed today. Discussed lifestyle modifications including weight loss/ management, eating a balanced diet, and getting regular cardiovascular exercise. Discussed self breast exams and how to complete these. Labs will recheck hemoglobin a1c today.     Doing well with diabetes management. Tolerating medications with some stomach upset. Has had weight loss which has been acceptable to the patient. Hemoglobin a1c significantly improved.     Plan yearly annual physicals or sooner as needed.    Results for orders placed or performed in visit on 12/26/22 (from the past 24 hour(s))   Hemoglobin A1c   Result Value Ref Range    Hemoglobin A1C 6.3 (H) 0.0 - 5.6 %       Patient has been advised of split billing requirements and indicates understanding: Yes      COUNSELING:  Reviewed preventive health counseling, as reflected in patient instructions      BMI:   Estimated body mass index is 27.97 kg/m  as  "calculated from the following:    Height as of this encounter: 1.67 m (5' 5.75\").    Weight as of this encounter: 78 kg (172 lb).   Weight management plan: Discussed healthy diet and exercise guidelines      She reports that she has never smoked. She has never used smokeless tobacco.        RIO Alex CNP  [facility]  "

## 2023-02-17 ENCOUNTER — HOSPITAL ENCOUNTER (EMERGENCY)
Facility: CLINIC | Age: 48
Discharge: HOME | End: 2023-02-17
Attending: PHYSICIAN ASSISTANT | Admitting: PHYSICIAN ASSISTANT
Payer: COMMERCIAL

## 2023-02-17 ENCOUNTER — APPOINTMENT (OUTPATIENT)
Dept: GENERAL RADIOLOGY | Facility: CLINIC | Age: 48
End: 2023-02-17
Attending: PHYSICIAN ASSISTANT
Payer: COMMERCIAL

## 2023-02-17 VITALS
OXYGEN SATURATION: 100 % | DIASTOLIC BLOOD PRESSURE: 70 MMHG | SYSTOLIC BLOOD PRESSURE: 147 MMHG | TEMPERATURE: 97.9 F | RESPIRATION RATE: 12 BRPM | BODY MASS INDEX: 27.65 KG/M2 | HEART RATE: 73 BPM | WEIGHT: 170 LBS
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
## 2023-02-17 DIAGNOSIS — S59.911A FOREARM INJURY, RIGHT, INITIAL ENCOUNTER: ICD-10-CM

## 2023-02-17 PROCEDURE — G0463 HOSPITAL OUTPT CLINIC VISIT: HCPCS | Performed by: PHYSICIAN ASSISTANT

## 2023-02-17 PROCEDURE — 99213 OFFICE O/P EST LOW 20 MIN: CPT | Performed by: PHYSICIAN ASSISTANT

## 2023-02-17 PROCEDURE — 73090 X-RAY EXAM OF FOREARM: CPT | Mod: RT

## 2023-02-17 ASSESSMENT — ACTIVITIES OF DAILY LIVING (ADL): ADLS_ACUITY_SCORE: 35
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
## 2023-02-17 NOTE — DISCHARGE INSTRUCTIONS
Ice, rest, elevate, Tylenol and ibuprofen over-the-counter as needed for pain.      Keep skin abrasion clean with bacitracin and bandage as needed.    Go to the emergency department if chest pain, shortness of breath, heart racing or skipping beats, abdominal pain, change or worsening of symptoms occur including headache, confusion, neck pain, back pain.     Follow-up with primary care doctor in 1 week if symptoms persist or fail to improve in the forearm and wrist

## 2023-02-18 ASSESSMENT — ENCOUNTER SYMPTOMS
DIFFICULTY URINATING: 0
CONFUSION: 0
NECK STIFFNESS: 0
ABDOMINAL PAIN: 0
VOMITING: 0
WOUND: 1
BLOOD IN STOOL: 0
SORE THROAT: 0
CHEST TIGHTNESS: 0
HEADACHES: 0
SEIZURES: 0
SHORTNESS OF BREATH: 0
FEVER: 0
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
## 2023-03-16 ENCOUNTER — TELEPHONE (OUTPATIENT)
Dept: FAMILY MEDICINE | Facility: CLINIC | Age: 48
End: 2023-03-16
Payer: COMMERCIAL

## 2023-03-16 NOTE — TELEPHONE ENCOUNTER
Patient Quality Outreach    Patient is due for the following:   Diabetes -  Eye Exam and Foot Exam  Colon Cancer Screening  Breast Cancer Screening - Mammogram      Topic Date Due     Pneumococcal Vaccine (2 - PCV) 09/15/2021     COVID-19 Vaccine (4 - Booster for Moderna series) 02/25/2022       Next Steps:   Schedule a Adult Preventative    Type of outreach:    Sent [employer] message.    Next Steps:  Reach out within 90 days via [employer].    Max number of attempts reached: No. Will try again in 90 days if patient still on fail list.    Questions for provider review:    None     Sienna Mitchell MA

## 2023-06-28 ENCOUNTER — TELEPHONE (OUTPATIENT)
Dept: FAMILY MEDICINE | Facility: CLINIC | Age: 48
End: 2023-06-28
Payer: COMMERCIAL

## 2023-06-28 DIAGNOSIS — E11.9 TYPE 2 DIABETES MELLITUS WITHOUT COMPLICATION, WITHOUT LONG-TERM CURRENT USE OF INSULIN (H): ICD-10-CM

## 2023-06-29 RX ORDER — DULAGLUTIDE 0.75 MG/.5ML
INJECTION, SOLUTION SUBCUTANEOUS
Qty: 4 ML | Refills: 0 | OUTPATIENT
Start: 2023-06-29

## 2023-06-29 NOTE — TELEPHONE ENCOUNTER
"Alia is due for a diabetic follow-up appointment. Thank you!    Poly Wilson, RN      Requested Prescriptions   Pending Prescriptions Disp Refills     TRULICITY 0.75 MG/0.5ML pen [Pharmacy Med Name: Trulicity 0.75 MG/0.5ML Subcutaneous Solution Pen-injector] 4 mL 0     Sig: INJECT 0.75 MG SUBCUTANEOUS EVERY 7 DAYS       GLP-1 Agonists Protocol Failed - 6/28/2023  8:22 PM        Failed - HgbA1C in past 3 or 6 months     If HgbA1C is 8 or greater, it needs to be on file within the past 3 months.  If less than 8, must be on file within the past 6 months.     Recent Labs   Lab Test 12/26/22  0919   A1C 6.3*             Failed - Recent (6 mo) or future (30 days) visit within the authorizing provider's specialty     Patient had office visit in the last 6 months or has a visit in the next 30 days with authorizing provider.  See \"Patient Info\" tab in inbasket, or \"Choose Columns\" in Meds & Orders section of the refill encounter.            Passed - Medication is active on med list        Passed - Patient is age 18 or older        Passed - No active pregnancy on record        Passed - Normal serum creatinine on file in past 12 months     Recent Labs   Lab Test 10/04/22  1039   CR 0.71       Ok to refill medication if creatinine is low          Passed - No positive pregnancy test in past 12 months             "

## 2023-07-02 ENCOUNTER — HEALTH MAINTENANCE LETTER (OUTPATIENT)
Age: 48
End: 2023-07-02

## 2023-07-13 ENCOUNTER — OFFICE VISIT (OUTPATIENT)
Dept: FAMILY MEDICINE | Facility: CLINIC | Age: 48
End: 2023-07-13
Payer: COMMERCIAL

## 2023-07-13 ENCOUNTER — LAB (OUTPATIENT)
Dept: LAB | Facility: CLINIC | Age: 48
End: 2023-07-13
Payer: COMMERCIAL

## 2023-07-13 VITALS
WEIGHT: 171 LBS | TEMPERATURE: 98.4 F | BODY MASS INDEX: 27.48 KG/M2 | HEART RATE: 66 BPM | SYSTOLIC BLOOD PRESSURE: 108 MMHG | RESPIRATION RATE: 14 BRPM | HEIGHT: 66 IN | DIASTOLIC BLOOD PRESSURE: 84 MMHG | OXYGEN SATURATION: 99 %

## 2023-07-13 DIAGNOSIS — Z12.11 SCREEN FOR COLON CANCER: ICD-10-CM

## 2023-07-13 DIAGNOSIS — E11.9 TYPE 2 DIABETES MELLITUS WITHOUT COMPLICATION, WITHOUT LONG-TERM CURRENT USE OF INSULIN (H): Primary | ICD-10-CM

## 2023-07-13 DIAGNOSIS — E11.9 DIABETES MELLITUS, TYPE 2 (H): ICD-10-CM

## 2023-07-13 DIAGNOSIS — Z79.52 LONG TERM CURRENT USE OF SYSTEMIC STEROIDS: ICD-10-CM

## 2023-07-13 LAB — HBA1C MFR BLD: 6.2 % (ref 0–5.6)

## 2023-07-13 PROCEDURE — 0134A COVID-19 BIVALENT 18+ (MODERNA): CPT | Performed by: NURSE PRACTITIONER

## 2023-07-13 PROCEDURE — 83036 HEMOGLOBIN GLYCOSYLATED A1C: CPT

## 2023-07-13 PROCEDURE — 36415 COLL VENOUS BLD VENIPUNCTURE: CPT

## 2023-07-13 PROCEDURE — 99213 OFFICE O/P EST LOW 20 MIN: CPT | Mod: 25 | Performed by: NURSE PRACTITIONER

## 2023-07-13 PROCEDURE — 91313 COVID-19 BIVALENT 18+ (MODERNA): CPT | Performed by: NURSE PRACTITIONER

## 2023-07-13 ASSESSMENT — PAIN SCALES - GENERAL: PAINLEVEL: NO PAIN (0)

## 2023-07-13 NOTE — PROGRESS NOTES
"  Assessment & Plan     Type 2 diabetes mellitus without complication, without long-term current use of insulin (H)  Stable doing well with diabetes management. Tolerating trulicity well. Increased dose today to help with further weight loss.   - dulaglutide (TRULICITY) 1.5 MG/0.5ML pen; Inject 1.5 mg Subcutaneous every 7 days    Screen for colon cancer  - Colonoscopy Screening  Referral; Future    Long term current use of systemic steroids  Will discuss future dexa scan with patient she is not currently on any steroids.                BMI:   Estimated body mass index is 27.6 kg/m  as calculated from the following:    Height as of this encounter: 1.676 m (5' 6\").    Weight as of this encounter: 77.6 kg (171 lb).           RIO Alex CNP  [facility]    Keegan Rojo is a 47 year old, presenting for the following health issues:  Health Maintenance (Advised patient of . Will get Moderna booster and PCV20 today.) and Diabetes        7/13/2023    10:30 AM   Additional Questions   Roomed by Sienna CAMACHO CMA   Accompanied by self     History of Present Illness       Diabetes:   She presents for follow up of diabetes.  She is checking home blood glucose two times daily. She checks blood glucose before meals and at bedtime.  Blood glucose is never over 200 and never under 70. When her blood glucose is low, the patient is asymptomatic for confusion, blurred vision, lethargy and reports not feeling dizzy, shaky, or weak.  She has no concerns regarding her diabetes at this time.  She is not experiencing numbness or burning in feet, excessive thirst, blurry vision, weight changes or redness, sores or blisters on feet. The patient has not had a diabetic eye exam in the last 12 months.         She eats 2-3 servings of fruits and vegetables daily.She consumes 0 sweetened beverage(s) daily.She exercises with enough effort to increase her heart rate 20 to 29 minutes per day.  She " "exercises with enough effort to increase her heart rate 3 or less days per week.   She is taking medications regularly.     No concerns today. Feels that her diabetes is well controlled. She reports that her summer is going fast.     Review of Systems   Constitutional, HEENT, cardiovascular, pulmonary, gi and gu systems are negative, except as otherwise noted.      Objective    /84 (BP Location: Right arm, Patient Position: Sitting, Cuff Size: Adult Regular)   Pulse 66   Temp 98.4  F (36.9  C) (Tympanic)   Resp 14   Ht 1.676 m (5' 6\")   Wt 77.6 kg (171 lb)   LMP 12/10/2014   SpO2 99%   BMI 27.60 kg/m    Body mass index is 27.6 kg/m .  Physical Exam   GENERAL: healthy, alert and no distress  NECK: no adenopathy, no asymmetry, masses, or scars and thyroid normal to palpation  RESP: lungs clear to auscultation - no rales, rhonchi or wheezes  CV: regular rate and rhythm, normal S1 S2, no S3 or S4, no murmur, click or rub, no peripheral edema and peripheral pulses strong  ABDOMEN: soft, nontender, no hepatosplenomegaly, no masses and bowel sounds normal  MS: no gross musculoskeletal defects noted, no edema                    "

## 2023-10-20 DIAGNOSIS — I10 HTN, GOAL BELOW 130/80: ICD-10-CM

## 2023-10-20 DIAGNOSIS — E11.9 TYPE 2 DIABETES MELLITUS WITHOUT COMPLICATION, WITHOUT LONG-TERM CURRENT USE OF INSULIN (H): ICD-10-CM

## 2023-10-20 RX ORDER — ATENOLOL 50 MG/1
50 TABLET ORAL DAILY
Qty: 90 TABLET | Refills: 0 | Status: SHIPPED | OUTPATIENT
Start: 2023-10-20 | End: 2024-02-08

## 2023-10-20 RX ORDER — ATORVASTATIN CALCIUM 20 MG/1
20 TABLET, FILM COATED ORAL DAILY
Qty: 90 TABLET | Refills: 0 | Status: SHIPPED | OUTPATIENT
Start: 2023-10-20 | End: 2024-02-08

## 2023-10-20 RX ORDER — METFORMIN HCL 500 MG
1000 TABLET, EXTENDED RELEASE 24 HR ORAL 2 TIMES DAILY WITH MEALS
Qty: 360 TABLET | Refills: 0 | Status: SHIPPED | OUTPATIENT
Start: 2023-10-20 | End: 2024-02-08

## 2023-10-20 NOTE — TELEPHONE ENCOUNTER
"Requested Prescriptions   Pending Prescriptions Disp Refills    metFORMIN (GLUCOPHAGE XR) 500 MG 24 hr tablet [Pharmacy Med Name: metFORMIN HCl  MG Oral Tablet Extended Release 24 Hour] 360 tablet 0     Sig: TAKE 2 TABLETS BY MOUTH TWICE DAILY WITH MEALS       Biguanide Agents Passed - 10/20/2023  9:35 AM        Passed - Patient is age 10 or older        Passed - Recent (12 mo) or future (30 days) visit within the authorizing provider's specialty      Patient has had an office visit with the authorizing provider or a provider within the authorizing providers department within the previous 12 mos or has a future within next 30 days. See \"Patient Info\" tab in inbasket, or \"Choose Columns\" in Meds & Orders section of the refill encounter.              Passed - Patient does NOT have a diagnosis of CHF.        Passed - Medication is active on med list        Passed - Patient is not pregnant        Passed - Patient has not had a positive pregnancy test within the past 12 mos.           atenolol (TENORMIN) 50 MG tablet [Pharmacy Med Name: Atenolol 50 MG Oral Tablet] 90 tablet 0     Sig: Take 1 tablet by mouth once daily       Beta-Blockers Protocol Passed - 10/20/2023  9:35 AM        Passed - Blood pressure under 140/90 in past 12 months     BP Readings from Last 3 Encounters:   07/13/23 108/84   02/17/23 (!) 147/70   12/26/22 134/86                 Passed - Patient is age 6 or older        Passed - Recent (12 mo) or future (30 days) visit within the authorizing provider's specialty     Patient has had an office visit with the authorizing provider or a provider within the authorizing providers department within the previous 12 mos or has a future within next 30 days. See \"Patient Info\" tab in inbasket, or \"Choose Columns\" in Meds & Orders section of the refill encounter.              Passed - Medication is active on med list          atorvastatin (LIPITOR) 20 MG tablet [Pharmacy Med Name: Atorvastatin Calcium 20 MG " "Oral Tablet] 90 tablet 0     Sig: Take 1 tablet by mouth once daily       Statins Protocol Failed - 10/20/2023  9:35 AM        Failed - LDL on file in past 12 months     Recent Labs   Lab Test 10/04/22  1039   LDL 44             Passed - No abnormal creatine kinase in past 12 months     No lab results found.             Passed - Recent (12 mo) or future (30 days) visit within the authorizing provider's specialty     Patient has had an office visit with the authorizing provider or a provider within the authorizing providers department within the previous 12 mos or has a future within next 30 days. See \"Patient Info\" tab in inbasket, or \"Choose Columns\" in Meds & Orders section of the refill encounter.              Passed - Medication is active on med list        Passed - Patient is age 18 or older        Passed - No active pregnancy on record        Passed - No positive pregnancy test in past 12 months             "

## 2023-11-27 ENCOUNTER — PATIENT OUTREACH (OUTPATIENT)
Dept: CARE COORDINATION | Facility: CLINIC | Age: 48
End: 2023-11-27
Payer: COMMERCIAL

## 2023-12-11 ENCOUNTER — PATIENT OUTREACH (OUTPATIENT)
Dept: CARE COORDINATION | Facility: CLINIC | Age: 48
End: 2023-12-11
Payer: COMMERCIAL

## 2023-12-22 ENCOUNTER — PATIENT OUTREACH (OUTPATIENT)
Dept: FAMILY MEDICINE | Facility: CLINIC | Age: 48
End: 2023-12-22
Payer: COMMERCIAL

## 2023-12-22 NOTE — TELEPHONE ENCOUNTER
Patient Quality Outreach    Patient is due for the following:   Colon Cancer Screening    Next Steps:   Schedule a office visit for colorectal screening    Type of outreach:    Sent [employer] message.    Next Steps:  Reach out within 90 days via [employer].    Max number of attempts reached: Yes. Will try again in 90 days if patient still on fail list.    Questions for provider review:    None           Eli Andrade MA

## 2024-01-22 ENCOUNTER — TRANSFERRED RECORDS (OUTPATIENT)
Dept: HEALTH INFORMATION MANAGEMENT | Facility: CLINIC | Age: 49
End: 2024-01-22
Payer: COMMERCIAL

## 2024-01-22 LAB — RETINOPATHY: NEGATIVE

## 2024-01-28 ENCOUNTER — HEALTH MAINTENANCE LETTER (OUTPATIENT)
Age: 49
End: 2024-01-28

## 2024-02-07 DIAGNOSIS — I10 HTN, GOAL BELOW 130/80: ICD-10-CM

## 2024-02-07 DIAGNOSIS — E11.9 TYPE 2 DIABETES MELLITUS WITHOUT COMPLICATION, WITHOUT LONG-TERM CURRENT USE OF INSULIN (H): ICD-10-CM

## 2024-02-08 RX ORDER — ATORVASTATIN CALCIUM 20 MG/1
20 TABLET, FILM COATED ORAL DAILY
Qty: 90 TABLET | Refills: 0 | Status: SHIPPED | OUTPATIENT
Start: 2024-02-08 | End: 2024-03-14

## 2024-02-08 RX ORDER — DULAGLUTIDE 1.5 MG/.5ML
1.5 INJECTION, SOLUTION SUBCUTANEOUS
Qty: 12 ML | Refills: 0 | Status: SHIPPED | OUTPATIENT
Start: 2024-02-08 | End: 2024-08-06

## 2024-02-08 RX ORDER — METFORMIN HCL 500 MG
1000 TABLET, EXTENDED RELEASE 24 HR ORAL 2 TIMES DAILY WITH MEALS
Qty: 360 TABLET | Refills: 0 | Status: SHIPPED | OUTPATIENT
Start: 2024-02-08 | End: 2024-03-14

## 2024-02-08 RX ORDER — ATENOLOL 50 MG/1
50 TABLET ORAL DAILY
Qty: 90 TABLET | Refills: 0 | Status: SHIPPED | OUTPATIENT
Start: 2024-02-08 | End: 2024-03-14

## 2024-02-08 NOTE — TELEPHONE ENCOUNTER
Requested Prescriptions   Pending Prescriptions Disp Refills    TRULICITY 1.5 MG/0.5ML pen [Pharmacy Med Name: Trulicity 1.5 MG/0.5ML Subcutaneous Solution Pen-injector] 12 mL 0     Sig: INJECT 1.5 MG SUBCUTANEOUS EVERY 7 DAYS       GLP-1 Agonists Protocol Failed - 2/7/2024  7:58 PM        Failed - HgbA1C in past 3 or 6 months     If HgbA1C is 8 or greater, it needs to be on file within the past 3 months.  If less than 8, must be on file within the past 6 months.     Recent Labs   Lab Test 07/13/23  1048   A1C 6.2*             Failed - Has GFR on file in past 12 months and most recent value is normal        Failed - Recent (6 mo) or future (90 days) visit within the authorizing provider's specialty     The patient must have completed an in-person or virtual visit within the past 6 months or has a future visit scheduled within the next 90 days with the authorizing provider s specialty.  Urgent care and e-visits do not quality as an office visit for this protocol.          Passed - Medication is active on med list        Passed - Medication indicated for associated diagnosis     Medication is associated with one or more of the following diagnoses:     Type 2 diabetes mellitus           Passed - Patient is age 18 or older        Passed - No active pregnancy on record        Passed - No positive pregnancy test in past 12 months              [] : No

## 2024-02-08 NOTE — TELEPHONE ENCOUNTER
Requested Prescriptions   Pending Prescriptions Disp Refills    atorvastatin (LIPITOR) 20 MG tablet [Pharmacy Med Name: Atorvastatin Calcium 20 MG Oral Tablet] 90 tablet 0     Sig: Take 1 tablet by mouth once daily       Antihyperlipidemic agents Failed - 2/7/2024  7:58 PM        Failed - Lipid panel on file in past 12 mos     Recent Labs   Lab Test 10/04/22  1039   CHOL 126   TRIG 201*   HDL 42*   LDL 44   NHDL 84               Failed - Normal serum ALT on record in past 12 mos     Recent Labs   Lab Test 10/04/22  1039   ALT 27             Passed - Recent (12 mo) or future (30 days) visit within the authorizing provider's specialty     The patient must have completed an in-person or virtual visit within the past 12 months or has a future visit scheduled within the next 90 days with the authorizing provider s specialty.  Urgent care and e-visits do not quality as an office visit for this protocol.          Passed - Medication is active on med list        Passed - Patient is age 18 years or older        Passed - No active pregnancy on record        Passed - No positive pregnancy test in past 12 mos          atenolol (TENORMIN) 50 MG tablet [Pharmacy Med Name: Atenolol 50 MG Oral Tablet] 90 tablet 0     Sig: Take 1 tablet by mouth once daily       Beta-Blockers Protocol Passed - 2/7/2024  7:58 PM        Passed - Blood pressure under 140/90 in past 12 months     BP Readings from Last 3 Encounters:   07/13/23 108/84   02/17/23 (!) 147/70   12/26/22 134/86                 Passed - Patient is age 6 or older        Passed - Recent (12 mo) or future (30 days) visit within the authorizing provider's specialty     The patient must have completed an in-person or virtual visit within the past 12 months or has a future visit scheduled within the next 90 days with the authorizing provider s specialty.  Urgent care and e-visits do not quality as an office visit for this protocol.          Passed - Medication is active on med list         Passed - Medication indicated for associated diagnosis     Medication is associated with one or more of the following diagnoses:     Hypertension (HTN)   Atrial fibrillation/flutter   Angina   ASCVD   Migraine   Heart Failure   Tremor   Anxiety   Ocular hypertension   Glaucoma            metFORMIN (GLUCOPHAGE XR) 500 MG 24 hr tablet [Pharmacy Med Name: metFORMIN HCl  MG Oral Tablet Extended Release 24 Hour] 360 tablet 0     Sig: TAKE 2 TABLETS BY MOUTH TWICE DAILY WITH MEALS       Biguanide Agents Failed - 2/7/2024  7:58 PM        Failed - Has GFR on file in past 12 months and most recent value is normal        Passed - Patient is age 10 or older        Passed - Recent (12 mo) or future (30 days) visit within the authorizing provider's specialty      The patient must have completed an in-person or virtual visit within the past 12 months or has a future visit scheduled within the next 90 days with the authorizing provider s specialty.  Urgent care and e-visits do not quality as an office visit for this protocol.          Passed - Patient does NOT have a diagnosis of CHF.        Passed - Medication is active on med list        Passed - Medication indicated for associated diagnosis     Medication is associated with one or more of the following diagnoses:     Gestational diabetes mellitus     Hyperinsulinar obesity     Hypersecretion of ovarian androgens    Non-alcoholic fatty liver    Polycystic ovarian syndrome               Pre-diabetes (DM 2 prevention)    Type 2 diabetes mellitus     Weight gain, antipsychotic therapy-induced             Passed - Patient is not pregnant        Passed - Patient has not had a positive pregnancy test within the past 12 mos.

## 2024-02-20 ENCOUNTER — E-VISIT (OUTPATIENT)
Dept: FAMILY MEDICINE | Facility: CLINIC | Age: 49
End: 2024-02-20
Payer: COMMERCIAL

## 2024-02-20 ENCOUNTER — LAB (OUTPATIENT)
Dept: FAMILY MEDICINE | Facility: CLINIC | Age: 49
End: 2024-02-20
Attending: NURSE PRACTITIONER
Payer: COMMERCIAL

## 2024-02-20 DIAGNOSIS — J06.9 VIRAL URI WITH COUGH: ICD-10-CM

## 2024-02-20 DIAGNOSIS — Z20.822 SUSPECTED COVID-19 VIRUS INFECTION: ICD-10-CM

## 2024-02-20 DIAGNOSIS — Z20.822 SUSPECTED COVID-19 VIRUS INFECTION: Primary | ICD-10-CM

## 2024-02-20 LAB
FLUAV RNA SPEC QL NAA+PROBE: POSITIVE
FLUBV RNA RESP QL NAA+PROBE: NEGATIVE
RSV RNA SPEC NAA+PROBE: NEGATIVE
SARS-COV-2 RNA RESP QL NAA+PROBE: NEGATIVE

## 2024-02-20 PROCEDURE — 87637 SARSCOV2&INF A&B&RSV AMP PRB: CPT

## 2024-02-20 PROCEDURE — 99421 OL DIG E/M SVC 5-10 MIN: CPT | Performed by: NURSE PRACTITIONER

## 2024-02-20 NOTE — PATIENT INSTRUCTIONS
Alia,    Your symptoms show that you may have COVID-19. This illness can cause fever, cough and trouble breathing. Many people get a mild case and get better on their own. Some people can get very sick.    Because you reported additional symptoms, I would like to also test you for Influenza and RSV.    What should I do?  I have placed orders for these tests.   To schedule: go to your [employer] home page and scroll down to the section that says  You have an appointment that needs to be scheduled  and click the large green button that says  Schedule Now  and follow the steps to find the next available openings.     If you are unable to complete these [employer] scheduling steps, please call [phone] to schedule your testing.     How do I self-isolate?  You isolate when you have symptoms of COVID or a test shows you have COVID, even if you don t have symptoms.   If you DO have symptoms:  Stay home and away from others  For at least 5 days after your symptoms started, AND   You are fever free for 24 hours (with no medicine that reduces fever), AND  Your other symptoms are better.  Wear a mask for 10 full days any time you are around others.  If you DON T have symptoms:  Stay at home and away from others for at least 5 days after your positive test.  Wear a mask for 10 full days any time you are around others.    How can I take care of myself?  Over the counter medications may help with your symptoms such as runny or stuffy nose, cough, chills, or fever. Talk to your care team about your options.     Some people are at high risk of severe illness (for example, you have a weak immune system, you re 50 years or older, or you have certain medical problems). If your risk is high and your symptoms started in the last 5 days, we strongly recommend for you to get COVID treatment as soon as possible. There are safe and effective medicines available that can make you feel better faster, and prevent hospitalization and death.    "    To discuss COVID treatment you can:  Call your clinic OR [phone] ([phone]) and ask to speak to a nurse about a positive COVID test.   Send a [employer] message to your care team. In [employer], select \"Ask a Medical Question\" then \"Do I need an appointment\" and put \"COVID\" in the subject line. Please include a phone number to call you back in the message.        Get lots of rest. Drink extra fluids (unless a doctor has told you not to)  Take Tylenol (acetaminophen) or ibuprofen for fever or pain. If you have liver or kidney problems, ask your family doctor if it's okay to take Tylenol or ibuprofen  Take over the counter medications for your symptoms, as directed by your doctor. You may also talk to your pharmacist.    If you have other health problems (like cancer, heart failure, an organ transplant or severe kidney disease): Call your specialty clinic if you don't feel better in the next 2 days.  Know when to call 911. Emergency warning signs include:  Trouble breathing or shortness of breath  Pain or pressure in the chest that doesn't go away  Feeling confused like you haven't felt before, or not being able to wake up  Bluish-colored lips or face    Where can I get more information?  [facility] - About COVID-19: [email or web address]   CDC - What to Do If You're Sick: https://www.cdc.gov/coronavirus/2019-ncov/if-you-are-sick/index.html   CDC - Isolation https://www.cdc.gov/coronavirus/2019-ncov/your-health/isolation.html    "

## 2024-03-14 ENCOUNTER — LAB (OUTPATIENT)
Dept: LAB | Facility: CLINIC | Age: 49
End: 2024-03-14
Payer: COMMERCIAL

## 2024-03-14 ENCOUNTER — MYC MEDICAL ADVICE (OUTPATIENT)
Dept: FAMILY MEDICINE | Facility: CLINIC | Age: 49
End: 2024-03-14

## 2024-03-14 ENCOUNTER — OFFICE VISIT (OUTPATIENT)
Dept: FAMILY MEDICINE | Facility: CLINIC | Age: 49
End: 2024-03-14
Payer: COMMERCIAL

## 2024-03-14 VITALS
RESPIRATION RATE: 16 BRPM | OXYGEN SATURATION: 99 % | SYSTOLIC BLOOD PRESSURE: 118 MMHG | HEIGHT: 66 IN | HEART RATE: 71 BPM | WEIGHT: 174.2 LBS | BODY MASS INDEX: 28 KG/M2 | TEMPERATURE: 97.8 F | DIASTOLIC BLOOD PRESSURE: 74 MMHG

## 2024-03-14 DIAGNOSIS — E11.9 DIABETES MELLITUS, TYPE 2 (H): ICD-10-CM

## 2024-03-14 DIAGNOSIS — I10 HYPERTENSION GOAL BP (BLOOD PRESSURE) < 140/90: ICD-10-CM

## 2024-03-14 DIAGNOSIS — E11.9 TYPE 2 DIABETES MELLITUS WITHOUT COMPLICATION, WITHOUT LONG-TERM CURRENT USE OF INSULIN (H): Primary | ICD-10-CM

## 2024-03-14 DIAGNOSIS — I10 HYPERTENSION GOAL BP (BLOOD PRESSURE) < 140/90: Primary | ICD-10-CM

## 2024-03-14 DIAGNOSIS — Z12.31 VISIT FOR SCREENING MAMMOGRAM: ICD-10-CM

## 2024-03-14 DIAGNOSIS — Z12.11 SCREEN FOR COLON CANCER: ICD-10-CM

## 2024-03-14 DIAGNOSIS — E11.9 TYPE 2 DIABETES MELLITUS WITHOUT COMPLICATION, WITHOUT LONG-TERM CURRENT USE OF INSULIN (H): ICD-10-CM

## 2024-03-14 LAB
ANION GAP SERPL CALCULATED.3IONS-SCNC: 9 MMOL/L (ref 7–15)
BUN SERPL-MCNC: 10.1 MG/DL (ref 6–20)
CALCIUM SERPL-MCNC: 8.9 MG/DL (ref 8.6–10)
CHLORIDE SERPL-SCNC: 100 MMOL/L (ref 98–107)
CHOLEST SERPL-MCNC: 148 MG/DL
CREAT SERPL-MCNC: 0.68 MG/DL (ref 0.51–0.95)
CREAT UR-MCNC: 170.6 MG/DL
DEPRECATED HCO3 PLAS-SCNC: 29 MMOL/L (ref 22–29)
EGFRCR SERPLBLD CKD-EPI 2021: >90 ML/MIN/1.73M2
FASTING STATUS PATIENT QL REPORTED: YES
GLUCOSE SERPL-MCNC: 219 MG/DL (ref 70–99)
HBA1C MFR BLD: 6.7 % (ref 0–5.6)
HDLC SERPL-MCNC: 45 MG/DL
LDLC SERPL CALC-MCNC: 53 MG/DL
MICROALBUMIN UR-MCNC: <12 MG/L
MICROALBUMIN/CREAT UR: NORMAL MG/G{CREAT}
NONHDLC SERPL-MCNC: 103 MG/DL
POTASSIUM SERPL-SCNC: 4 MMOL/L (ref 3.4–5.3)
SODIUM SERPL-SCNC: 138 MMOL/L (ref 135–145)
TRIGL SERPL-MCNC: 248 MG/DL

## 2024-03-14 PROCEDURE — 80048 BASIC METABOLIC PNL TOTAL CA: CPT

## 2024-03-14 PROCEDURE — 83036 HEMOGLOBIN GLYCOSYLATED A1C: CPT

## 2024-03-14 PROCEDURE — 36415 COLL VENOUS BLD VENIPUNCTURE: CPT

## 2024-03-14 PROCEDURE — 82570 ASSAY OF URINE CREATININE: CPT

## 2024-03-14 PROCEDURE — 82043 UR ALBUMIN QUANTITATIVE: CPT

## 2024-03-14 PROCEDURE — 80061 LIPID PANEL: CPT

## 2024-03-14 PROCEDURE — 99214 OFFICE O/P EST MOD 30 MIN: CPT | Performed by: NURSE PRACTITIONER

## 2024-03-14 RX ORDER — DULAGLUTIDE 3 MG/.5ML
INJECTION, SOLUTION SUBCUTANEOUS
Status: CANCELLED | OUTPATIENT
Start: 2024-03-14

## 2024-03-14 RX ORDER — ATENOLOL 50 MG/1
50 TABLET ORAL DAILY
Qty: 90 TABLET | Refills: 0 | Status: SHIPPED | OUTPATIENT
Start: 2024-03-14 | End: 2024-08-06

## 2024-03-14 RX ORDER — METFORMIN HCL 500 MG
1000 TABLET, EXTENDED RELEASE 24 HR ORAL 2 TIMES DAILY WITH MEALS
Qty: 360 TABLET | Refills: 0 | Status: SHIPPED | OUTPATIENT
Start: 2024-03-14 | End: 2024-10-03

## 2024-03-14 RX ORDER — ATORVASTATIN CALCIUM 20 MG/1
20 TABLET, FILM COATED ORAL DAILY
Qty: 90 TABLET | Refills: 0 | Status: SHIPPED | OUTPATIENT
Start: 2024-03-14 | End: 2024-08-06

## 2024-03-14 ASSESSMENT — PAIN SCALES - GENERAL: PAINLEVEL: NO PAIN (0)

## 2024-03-14 NOTE — PROGRESS NOTES
"  Assessment & Plan     Hypertension goal BP (blood pressure) < 140/90  Well controlled. Continue on medications as prescribed.   - BASIC METABOLIC PANEL; Future  - atenolol (TENORMIN) 50 MG tablet; Take 1 tablet (50 mg) by mouth daily    Type 2 diabetes mellitus without complication, without long-term current use of insulin (H)  Well controlled. Has not been able to take trulicity for the month due to supply shortage. Hemoglobin a1c is 6.7%  - Lipid panel reflex to direct LDL Fasting; Future  - Albumin Random Urine Quantitative with Creat Ratio; Future  - HEMOGLOBIN A1C; Future  - atorvastatin (LIPITOR) 20 MG tablet; Take 1 tablet (20 mg) by mouth daily  - metFORMIN (GLUCOPHAGE XR) 500 MG 24 hr tablet; Take 2 tablets (1,000 mg) by mouth 2 times daily (with meals)    Screen for colon cancer  - Colonoscopy Screening  Referral; Future    Visit for screening mammogram  - MA SCREENING DIGITAL BILAT - Future  (s+30); Future        BMI  Estimated body mass index is 28.12 kg/m  as calculated from the following:    Height as of this encounter: 1.676 m (5' 6\").    Weight as of this encounter: 79 kg (174 lb 3.2 oz).             Keegan Rojo is a 48 year old, presenting for the following health issues:  Diabetes        3/14/2024     7:24 AM   Additional Questions   Roomed by Rere COULTER MA   Accompanied by Self     History of Present Illness       Diabetes:   She presents for follow up of diabetes.  She is checking home blood glucose a few times a month.   She checks blood glucose at bedtime.  Blood glucose is never over 200 and never under 70. She is aware of hypoglycemia symptoms including none.    She has no concerns regarding her diabetes at this time.   She is not experiencing numbness or burning in feet, excessive thirst, blurry vision, weight changes or redness, sores or blisters on feet.           She eats 2-3 servings of fruits and vegetables daily.She consumes 1 sweetened beverage(s) daily.She exercises " "with enough effort to increase her heart rate 20 to 29 minutes per day.    She is taking medications regularly.     Hyperlipidemia Follow-Up  Are you regularly taking any medication or supplement to lower your cholesterol?   Yes- Atorvastatin  Are you having muscle aches or other side effects that you think could be caused by your cholesterol lowering medication?  No    Hypertension Follow-up  Do you check your blood pressure regularly outside of the clinic? No   Are you following a low salt diet? Yes  Are your blood pressures ever more than 140 on the top number (systolic) OR more   than 90 on the bottom number (diastolic), for example 140/90? No        Review of Systems  Constitutional, HEENT, cardiovascular, pulmonary, gi and gu systems are negative, except as otherwise noted.      Objective    /74   Pulse 71   Temp 97.8  F (36.6  C) (Tympanic)   Resp 16   Ht 1.676 m (5' 6\")   Wt 79 kg (174 lb 3.2 oz)   LMP 12/10/2014   SpO2 99%   BMI 28.12 kg/m    Body mass index is 28.12 kg/m .  Physical Exam   GENERAL: alert and no distress  NECK: no adenopathy, no asymmetry, masses, or scars  RESP: lungs clear to auscultation - no rales, rhonchi or wheezes  CV: regular rate and rhythm, normal S1 S2, no S3 or S4, no murmur, click or rub, no peripheral edema  ABDOMEN: soft, nontender, no hepatosplenomegaly, no masses and bowel sounds normal  MS: no gross musculoskeletal defects noted, no edema  NEURO: Normal strength and tone, mentation intact and speech normal  PSYCH: mentation appears normal, affect normal/bright  Diabetic foot exam: normal DP and PT pulses, no trophic changes or ulcerative lesions, normal sensory exam, and normal monofilament exam            Signed Electronically by: RIO Alex CNP    "

## 2024-03-14 NOTE — TELEPHONE ENCOUNTER
Patient requesting Trulicity refill  Currently 0.75 and 3 mg are available    Patient is currently on 1.5 mg for diabetes    Pended 3 mg.     Monse Carrillo RN on 3/14/2024 at 3:07 PM

## 2024-06-23 ENCOUNTER — PATIENT OUTREACH (OUTPATIENT)
Dept: CARE COORDINATION | Facility: CLINIC | Age: 49
End: 2024-06-23
Payer: COMMERCIAL

## 2024-08-05 DIAGNOSIS — E11.9 TYPE 2 DIABETES MELLITUS WITHOUT COMPLICATION, WITHOUT LONG-TERM CURRENT USE OF INSULIN (H): ICD-10-CM

## 2024-08-05 DIAGNOSIS — I10 HYPERTENSION GOAL BP (BLOOD PRESSURE) < 140/90: ICD-10-CM

## 2024-08-06 RX ORDER — DULAGLUTIDE 1.5 MG/.5ML
1.5 INJECTION, SOLUTION SUBCUTANEOUS
Qty: 12 ML | Refills: 2 | Status: SHIPPED | OUTPATIENT
Start: 2024-08-06

## 2024-08-06 RX ORDER — ATORVASTATIN CALCIUM 20 MG/1
20 TABLET, FILM COATED ORAL DAILY
Qty: 90 TABLET | Refills: 2 | Status: SHIPPED | OUTPATIENT
Start: 2024-08-06

## 2024-08-06 RX ORDER — ATENOLOL 50 MG/1
50 TABLET ORAL DAILY
Qty: 90 TABLET | Refills: 2 | Status: SHIPPED | OUTPATIENT
Start: 2024-08-06

## 2024-10-03 ENCOUNTER — OFFICE VISIT (OUTPATIENT)
Dept: FAMILY MEDICINE | Facility: CLINIC | Age: 49
End: 2024-10-03
Payer: COMMERCIAL

## 2024-10-03 VITALS
OXYGEN SATURATION: 99 % | WEIGHT: 176.4 LBS | DIASTOLIC BLOOD PRESSURE: 74 MMHG | BODY MASS INDEX: 28.35 KG/M2 | RESPIRATION RATE: 16 BRPM | HEIGHT: 66 IN | SYSTOLIC BLOOD PRESSURE: 126 MMHG | HEART RATE: 78 BPM | TEMPERATURE: 98.1 F

## 2024-10-03 DIAGNOSIS — E78.1 HYPERTRIGLYCERIDEMIA: ICD-10-CM

## 2024-10-03 DIAGNOSIS — E11.9 TYPE 2 DIABETES MELLITUS WITHOUT COMPLICATION, WITHOUT LONG-TERM CURRENT USE OF INSULIN (H): Primary | ICD-10-CM

## 2024-10-03 LAB
EST. AVERAGE GLUCOSE BLD GHB EST-MCNC: 134 MG/DL
HBA1C MFR BLD: 6.3 % (ref 0–5.6)

## 2024-10-03 PROCEDURE — 99214 OFFICE O/P EST MOD 30 MIN: CPT | Mod: 25 | Performed by: NURSE PRACTITIONER

## 2024-10-03 PROCEDURE — 83036 HEMOGLOBIN GLYCOSYLATED A1C: CPT | Performed by: NURSE PRACTITIONER

## 2024-10-03 PROCEDURE — 90471 IMMUNIZATION ADMIN: CPT | Performed by: NURSE PRACTITIONER

## 2024-10-03 PROCEDURE — 91320 SARSCV2 VAC 30MCG TRS-SUC IM: CPT | Performed by: NURSE PRACTITIONER

## 2024-10-03 PROCEDURE — 90656 IIV3 VACC NO PRSV 0.5 ML IM: CPT | Performed by: NURSE PRACTITIONER

## 2024-10-03 PROCEDURE — 36415 COLL VENOUS BLD VENIPUNCTURE: CPT | Performed by: NURSE PRACTITIONER

## 2024-10-03 PROCEDURE — 90480 ADMN SARSCOV2 VAC 1/ONLY CMP: CPT | Performed by: NURSE PRACTITIONER

## 2024-10-03 RX ORDER — METFORMIN HYDROCHLORIDE 500 MG/1
1000 TABLET, EXTENDED RELEASE ORAL 2 TIMES DAILY WITH MEALS
Qty: 360 TABLET | Refills: 1 | Status: SHIPPED | OUTPATIENT
Start: 2024-10-03

## 2024-10-03 ASSESSMENT — PAIN SCALES - GENERAL: PAINLEVEL: NO PAIN (0)

## 2024-10-03 NOTE — PROGRESS NOTES
"  Assessment & Plan     Type 2 diabetes mellitus without complication, without long-term current use of insulin (H)  Well controlled. Doing well on prescribed medications. She is stable. Hemoglobin a1c is 6.3%. refill provided of metformin. She will be due for preventative care and medication check/ refill in 6 months.   - HEMOGLOBIN A1C; Future  - HEMOGLOBIN A1C  - metFORMIN (GLUCOPHAGE XR) 500 MG 24 hr tablet; Take 2 tablets (1,000 mg) by mouth 2 times daily (with meals).    Hypertriglyceridemia  Continue with healthy lifestyle and modifications. No medication changes today. Will recheck labs in 6 months.       BMI  Estimated body mass index is 28.47 kg/m  as calculated from the following:    Height as of this encounter: 1.676 m (5' 6\").    Weight as of this encounter: 80 kg (176 lb 6.4 oz).         Keegan Rojo is a 49 year old, presenting for the following health issues:  Chronic Disease Management        10/3/2024     7:09 AM   Additional Questions   Roomed by Rere COULTER MA     History of Present Illness       Diabetes:   She presents for follow up of diabetes.  She is checking home blood glucose a few times a month.   She checks blood glucose before and after meals and at bedtime.  Blood glucose is never over 200 and never under 70.  When her blood glucose is low, the patient is asymptomatic for confusion, blurred vision, lethargy and reports not feeling dizzy, shaky, or weak.   She has no concerns regarding her diabetes at this time.   She is not experiencing numbness or burning in feet, excessive thirst, blurry vision, weight changes or redness, sores or blisters on feet.           Hypertension: She presents for follow up of hypertension.  She does not check blood pressure  regularly outside of the clinic. Outpatient blood pressures have not been over 140/90. She follows a low salt diet.     She eats 2-3 servings of fruits and vegetables daily.She consumes 1 sweetened beverage(s) daily.She exercises with " "enough effort to increase her heart rate 10 to 19 minutes per day.  She exercises with enough effort to increase her heart rate 4 days per week.   She is taking medications regularly.       Hyperlipidemia Follow-Up  Are you regularly taking any medication or supplement to lower your cholesterol?   Yes- Atorvastatin  Are you having muscle aches or other side effects that you think could be caused by your cholesterol lowering medication?  No    Dealing with stress related to daughters recent medical stuff.       Review of Systems  Constitutional, HEENT, cardiovascular, pulmonary, gi and gu systems are negative, except as otherwise noted.      Objective    /74   Pulse 78   Temp 98.1  F (36.7  C) (Tympanic)   Resp 16   Ht 1.676 m (5' 6\")   Wt 80 kg (176 lb 6.4 oz)   LMP 12/10/2014   SpO2 99%   BMI 28.47 kg/m    Body mass index is 28.47 kg/m .    Physical Exam   GENERAL: alert and no distress  NECK: no adenopathy, no asymmetry, masses, or scars  RESP: lungs clear to auscultation - no rales, rhonchi or wheezes  CV: regular rate and rhythm, normal S1 S2, no S3 or S4, no murmur, click or rub, no peripheral edema  ABDOMEN: soft, nontender, no hepatosplenomegaly, no masses and bowel sounds normal  MS: no gross musculoskeletal defects noted, no edema  PSYCH: mentation appears normal, affect normal/bright          Signed Electronically by: RIO Alex CNP    "

## 2025-02-01 ENCOUNTER — HEALTH MAINTENANCE LETTER (OUTPATIENT)
Age: 50
End: 2025-02-01

## 2025-04-19 ENCOUNTER — HEALTH MAINTENANCE LETTER (OUTPATIENT)
Age: 50
End: 2025-04-19

## 2025-04-23 DIAGNOSIS — I10 HYPERTENSION GOAL BP (BLOOD PRESSURE) < 140/90: ICD-10-CM

## 2025-04-23 RX ORDER — ATENOLOL 50 MG/1
50 TABLET ORAL DAILY
Qty: 90 TABLET | Refills: 1 | Status: SHIPPED | OUTPATIENT
Start: 2025-04-23

## 2025-05-08 NOTE — OR NURSING
Anesthesia Pre Eval Note    Anesthesia ROS/Med Hx        Anesthetic Complication History:    Patient does not have a history of anesthetic complications      Pulmonary Review:  Patient does not have a pulmonary history      Neuro/Psych Review:       Positive for CVA    Cardiovascular Review:   Exercise tolerance: poor (<4 METS)  Positive for hypertension  Positive for hyperlipidemia    GI/HEPATIC/RENAL Review:     Positive for GERD Positive for liver disease     End/Other Review:    Positive for chronic pain  Additional Results:      ALLERGIES:   -- Desoximetasone -- Other (See Comments)    --  Blisters   -- Sulfa Antibiotics -- Nausea & Vomiting   -- Triamcinolone Acetonide -- Other (See Comments)    --  Burns & itches.   Last Labs        Component                Value               Date/Time                  WBC                      5.9                 08/06/2024 0858            RBC                      4.05                08/06/2024 0858            HGB                      12.4                08/06/2024 0858            HCT                      37.0                08/06/2024 0858            MCV                      91.4                08/06/2024 0858            MCH                      30.6                08/06/2024 0858            MCHC                     33.5                08/06/2024 0858            RDW-CV                   12.2                08/06/2024 0858            Sodium                   142                 08/06/2024 0858            Potassium                3.9                 08/06/2024 0858            Chloride                 104                 08/06/2024 0858            Carbon Dioxide           30                  08/06/2024 0858            Glucose                  90                  08/06/2024 0858            BUN                      10                  08/06/2024 0858            Creatinine               1.05 (H)            08/06/2024 0858            Glomerular Filtrati*     57 (L)               Report rec'd from ANDREA Heath RN   08/06/2024 0858            Calcium                  8.8                 08/06/2024 0858            PLT                      211                 08/06/2024 0858        Past Medical History:  No date: Benign essential HTN  12/01/2015: BPPV (benign paroxysmal positional vertigo)  No date: Conjunctival hemorrhage of left eye  No date: Dyslipidemia  No date: Fibrocystic breast disease in female, right  01/22/2019: GERD (gastroesophageal reflux disease)  No date: High cholesterol  No date: SVT (supraventricular tachycardia) (CMD)      Comment:  -likely AVNRT, 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 She underwent EP               study 8/23/13 with empiric slow pathway ablation.-  Saw                Alfonso Kim  And Dr. Alejandre  . 9-15-11- echo -normal,                - last stress test  4-25-11-nml. -5.8.2013  Carotid  Neg                and tilt table neg .48 hour Holter monitor 9/15 to                9/17/14.- NSR -30  Day event done 10.2014-NSR with                occasional PAC-cont metoprolol-Dr. Ramos- doing good                with metoprolol  No date: Tinnitus aurium, left  No date: Vitamin D deficiency  Past Surgical History:  09/16/2013: Cardiac electrophysiology mapping and ablation      Comment:  s/p empiric slow pathway ablation  No date: Colonoscopy  1992: Tubal ligation  No date: Upper gastrointestinal endoscopy   Prior to Admission medications :  Medication hydrALAZINE (APRESOLINE) 25 MG tablet, Sig Take 0.5 tablets by mouth in the morning and 0.5 tablets in the evening., Start Date 1/3/25, End Date , Taking? Yes, Authorizing Provider Yovani Rangel MD    Medication carvedilol (COREG) 12.5 MG tablet, Sig Take 1 tablet by mouth in the morning and 1 tablet in the evening. Take with meals., Start Date 12/20/24, End Date , Taking? Yes, Authorizing Provider Danita Stern MD    Medication aspirin (ECOTRIN) 81 MG EC tablet, Sig Take 1 tablet by mouth daily., Start Date 12/10/24, End Date , Taking? Yes, Authorizing  Provider Danita Stern MD    Medication losartan (COZAAR) 50 MG tablet, Sig Take 1 tablet by mouth in the morning and 1 tablet in the evening., Start Date 8/14/24, End Date , Taking? Yes, Authorizing Provider Danita Stern MD    Medication Sutab 7144-260-705 MG Tab, Sig Take 12 tablets by mouth as directed. See Colonoscopy Instructions.  Patient not taking: Reported on 3/24/2025, Start Date 3/4/25, End Date , Taking? , Authorizing Provider Abraham García MD    Medication sucralfate (Carafate) 1 g tablet, Sig Take 1 tablet by mouth 4 times daily., Start Date 1/14/25, End Date , Taking? , Authorizing Provider Taylor De Leon CNP    Medication pantoprazole (PROTONIX) 40 MG tablet, Sig Take 1 tablet by mouth twice daily, Start Date 11/2/24, End Date , Taking? , Authorizing Provider Noman Lwo MD    Medication atorvastatin (LIPITOR) 40 MG tablet, Sig Take 1 tablet by mouth nightly., Start Date 8/7/24, End Date , Taking? , Authorizing Provider Noman Low MD    Medication betamethasone dipropionate (DIPROSONE) 0.05 % cream, Sig Apply topically 2 times daily. Apply to hands for hand eczema., Start Date 3/12/24, End Date , Taking? , Authorizing Provider Noman Low MD    Medication psyllium (METAMUCIL [employer]) 58.12 % packet for oral suspension, Sig , Start Date , End Date , Taking? , Authorizing Provider Provider, Outside    Medication Cyanocobalamin (VITAMIN B 12) 250 MCG Lozenge, Sig Take 1 tablet by mouth daily. , Start Date , End Date , Taking? , Authorizing Provider Provider, Outside    Medication Probiotic Product (PROBIOTIC DAILY PO), Sig Take 1 tablet by mouth daily., Start Date , End Date , Taking? , Authorizing Provider Provider, Outside    Medication VITAMIN D, CHOLECALCIFEROL, PO, Sig Take by mouth daily., Start Date , End Date , Taking? , Authorizing Provider Provider, Outside         Patient Vitals for the past 24 hrs:   BP Temp Temp src Pulse Resp SpO2    25 1000 (!) 143/91 35.6 °C (96.1 °F) Temporal 66 20 98 %       Social history reviewed:  Social History     Tobacco Use   Smoking Status Former    Current packs/day: 0.00    Average packs/day: 1.5 packs/day for 39.0 years (58.5 ttl pk-yrs)    Types: Cigarettes    Start date: 1968    Quit date: 2007    Years since quittin.3   Smokeless Tobacco Never        E-Cigarette/Vaping Substances & Devices    E-Cigarette/Vaping Use Never Used     Nicotine No     THC No     CBD No     Flavoring No     Disposable No     Pre-filled or Refillable Cartridge No     Refillable Tank No     Pre-filled Pod No        Social History     Substance and Sexual Activity   Alcohol Use Not Currently    Comment: 1-2 a year            Relevant Problems   No relevant active problems       Physical Exam     Airway   Mallampati: II  TM Distance: >3 FB  Neck ROM: Full  Neck: Non-tender and Able to place in sniff position  TMJ Mobility: Good    Cardiovascular  Cardiovascular exam normal  Cardio Rhythm: Regular  Cardio Rate: Normal    Head Assessment  Head assessment: Normocephalic and Atraumatic    General Assessment  General Assessment: Alert and oriented and No acute distress    Dental Exam  Dental exam normal    Pulmonary Exam  Pulmonary exam normal  Breath sounds clear to auscultation:  Yes    Abdominal Exam  Abdominal exam normal      Anesthesia Plan:    ASA Status: 3  Anesthesia Type: MAC    Induction: Intravenous  Maintenance: TIVA  Premedication: None      Postoperative analgesia plan does NOT include opiods    Checklist  Reviewed: NPO Status, Allergies, Medications, Problem list, Past Med History and Patient Summary  Consent/Risks Discussed Statement:  The proposed anesthetic plan, including its risks and benefits, have been discussed with the Patient along with the risks and benefits of alternatives. Questions were encouraged and answered and the patient and/or representative understands and agrees to proceed.    I have  discussed elements of the patient's history or examination, as noted above and/or as follows, that place the patient at higher risk of complications; age.    I discussed with the patient (and/or patient's legal representative) the risks and benefits of the proposed anesthesia plan, MAC, which may include services performed by other anesthesia providers.    Alternative anesthesia plans, if available, were reviewed with the patient (and/or patient's legal representative). Discussion has been held with the patient (and/or patient's legal representative) regarding risks of anesthesia, which include Intra-operative Awareness and emergent situations that may require change in anesthesia plan.    The patient (and/or patient's legal representative) has indicated understanding, his/her questions have been answered, and he/she wishes to proceed with the planned anesthetic.    Blood Products: Not Anticipated

## 2025-07-21 DIAGNOSIS — E11.9 TYPE 2 DIABETES MELLITUS WITHOUT COMPLICATION, WITHOUT LONG-TERM CURRENT USE OF INSULIN (H): ICD-10-CM

## 2025-07-21 RX ORDER — ATORVASTATIN CALCIUM 20 MG/1
20 TABLET, FILM COATED ORAL DAILY
Qty: 90 TABLET | Refills: 0 | Status: SHIPPED | OUTPATIENT
Start: 2025-07-21

## 2025-08-28 DIAGNOSIS — E11.9 TYPE 2 DIABETES MELLITUS WITHOUT COMPLICATION, WITHOUT LONG-TERM CURRENT USE OF INSULIN (H): ICD-10-CM

## 2025-08-28 RX ORDER — DULAGLUTIDE 1.5 MG/.5ML
1.5 INJECTION, SOLUTION SUBCUTANEOUS
Qty: 2 ML | Refills: 0 | Status: SHIPPED | OUTPATIENT
Start: 2025-08-28

## (undated) DEVICE — PACK CYSTOSCOPY SBA15CYFSI

## (undated) DEVICE — GOWN XLG DISP 9545

## (undated) DEVICE — GLOVE PROTEXIS W/NEU-THERA 6.5  2D73TE65

## (undated) DEVICE — KIT PROCEDURE PINPOINT PP9036

## (undated) DEVICE — SUCTION IRRIGATION STRYKFLOW II W/TIP DISP 250-070-520

## (undated) DEVICE — DRAPE GYN/UROLOGY FLUID POUCH TUR 29455

## (undated) DEVICE — ENDO TROCAR BLUNT TIP KII BALLOON 12X100MM C0R47

## (undated) DEVICE — SHEATH URETERAL ACCESS NAVIGATOR HD 11/13FRX36CM M0062502220

## (undated) DEVICE — RAD RX ISOVUE 300 (50ML) 61% IOPAMIDOL CHARGE PER ML

## (undated) DEVICE — CATH URETERAL OPEN END 6FR AXXCESS

## (undated) DEVICE — GUIDEWIRE SENSOR DUAL FLEX STR 0.035"X150CM M0066703080

## (undated) DEVICE — ESU ENDO SCISSORS 5MM CVD 5DCS

## (undated) DEVICE — ENDO TROCAR SLEEVE KII ADV FIXATION 05X100MM CFS02

## (undated) DEVICE — SU VICRYL 4-0 FS-2 27" J422-H

## (undated) DEVICE — ESU PENCIL W/COATED BLADE E2450H

## (undated) DEVICE — CLIP APPLIER ENDO 5MM M/L LIGAMAX EL5ML

## (undated) DEVICE — ENDO TROCAR FIRST ENTRY KII FIOS ADV FIX 05X100MM CFF03

## (undated) DEVICE — ADHESIVE SWIFTSET 0.8ML OCTYL SS6

## (undated) DEVICE — PAD CHUX UNDERPAD 23X24" 7136

## (undated) DEVICE — ESU CORD MONOPOLAR 10'  E0510

## (undated) DEVICE — Device

## (undated) DEVICE — SOL NACL 0.9% IRRIG 1000ML BOTTLE 07138-09

## (undated) DEVICE — GLOVE PROTEXIS W/NEU-THERA 8.0  2D73TE80

## (undated) DEVICE — ENDO POUCH UNIV RETRIEVAL SYSTEM INZII 10MM CD001

## (undated) DEVICE — CATH URETERAL DUAL LUMEN 10FRX54CM M0064051000

## (undated) DEVICE — DECANTER VIAL 2006S

## (undated) DEVICE — ESU HOLSTER PLASTIC DISP E2400

## (undated) DEVICE — DRAPE POUCH INSTRUMENT 3 POCKET 1018L

## (undated) DEVICE — SU VICRYL 0 UR-6 27" J603H

## (undated) DEVICE — TUBING SUCTION 12"X1/4" N612

## (undated) DEVICE — SOL WATER IRRIG 1000ML BOTTLE 2F7114

## (undated) DEVICE — SOL WATER IRRIG 1000ML BOTTLE 07139-09

## (undated) DEVICE — LASER FIBER HOLMIUM FLEXIVA 200UM M0068403910 840-391

## (undated) DEVICE — SOL NACL 0.9% IRRIG 3000ML BAG 2B7477

## (undated) DEVICE — PREP CHLORAPREP 26ML TINTED ORANGE  260815

## (undated) RX ORDER — KETOROLAC TROMETHAMINE 30 MG/ML
INJECTION, SOLUTION INTRAMUSCULAR; INTRAVENOUS
Status: DISPENSED
Start: 2018-11-15

## (undated) RX ORDER — SCOLOPAMINE TRANSDERMAL SYSTEM 1 MG/1
PATCH, EXTENDED RELEASE TRANSDERMAL
Status: DISPENSED
Start: 2018-11-15

## (undated) RX ORDER — KETOROLAC TROMETHAMINE 30 MG/ML
INJECTION, SOLUTION INTRAMUSCULAR; INTRAVENOUS
Status: DISPENSED
Start: 2018-09-12

## (undated) RX ORDER — FENTANYL CITRATE 50 UG/ML
INJECTION, SOLUTION INTRAMUSCULAR; INTRAVENOUS
Status: DISPENSED
Start: 2018-09-12

## (undated) RX ORDER — LIDOCAINE HYDROCHLORIDE 20 MG/ML
INJECTION, SOLUTION EPIDURAL; INFILTRATION; INTRACAUDAL; PERINEURAL
Status: DISPENSED
Start: 2018-11-15

## (undated) RX ORDER — DEXAMETHASONE SODIUM PHOSPHATE 4 MG/ML
INJECTION, SOLUTION INTRA-ARTICULAR; INTRALESIONAL; INTRAMUSCULAR; INTRAVENOUS; SOFT TISSUE
Status: DISPENSED
Start: 2018-11-15

## (undated) RX ORDER — BUPIVACAINE HYDROCHLORIDE AND EPINEPHRINE 5; 5 MG/ML; UG/ML
INJECTION, SOLUTION EPIDURAL; INTRACAUDAL; PERINEURAL
Status: DISPENSED
Start: 2018-09-12

## (undated) RX ORDER — HYDROMORPHONE HYDROCHLORIDE 1 MG/ML
INJECTION, SOLUTION INTRAMUSCULAR; INTRAVENOUS; SUBCUTANEOUS
Status: DISPENSED
Start: 2018-11-15

## (undated) RX ORDER — FENTANYL CITRATE 50 UG/ML
INJECTION, SOLUTION INTRAMUSCULAR; INTRAVENOUS
Status: DISPENSED
Start: 2018-11-15

## (undated) RX ORDER — HYDROCODONE BITARTRATE AND ACETAMINOPHEN 5; 325 MG/1; MG/1
TABLET ORAL
Status: DISPENSED
Start: 2018-09-12

## (undated) RX ORDER — ACETAMINOPHEN 500 MG
TABLET ORAL
Status: DISPENSED
Start: 2018-11-15

## (undated) RX ORDER — PROPOFOL 10 MG/ML
INJECTION, EMULSION INTRAVENOUS
Status: DISPENSED
Start: 2018-11-15

## (undated) RX ORDER — DEXAMETHASONE SODIUM PHOSPHATE 4 MG/ML
INJECTION, SOLUTION INTRA-ARTICULAR; INTRALESIONAL; INTRAMUSCULAR; INTRAVENOUS; SOFT TISSUE
Status: DISPENSED
Start: 2018-09-12

## (undated) RX ORDER — OXYCODONE HYDROCHLORIDE 5 MG/1
TABLET ORAL
Status: DISPENSED
Start: 2018-11-15

## (undated) RX ORDER — NEOSTIGMINE METHYLSULFATE 1 MG/ML
VIAL (ML) INJECTION
Status: DISPENSED
Start: 2018-09-12

## (undated) RX ORDER — ONDANSETRON 2 MG/ML
INJECTION INTRAMUSCULAR; INTRAVENOUS
Status: DISPENSED
Start: 2018-09-12

## (undated) RX ORDER — SCOLOPAMINE TRANSDERMAL SYSTEM 1 MG/1
PATCH, EXTENDED RELEASE TRANSDERMAL
Status: DISPENSED
Start: 2018-09-12

## (undated) RX ORDER — ONDANSETRON 2 MG/ML
INJECTION INTRAMUSCULAR; INTRAVENOUS
Status: DISPENSED
Start: 2018-11-15

## (undated) RX ORDER — PROPOFOL 10 MG/ML
INJECTION, EMULSION INTRAVENOUS
Status: DISPENSED
Start: 2018-09-12

## (undated) RX ORDER — CEFAZOLIN SODIUM 2 G/100ML
INJECTION, SOLUTION INTRAVENOUS
Status: DISPENSED
Start: 2018-11-15

## (undated) RX ORDER — GLYCOPYRROLATE 0.2 MG/ML
INJECTION, SOLUTION INTRAMUSCULAR; INTRAVENOUS
Status: DISPENSED
Start: 2018-09-12

## (undated) RX ORDER — LIDOCAINE HYDROCHLORIDE 10 MG/ML
INJECTION, SOLUTION EPIDURAL; INFILTRATION; INTRACAUDAL; PERINEURAL
Status: DISPENSED
Start: 2018-09-12